# Patient Record
Sex: MALE | Race: WHITE | NOT HISPANIC OR LATINO | Employment: FULL TIME | URBAN - METROPOLITAN AREA
[De-identification: names, ages, dates, MRNs, and addresses within clinical notes are randomized per-mention and may not be internally consistent; named-entity substitution may affect disease eponyms.]

---

## 2018-09-06 ENCOUNTER — HOSPITAL ENCOUNTER (OUTPATIENT)
Dept: RADIOLOGY | Facility: MEDICAL CENTER | Age: 53
End: 2018-09-06

## 2018-09-06 ENCOUNTER — APPOINTMENT (OUTPATIENT)
Dept: RADIOLOGY | Facility: MEDICAL CENTER | Age: 53
DRG: 023 | End: 2018-09-06
Attending: EMERGENCY MEDICINE
Payer: COMMERCIAL

## 2018-09-06 ENCOUNTER — HOSPITAL ENCOUNTER (INPATIENT)
Facility: MEDICAL CENTER | Age: 53
LOS: 8 days | DRG: 023 | End: 2018-09-14
Attending: EMERGENCY MEDICINE | Admitting: HOSPITALIST
Payer: COMMERCIAL

## 2018-09-06 ENCOUNTER — APPOINTMENT (OUTPATIENT)
Dept: RADIOLOGY | Facility: MEDICAL CENTER | Age: 53
DRG: 023 | End: 2018-09-06
Attending: RADIOLOGY
Payer: COMMERCIAL

## 2018-09-06 DIAGNOSIS — I63.9 CEREBROVASCULAR ACCIDENT (CVA), UNSPECIFIED MECHANISM (HCC): ICD-10-CM

## 2018-09-06 DIAGNOSIS — J96.00 ACUTE RESPIRATORY FAILURE, UNSPECIFIED WHETHER WITH HYPOXIA OR HYPERCAPNIA (HCC): ICD-10-CM

## 2018-09-06 PROBLEM — M79.89 SWELLING OF LOWER EXTREMITY: Status: ACTIVE | Noted: 2018-09-06

## 2018-09-06 PROBLEM — J96.01 ACUTE RESPIRATORY FAILURE WITH HYPOXIA (HCC): Status: ACTIVE | Noted: 2018-09-06

## 2018-09-06 PROBLEM — G93.6 CEREBRAL EDEMA (HCC): Status: ACTIVE | Noted: 2018-09-06

## 2018-09-06 LAB
ABO GROUP BLD: NORMAL
ACTION RANGE TRIGGERED IACRT: YES
ALBUMIN SERPL BCP-MCNC: 3.7 G/DL (ref 3.2–4.9)
ALBUMIN/GLOB SERPL: 1.5 G/DL
ALP SERPL-CCNC: 61 U/L (ref 30–99)
ALT SERPL-CCNC: 19 U/L (ref 2–50)
ANION GAP SERPL CALC-SCNC: 7 MMOL/L (ref 0–11.9)
APPEARANCE UR: CLEAR
APTT PPP: 27.6 SEC (ref 24.7–36)
AST SERPL-CCNC: 24 U/L (ref 12–45)
BACTERIA #/AREA URNS HPF: NEGATIVE /HPF
BASE EXCESS BLDA CALC-SCNC: -3 MMOL/L (ref -4–3)
BASOPHILS # BLD AUTO: 0.6 % (ref 0–1.8)
BASOPHILS # BLD: 0.06 K/UL (ref 0–0.12)
BILIRUB SERPL-MCNC: 0.5 MG/DL (ref 0.1–1.5)
BILIRUB UR QL STRIP.AUTO: NEGATIVE
BLD GP AB SCN SERPL QL: NORMAL
BODY TEMPERATURE: ABNORMAL DEGREES
BUN SERPL-MCNC: 19 MG/DL (ref 8–22)
CALCIUM SERPL-MCNC: 8.8 MG/DL (ref 8.5–10.5)
CHLORIDE SERPL-SCNC: 107 MMOL/L (ref 96–112)
CO2 BLDA-SCNC: 25 MMOL/L (ref 20–33)
CO2 SERPL-SCNC: 21 MMOL/L (ref 20–33)
COLOR UR: YELLOW
CREAT SERPL-MCNC: 0.84 MG/DL (ref 0.5–1.4)
EKG IMPRESSION: NORMAL
EOSINOPHIL # BLD AUTO: 0.24 K/UL (ref 0–0.51)
EOSINOPHIL NFR BLD: 2.5 % (ref 0–6.9)
EPI CELLS #/AREA URNS HPF: NEGATIVE /HPF
ERYTHROCYTE [DISTWIDTH] IN BLOOD BY AUTOMATED COUNT: 44.2 FL (ref 35.9–50)
GLOBULIN SER CALC-MCNC: 2.5 G/DL (ref 1.9–3.5)
GLUCOSE BLD-MCNC: 90 MG/DL (ref 65–99)
GLUCOSE SERPL-MCNC: 113 MG/DL (ref 65–99)
GLUCOSE UR STRIP.AUTO-MCNC: NEGATIVE MG/DL
HCO3 BLDA-SCNC: 23.7 MMOL/L (ref 17–25)
HCT VFR BLD AUTO: 44.4 % (ref 42–52)
HGB BLD-MCNC: 15.2 G/DL (ref 14–18)
HYALINE CASTS #/AREA URNS LPF: ABNORMAL /LPF
IMM GRANULOCYTES # BLD AUTO: 0.06 K/UL (ref 0–0.11)
IMM GRANULOCYTES NFR BLD AUTO: 0.6 % (ref 0–0.9)
INR PPP: 1.02 (ref 0.87–1.13)
INST. QUALIFIED PATIENT IIQPT: YES
KETONES UR STRIP.AUTO-MCNC: NEGATIVE MG/DL
LACTATE BLD-SCNC: 1.7 MMOL/L (ref 0.5–2)
LEUKOCYTE ESTERASE UR QL STRIP.AUTO: NEGATIVE
LYMPHOCYTES # BLD AUTO: 0.6 K/UL (ref 1–4.8)
LYMPHOCYTES NFR BLD: 6.2 % (ref 22–41)
MCH RBC QN AUTO: 29.9 PG (ref 27–33)
MCHC RBC AUTO-ENTMCNC: 34.2 G/DL (ref 33.7–35.3)
MCV RBC AUTO: 87.4 FL (ref 81.4–97.8)
MICRO URNS: ABNORMAL
MONOCYTES # BLD AUTO: 0.69 K/UL (ref 0–0.85)
MONOCYTES NFR BLD AUTO: 7.1 % (ref 0–13.4)
NEUTROPHILS # BLD AUTO: 8.09 K/UL (ref 1.82–7.42)
NEUTROPHILS NFR BLD: 83 % (ref 44–72)
NITRITE UR QL STRIP.AUTO: NEGATIVE
NRBC # BLD AUTO: 0 K/UL
NRBC BLD-RTO: 0 /100 WBC
O2/TOTAL GAS SETTING VFR VENT: 100 %
PCO2 BLDA: 48.2 MMHG (ref 26–37)
PCO2 TEMP ADJ BLDA: 45.6 MMHG (ref 26–37)
PH BLDA: 7.3 [PH] (ref 7.4–7.5)
PH TEMP ADJ BLDA: 7.32 [PH] (ref 7.4–7.5)
PH UR STRIP.AUTO: 6 [PH]
PLATELET # BLD AUTO: 287 K/UL (ref 164–446)
PMV BLD AUTO: 9.5 FL (ref 9–12.9)
PO2 BLDA: 217 MMHG (ref 64–87)
PO2 TEMP ADJ BLDA: 211 MMHG (ref 64–87)
POTASSIUM SERPL-SCNC: 4.5 MMOL/L (ref 3.6–5.5)
PROT SERPL-MCNC: 6.2 G/DL (ref 6–8.2)
PROT UR QL STRIP: NEGATIVE MG/DL
PROTHROMBIN TIME: 13.1 SEC (ref 12–14.6)
RBC # BLD AUTO: 5.08 M/UL (ref 4.7–6.1)
RBC # URNS HPF: ABNORMAL /HPF
RBC UR QL AUTO: ABNORMAL
RH BLD: NORMAL
SAO2 % BLDA: 100 % (ref 93–99)
SODIUM SERPL-SCNC: 135 MMOL/L (ref 135–145)
SP GR UR STRIP.AUTO: 1.01
SPECIMEN DRAWN FROM PATIENT: ABNORMAL
TRIGL SERPL-MCNC: 335 MG/DL (ref 0–149)
TROPONIN I SERPL-MCNC: 0.01 NG/ML (ref 0–0.04)
UROBILINOGEN UR STRIP.AUTO-MCNC: 0.2 MG/DL
WBC # BLD AUTO: 9.7 K/UL (ref 4.8–10.8)
WBC #/AREA URNS HPF: ABNORMAL /HPF

## 2018-09-06 PROCEDURE — 700102 HCHG RX REV CODE 250 W/ 637 OVERRIDE(OP): Performed by: INTERNAL MEDICINE

## 2018-09-06 PROCEDURE — 700111 HCHG RX REV CODE 636 W/ 250 OVERRIDE (IP): Performed by: HOSPITALIST

## 2018-09-06 PROCEDURE — 94760 N-INVAS EAR/PLS OXIMETRY 1: CPT

## 2018-09-06 PROCEDURE — 700111 HCHG RX REV CODE 636 W/ 250 OVERRIDE (IP)

## 2018-09-06 PROCEDURE — 83605 ASSAY OF LACTIC ACID: CPT

## 2018-09-06 PROCEDURE — 82962 GLUCOSE BLOOD TEST: CPT

## 2018-09-06 PROCEDURE — 84478 ASSAY OF TRIGLYCERIDES: CPT

## 2018-09-06 PROCEDURE — 99291 CRITICAL CARE FIRST HOUR: CPT

## 2018-09-06 PROCEDURE — 85730 THROMBOPLASTIN TIME PARTIAL: CPT

## 2018-09-06 PROCEDURE — 86850 RBC ANTIBODY SCREEN: CPT

## 2018-09-06 PROCEDURE — 700117 HCHG RX CONTRAST REV CODE 255: Performed by: EMERGENCY MEDICINE

## 2018-09-06 PROCEDURE — 85025 COMPLETE CBC W/AUTO DIFF WBC: CPT

## 2018-09-06 PROCEDURE — 86901 BLOOD TYPING SEROLOGIC RH(D): CPT

## 2018-09-06 PROCEDURE — 70450 CT HEAD/BRAIN W/O DYE: CPT

## 2018-09-06 PROCEDURE — 70498 CT ANGIOGRAPHY NECK: CPT

## 2018-09-06 PROCEDURE — B31R1ZZ FLUOROSCOPY OF INTRACRANIAL ARTERIES USING LOW OSMOLAR CONTRAST: ICD-10-PCS | Performed by: RADIOLOGY

## 2018-09-06 PROCEDURE — 84484 ASSAY OF TROPONIN QUANT: CPT

## 2018-09-06 PROCEDURE — 96366 THER/PROPH/DIAG IV INF ADDON: CPT

## 2018-09-06 PROCEDURE — 0042T CT-CEREBRAL PERFUSION ANALYSIS: CPT

## 2018-09-06 PROCEDURE — 03CG3ZZ EXTIRPATION OF MATTER FROM INTRACRANIAL ARTERY, PERCUTANEOUS APPROACH: ICD-10-PCS | Performed by: RADIOLOGY

## 2018-09-06 PROCEDURE — 93970 EXTREMITY STUDY: CPT

## 2018-09-06 PROCEDURE — 86900 BLOOD TYPING SEROLOGIC ABO: CPT

## 2018-09-06 PROCEDURE — 80053 COMPREHEN METABOLIC PANEL: CPT

## 2018-09-06 PROCEDURE — 70496 CT ANGIOGRAPHY HEAD: CPT

## 2018-09-06 PROCEDURE — 85610 PROTHROMBIN TIME: CPT

## 2018-09-06 PROCEDURE — 82803 BLOOD GASES ANY COMBINATION: CPT

## 2018-09-06 PROCEDURE — 700111 HCHG RX REV CODE 636 W/ 250 OVERRIDE (IP): Performed by: INTERNAL MEDICINE

## 2018-09-06 PROCEDURE — 71045 X-RAY EXAM CHEST 1 VIEW: CPT

## 2018-09-06 PROCEDURE — 700105 HCHG RX REV CODE 258: Performed by: EMERGENCY MEDICINE

## 2018-09-06 PROCEDURE — 5A1945Z RESPIRATORY VENTILATION, 24-96 CONSECUTIVE HOURS: ICD-10-PCS | Performed by: INTERNAL MEDICINE

## 2018-09-06 PROCEDURE — 770022 HCHG ROOM/CARE - ICU (200)

## 2018-09-06 PROCEDURE — C1757 CATH, THROMBECTOMY/EMBOLECT: HCPCS

## 2018-09-06 PROCEDURE — 94002 VENT MGMT INPAT INIT DAY: CPT

## 2018-09-06 PROCEDURE — 99223 1ST HOSP IP/OBS HIGH 75: CPT | Performed by: HOSPITALIST

## 2018-09-06 PROCEDURE — 36600 WITHDRAWAL OF ARTERIAL BLOOD: CPT

## 2018-09-06 PROCEDURE — 700111 HCHG RX REV CODE 636 W/ 250 OVERRIDE (IP): Performed by: EMERGENCY MEDICINE

## 2018-09-06 PROCEDURE — 99291 CRITICAL CARE FIRST HOUR: CPT | Performed by: INTERNAL MEDICINE

## 2018-09-06 PROCEDURE — 96365 THER/PROPH/DIAG IV INF INIT: CPT

## 2018-09-06 PROCEDURE — 93005 ELECTROCARDIOGRAM TRACING: CPT | Performed by: EMERGENCY MEDICINE

## 2018-09-06 PROCEDURE — 700117 HCHG RX CONTRAST REV CODE 255: Performed by: RADIOLOGY

## 2018-09-06 PROCEDURE — 700101 HCHG RX REV CODE 250: Performed by: INTERNAL MEDICINE

## 2018-09-06 PROCEDURE — 81001 URINALYSIS AUTO W/SCOPE: CPT

## 2018-09-06 PROCEDURE — 94640 AIRWAY INHALATION TREATMENT: CPT

## 2018-09-06 RX ORDER — BISACODYL 10 MG
10 SUPPOSITORY, RECTAL RECTAL
Status: DISCONTINUED | OUTPATIENT
Start: 2018-09-06 | End: 2018-09-14 | Stop reason: HOSPADM

## 2018-09-06 RX ORDER — PROMETHAZINE HYDROCHLORIDE 25 MG/1
12.5-25 TABLET ORAL EVERY 4 HOURS PRN
Status: DISCONTINUED | OUTPATIENT
Start: 2018-09-06 | End: 2018-09-14 | Stop reason: HOSPADM

## 2018-09-06 RX ORDER — AMOXICILLIN 250 MG
2 CAPSULE ORAL 2 TIMES DAILY
Status: DISCONTINUED | OUTPATIENT
Start: 2018-09-06 | End: 2018-09-06

## 2018-09-06 RX ORDER — BISACODYL 10 MG
10 SUPPOSITORY, RECTAL RECTAL
Status: DISCONTINUED | OUTPATIENT
Start: 2018-09-06 | End: 2018-09-06

## 2018-09-06 RX ORDER — ONDANSETRON 4 MG/1
4 TABLET, ORALLY DISINTEGRATING ORAL EVERY 4 HOURS PRN
Status: DISCONTINUED | OUTPATIENT
Start: 2018-09-06 | End: 2018-09-08

## 2018-09-06 RX ORDER — DEXTROSE MONOHYDRATE 25 G/50ML
25 INJECTION, SOLUTION INTRAVENOUS
Status: DISCONTINUED | OUTPATIENT
Start: 2018-09-06 | End: 2018-09-13

## 2018-09-06 RX ORDER — IPRATROPIUM BROMIDE AND ALBUTEROL SULFATE 2.5; .5 MG/3ML; MG/3ML
3 SOLUTION RESPIRATORY (INHALATION)
Status: DISCONTINUED | OUTPATIENT
Start: 2018-09-06 | End: 2018-09-08 | Stop reason: ALTCHOICE

## 2018-09-06 RX ORDER — LABETALOL HYDROCHLORIDE 5 MG/ML
10 INJECTION, SOLUTION INTRAVENOUS EVERY 4 HOURS PRN
Status: DISCONTINUED | OUTPATIENT
Start: 2018-09-06 | End: 2018-09-14 | Stop reason: HOSPADM

## 2018-09-06 RX ORDER — SODIUM CHLORIDE 9 MG/ML
500 INJECTION, SOLUTION INTRAVENOUS ONCE
Status: ACTIVE | OUTPATIENT
Start: 2018-09-06 | End: 2018-09-07

## 2018-09-06 RX ORDER — FAMOTIDINE 20 MG/1
20 TABLET, FILM COATED ORAL EVERY 12 HOURS
Status: DISCONTINUED | OUTPATIENT
Start: 2018-09-06 | End: 2018-09-09

## 2018-09-06 RX ORDER — SODIUM CHLORIDE 9 MG/ML
INJECTION, SOLUTION INTRAVENOUS CONTINUOUS
Status: DISCONTINUED | OUTPATIENT
Start: 2018-09-06 | End: 2018-09-07

## 2018-09-06 RX ORDER — HYDRALAZINE HYDROCHLORIDE 20 MG/ML
10 INJECTION INTRAMUSCULAR; INTRAVENOUS
Status: DISCONTINUED | OUTPATIENT
Start: 2018-09-06 | End: 2018-09-14 | Stop reason: HOSPADM

## 2018-09-06 RX ORDER — POLYETHYLENE GLYCOL 3350 17 G/17G
1 POWDER, FOR SOLUTION ORAL
Status: DISCONTINUED | OUTPATIENT
Start: 2018-09-06 | End: 2018-09-14 | Stop reason: HOSPADM

## 2018-09-06 RX ORDER — ACETAMINOPHEN 325 MG/1
650 TABLET ORAL EVERY 6 HOURS PRN
Status: DISCONTINUED | OUTPATIENT
Start: 2018-09-06 | End: 2018-09-14 | Stop reason: HOSPADM

## 2018-09-06 RX ORDER — AMOXICILLIN 250 MG
2 CAPSULE ORAL 2 TIMES DAILY
Status: DISCONTINUED | OUTPATIENT
Start: 2018-09-06 | End: 2018-09-14 | Stop reason: HOSPADM

## 2018-09-06 RX ORDER — IPRATROPIUM BROMIDE AND ALBUTEROL SULFATE 2.5; .5 MG/3ML; MG/3ML
3 SOLUTION RESPIRATORY (INHALATION)
Status: DISCONTINUED | OUTPATIENT
Start: 2018-09-06 | End: 2018-09-14 | Stop reason: HOSPADM

## 2018-09-06 RX ORDER — PROMETHAZINE HYDROCHLORIDE 25 MG/1
12.5-25 SUPPOSITORY RECTAL EVERY 4 HOURS PRN
Status: DISCONTINUED | OUTPATIENT
Start: 2018-09-06 | End: 2018-09-14 | Stop reason: HOSPADM

## 2018-09-06 RX ORDER — POLYETHYLENE GLYCOL 3350 17 G/17G
1 POWDER, FOR SOLUTION ORAL
Status: DISCONTINUED | OUTPATIENT
Start: 2018-09-06 | End: 2018-09-06

## 2018-09-06 RX ORDER — ATORVASTATIN CALCIUM 80 MG/1
80 TABLET, FILM COATED ORAL EVERY EVENING
Status: DISCONTINUED | OUTPATIENT
Start: 2018-09-06 | End: 2018-09-14 | Stop reason: HOSPADM

## 2018-09-06 RX ORDER — ONDANSETRON 2 MG/ML
4 INJECTION INTRAMUSCULAR; INTRAVENOUS EVERY 4 HOURS PRN
Status: DISCONTINUED | OUTPATIENT
Start: 2018-09-06 | End: 2018-09-14 | Stop reason: HOSPADM

## 2018-09-06 RX ADMIN — IOHEXOL 70 ML: 300 INJECTION, SOLUTION INTRAVENOUS at 13:42

## 2018-09-06 RX ADMIN — PROPOFOL 50 MCG/KG/MIN: 10 INJECTION, EMULSION INTRAVENOUS at 22:48

## 2018-09-06 RX ADMIN — PROPOFOL 80 MCG/KG/MIN: 10 INJECTION, EMULSION INTRAVENOUS at 13:28

## 2018-09-06 RX ADMIN — PROPOFOL 50 MCG/KG/MIN: 10 INJECTION, EMULSION INTRAVENOUS at 19:44

## 2018-09-06 RX ADMIN — PROPOFOL 75 MCG/KG/MIN: 10 INJECTION, EMULSION INTRAVENOUS at 12:30

## 2018-09-06 RX ADMIN — PROPOFOL 70 MCG/KG/MIN: 10 INJECTION, EMULSION INTRAVENOUS at 09:45

## 2018-09-06 RX ADMIN — SODIUM CHLORIDE: 9 INJECTION, SOLUTION INTRAVENOUS at 14:26

## 2018-09-06 RX ADMIN — IOHEXOL 100 ML: 350 INJECTION, SOLUTION INTRAVENOUS at 11:09

## 2018-09-06 RX ADMIN — IOHEXOL 50 ML: 350 INJECTION, SOLUTION INTRAVENOUS at 11:30

## 2018-09-06 RX ADMIN — IPRATROPIUM BROMIDE AND ALBUTEROL SULFATE 3 ML: .5; 3 SOLUTION RESPIRATORY (INHALATION) at 14:21

## 2018-09-06 RX ADMIN — IPRATROPIUM BROMIDE AND ALBUTEROL SULFATE 3 ML: .5; 3 SOLUTION RESPIRATORY (INHALATION) at 19:19

## 2018-09-06 RX ADMIN — IPRATROPIUM BROMIDE AND ALBUTEROL SULFATE 3 ML: .5; 3 SOLUTION RESPIRATORY (INHALATION) at 22:02

## 2018-09-06 RX ADMIN — SODIUM CHLORIDE: 9 INJECTION, SOLUTION INTRAVENOUS at 19:48

## 2018-09-06 RX ADMIN — FAMOTIDINE 20 MG: 10 INJECTION, SOLUTION INTRAVENOUS at 17:49

## 2018-09-06 RX ADMIN — PROPOFOL 50 MCG/KG/MIN: 10 INJECTION, EMULSION INTRAVENOUS at 15:30

## 2018-09-06 RX ADMIN — PROPOFOL 70 MCG/KG/MIN: 10 INJECTION, EMULSION INTRAVENOUS at 11:15

## 2018-09-06 ASSESSMENT — PAIN SCALES - GENERAL: PAINLEVEL_OUTOF10: 0

## 2018-09-06 NOTE — ED TRIAGE NOTES
Chief Complaint   Patient presents with   • Possible Stroke     Patient transferred from [place], last seen normal at 2200 yesterday. Family heard a thud on floor at 0400 today and found patient on the ground with left facial droop and left sided weakness. Patient was intubated in [place]. ERP and RT at bedside upon patient arrival. Labs drawn and sent. Propofol drip infusing.

## 2018-09-06 NOTE — OR SURGEON
Immediate Post- Operative Note        PostOp Diagnosis: RT M-1 OCCLUSION      Procedure(s): MECHANICAL THROMBECTOMY      Estimated Blood Loss: Less than 5 ml        Complications: None            9/6/2018     1:50 PM     Jose Francisco Mcnamara

## 2018-09-06 NOTE — H&P
Hospital Medicine History & Physical Note    Date of Service  9/6/2018    Primary Care Physician  No primary care provider on file.    Consultants  Critical care  Neurology    Code Status  Full code    Chief Complaint  Loss of consciousness    History of Presenting Illness  53 y.o. male who presented 9/6/2018 with loss of consciousness.    Mr Klein does not regularly seek medical care, he does not have any known medical issues with the exception of back pain and intermittent lower extremity edema.  Family history is significant for blood clots.  He is intubated and sedated, he is unresponsive and unable to provide any history of present illness.  HPI comes from the patient's son Ignacio who is at the bedside.  The patient, his brother, and his son were in a camping trip near [place], [place]. The patient was last seen normal at 2200 last night.  This morning at 3:45 in the morning, his son heard him fall from the other room.  When they got into the room they found that the patient was lying on the floor.  His left side was flaccid, he was moving his right side.  The patient was alert with his eyes open but was completely unable to speak.  Patient was incontinent.     Patient was taken to the emergency room in [facility] where he was evaluated.  ER chart reviewed by myself, per the chart he was opening his eyes to voice and withdrawing the left side.  He required intubation and was transferred to [facility] for stroke care.    Review of Systems  Review of Systems   Unable to perform ROS: Intubated       Past Medical History  Back pain  Intermittent lower extremity edema    Surgical History  None known    Family History  Son believes there is a family history of DVT    Social History  Patient smokes at least a pack of cigarettes per day  Drinks alcohol rarely  Works construction    Allergies  No Known Allergies    Medications  None       Physical Exam  Blood Pressure: 122/71   Temperature:  36.4 °C (97.5 °F)   Pulse: 89   Respiration: (!) 25   Pulse Oximetry: 100 %     Physical Exam   Constitutional: He is oriented to person, place, and time. He appears well-developed and well-nourished. No distress.   HENT:   Head: Normocephalic and atraumatic.   Eyes: Conjunctivae are normal.   Pupils are pinpoint, appear disconjugate   Cardiovascular: Normal rate, regular rhythm and intact distal pulses.    No murmur heard.  2+ Radial Pulses  Brisk Capillary Refill   Pulmonary/Chest:   Equal chest rise and fall  Mechanical breath sounds bilaterally  No overt wheezing   Abdominal: Soft. Bowel sounds are normal. He exhibits no distension. There is no tenderness. There is no rebound.   Musculoskeletal: Normal range of motion. He exhibits edema.   Neurological: He is alert and oriented to person, place, and time. No cranial nerve deficit.   Intubated and sedated  Patient is spontaneously moving his right leg with semi-purposeful movements, he withdraws right side to painful stimuli, he does not respond to verbal command   Skin: Skin is warm and dry. No rash noted. He is not diaphoretic. No erythema.   Skin is warm and well perfused   Psychiatric: He has a normal mood and affect.       Laboratory:  Recent Labs      09/06/18   0935   WBC  9.7   RBC  5.08   HEMOGLOBIN  15.2   HEMATOCRIT  44.4   MCV  87.4   MCH  29.9   MCHC  34.2   RDW  44.2   PLATELETCT  287   MPV  9.5     Recent Labs      09/06/18   0935   SODIUM  135   POTASSIUM  4.5   CHLORIDE  107   CO2  21   GLUCOSE  113*   BUN  19   CREATININE  0.84   CALCIUM  8.8     Recent Labs      09/06/18   0935   ALTSGPT  19   ASTSGOT  24   ALKPHOSPHAT  61   TBILIRUBIN  0.5   GLUCOSE  113*     Recent Labs      09/06/18   0935   APTT  27.6   INR  1.02         Recent Labs      09/06/18   0935   TRIGLYCERIDE  335*     Lab Results   Component Value Date    TROPONINI 0.01 09/06/2018       Urinalysis:    Recent Labs      09/06/18   1025   SPECGRAVITY  1.014   GLUCOSEUR  Negative    KETONES  Negative   NITRITE  Negative   LEUKESTERAS  Negative   WBCURINE  0-2*   RBCURINE  *   BACTERIA  Negative   EPITHELCELL  Negative        Imaging:  CT-CTA HEAD WITH & W/O-POST PROCESS   Final Result      Findings consistent with thrombus within the distal right M1 segment with attenuation of flow within the right sylvian artery branches.   Remainder of the circulation appears patent.   Right cerebral edema. No acute hemorrhage.      CT-CTA NECK WITH & W/O-POST PROCESSING   Final Result      CT angiogram of the neck within normal limits.      CT-HEAD W/O   Final Result      Suspect right cerebral edema with mild right-sided mass effect and 3 mm right to left midline shift.   No acute intracranial hemorrhage.   No hydrocephalus.      CT-CEREBRAL PERFUSION ANALYSIS   Final Result      1.  CT perfusion examination over the limited section of brain reveals 11 mL of brain parenchyma has less than 30% of cerebral blood flow (CBF).      2.  Please note that the cerebral perfusion was performed on the limited brain tissue around the basal ganglia region. Infarct/ischemia outside the CT perfusion sections can be missed in this study.      DX-CHEST-PORTABLE (1 VIEW)   Final Result      Endotracheal tube terminates above the lefty. Enteric tube projects over the stomach.   Mild lung base atelectasis.      OUTSIDE IMAGES-CT HEAD   Final Result      OUTSIDE IMAGES-DX CHEST   Final Result      MR-BRAIN-W/O    (Results Pending)   LE VENOUS DUPLEX    (Results Pending)         Assessment/Plan:  I anticipate this patient will require at least two midnights for appropriate medical management, necessitating inpatient admission.    * Stroke (HCC)- (present on admission)   Assessment & Plan    Patient appears to have a large right-sided stroke  Complicated by cerebral edema  Interventional radiology has been consulted, stat MRI of the brain is pending, patient may benefit from a mechanical thrombotic  Stroke order set  initiated, admitted to the ICU        Cerebral edema (HCC)- (present on admission)   Assessment & Plan    Patient likely has extensive cytotoxic edema related to the stroke  This is causing a small amount of right to left shift  Await further recommendations from neurology        Acute respiratory failure with hypoxia (HCC)- (present on admission)   Assessment & Plan    Patient intubated for decreased level of consciousness  Spoke with Dr. Sharma of critical care who is kindly agreed to consult          Swelling of lower extremity- (present on admission)   Assessment & Plan    Recent travel, family history of blood clots, check lower extremity Dopplers            VTE prophylaxis: lovenox

## 2018-09-06 NOTE — PROGRESS NOTES
Son, Ignacio, and Brother, Venkat at bedside. Family updated on plan of care and all questions answered.

## 2018-09-06 NOTE — PROGRESS NOTES
IR Procedure RN's Note:    Consent obtained from DPSTEWART son Ignacio Klein; consent signed by MD; emergent case deemed by IR MD; H&P pending from ERP    Cerebral angiogram with possible  done by Dr. Mcnamara; RIGHT femoral artery access site; pt assessed upon arrival, pt A/Ox0, pt aware of the procedure, pt baseline vented and propofol gtt; Penumbra system used as thrombectomy intervention performed; pt appears comfortable throughout the procedure with no signs of distress or discomfort; closure device - TERUMO AngioSeal STS Plus REF#[id number] LOT#[id number] site CDI, soft with no signs of hematoma or bleeding, gauze and tegaderm for dressing; telephone report given to Junie; pt is very sedated, kept vented and on propofol gtt; pt transported to [facility].

## 2018-09-06 NOTE — ED PROVIDER NOTES
ED Provider Note    CHIEF COMPLAINT  No chief complaint on file.      HPI  Filiberto Klein is a 53 y.o. male who presents as a transfer from [place] for evaluation of a stroke.  He was last seen normal at 10 PM last night.  This morning the family heard a thump and found the patient on the floor.  Medics were called.  He was noted to have a left facial droop and left-sided weakness and was taken to the hospital.  Per the flight crew he was never verbal.  He required intubation.  CT scan shows no evidence of extra-axial blood.  He is received no thrombolytics.  He has no known past medical problems.  His most recent blood sugar was 100 and his EKG shows a sinus rhythm.  No history is obtained from the patient.    REVIEW OF SYSTEMS  See HPI for further details. All other systems negative.    PAST MEDICAL HISTORY  No past medical history on file.    FAMILY HISTORY  No family history on file.    SOCIAL HISTORY  Social History     Social History   • Marital status: N/A     Spouse name: N/A   • Number of children: N/A   • Years of education: N/A     Social History Main Topics   • Smoking status: Not on file   • Smokeless tobacco: Not on file   • Alcohol use Not on file   • Drug use: Unknown   • Sexual activity: Not on file     Other Topics Concern   • Not on file     Social History Narrative   • No narrative on file       SURGICAL HISTORY  No past surgical history on file.    CURRENT MEDICATIONS  Home Medications    **Home medications have not yet been reviewed for this encounter**         ALLERGIES  Allergies not on file    PHYSICAL EXAM  VITAL SIGNS: Reviewed  Constitutional: Well developed, Well nourished, on a ventilator.  HENT: Normocephalic, Atraumatic.  Eyes: Pupils are pinpoint bilaterally.  Cardiovascular: Normal heart rate, Normal rhythm, No murmurs, No rubs, No gallops.   Thorax & Lungs: Clear to auscultation without wheezes, rales, or rhonchi.    Abdomen: Soft and nondistended.   Skin: Warm, Dry.  Neurologic:  Alert & oriented x 3, Normal motor function, Normal sensory function, No focal deficits noted.   Psychiatric: Affect normal, Judgment normal, Mood normal.     EKG  EKG Interpretation    Interpreted by emergency department physician    Rhythm: normal sinus   Rate: normal  Axis: left  Ectopy: none  Conduction: Left anterior fascicular block  ST Segments: no acute change  T Waves: no acute change  Q Waves: none    Clinical Impression: no acute changes        RADIOLOGY/PROCEDURES  CT-CTA HEAD WITH & W/O-POST PROCESS   Final Result      Findings consistent with thrombus within the distal right M1 segment with attenuation of flow within the right sylvian artery branches.   Remainder of the circulation appears patent.   Right cerebral edema. No acute hemorrhage.      CT-CTA NECK WITH & W/O-POST PROCESSING   Final Result      CT angiogram of the neck within normal limits.      CT-HEAD W/O   Final Result      Suspect right cerebral edema with mild right-sided mass effect and 3 mm right to left midline shift.   No acute intracranial hemorrhage.   No hydrocephalus.      CT-CEREBRAL PERFUSION ANALYSIS   Final Result      1.  CT perfusion examination over the limited section of brain reveals 11 mL of brain parenchyma has less than 30% of cerebral blood flow (CBF).      2.  Please note that the cerebral perfusion was performed on the limited brain tissue around the basal ganglia region. Infarct/ischemia outside the CT perfusion sections can be missed in this study.      DX-CHEST-PORTABLE (1 VIEW)   Final Result      Endotracheal tube terminates above the lefty. Enteric tube projects over the stomach.   Mild lung base atelectasis.      OUTSIDE IMAGES-CT HEAD   Final Result      OUTSIDE IMAGES-DX CHEST   Final Result      LE VENOUS DUPLEX    (Results Pending)   ECHOCARDIOGRAM COMP W/O CONT    (Results Pending)   IR-THROMBECTOMY ARTERY SECONDARY    (Results Pending)         COURSE & MEDICAL DECISION MAKING  Pertinent Labs & Imaging  studies reviewed. (See chart for details)  This is a 53-year-old here as a transfer from [place] for evaluation of stroke.  The patient was intubated prior to transfer for airway control.  He is on a propofol drip upon arrival making neurologic exam exceedingly limited.  His EKG shows a sinus rhythm.  His last blood sugar was 100.  Laboratories include a troponin I which is negative.  Urine is positive primarily for blood but no evidence of infection.  Chemistries are normal.  INR is normal.  CBC is normal.  The patient did not receive thrombolytics in [place] because he was outside of the treatment window.  On arrival here I ordered CTAs of the head and neck as well as perfusion studies which have been obtained.  I have had multiple discussions with the radiologist to include Dr. Mcnamara of interventional radiology.  He does appear to have a thrombus in his distal M1 segment.  He appears Ardee have some cerebral edema and Dr. Mcnamara is not sure there would be any benefit from trying to remove the thrombus therefore he has recommended a diffusion-weighted MR to see if this in fact is a completed stroke at this point.  The patient's son has come in and I discussed the situation with him and I have explained to him that it is father's in critical condition and there is a very good chance that he is going to have a poor outcome.  I discussed the case with Dr. Escalante of the hospitalist service and he will be the primary admitting physician.  I have also discussed case with Dr. Monique of neurology who will consult on the case.  He is requested a fluid bolus and maintenance fluid which I have ordered.    FINAL IMPRESSION  1.  Acute ischemic stroke  2.  Acute respiratory failure  3.  Patient required 35 minutes of critical care time         Electronically signed by: Nolan Mims, 9/6/2018 9:42 AM

## 2018-09-06 NOTE — CONSULTS
DATE OF SERVICE:  09/06/2018    PULMONARY CRITICAL CARE CONSULTATION    REQUESTING PHYSICIAN:  Silvino Rodriguez MD    REASON FOR REQUEST:  Ventilator management.    HISTORY OF PRESENT ILLNESS:  All information taken from chart review as well   as sign out and son at bedside as patient currently on full mechanical   ventilatory support.  He is a 53-year-old gentleman that does not seek regular   medical attention with uncertain past medical history that was reportedly in   his normal state of health last seen at 2200 last night.  Early this morning   in and around 4:00 a.m., he patient's family heard a thud.  Upon evaluating,   he was lying on the floor with arms stretched out, not really moving or   talking.  EMS was called.  The patient was taken to [place] where he was   identified of having likely CVA, was intubated for airway protection and   transferred to [facility] for further evaluation.  In discussing with son, patient   has had longstanding intermittent bilateral lower extremity edema times   years, had recently had a back injury approximately 1 week ago, was seen in   the ER and outside facility and has been using a cane to ambulate.  Otherwise,   no known medical history, but again he does not see a primary provider or   seek medical attention times years.  He smokes approximately 1 pack per day   and has done so for greater than 30 years.  Drinks alcohol once or twice per   week.  No further history at the present time currently available.    ALLERGIES:  No known drug allergies.    OUTPATIENT MEDICATIONS:  None reported.    FAMILY HISTORY:  Unable to obtain.    PAST MEDICAL HISTORY:  Reported none.    SOCIAL HISTORY:  Longstanding tobacco use, occasional alcohol.    REVIEW OF SYSTEMS:  Unable to obtain given clinical state.    PHYSICAL EXAMINATION:  VITAL SIGNS:  Temperature 96.1, pulse rate 92, blood pressure 132/86, satting   100% on 50% FiO2.  GENERAL:  Well nourished, appears stated age, on full  mechanical ventilatory   support.  HEENT:  Normocephalic, atraumatic, anicteric.  Pupils approximately 2 mm   bilaterally.  CARDIOVASCULAR:  Rate within normal limits.  Intact pulses.  RESPIRATORY:  Diminished, otherwise no wheezes, rales, or rhonchi.  ABDOMEN:  Soft and nondistended.  Positive bowel sounds.  EXTREMITIES:  Bilateral 1+ pitting edema.  NEUROLOGIC:  Unable to assess given clinical state, does appear to be   spontaneously moving right side greater than left and left lower extremity   greater than left upper.  PSYCHIATRIC:  Unable to assess given clinical state.    RESULTS:  CBC unremarkable, chem panel unremarkable.  Troponin 0.01.    Triglycerides 335.  INR 1.02, PTT of 27.6.  Urinalysis with large occult   blood,  rbc's, otherwise negative.  ABG with pH of 7.29, pCO2 of 48, pO2   of 217, 100% saturation on 100% FIO2.    IMAGIN.  CT head per report indicates a suspicious right cerebral edema with mild   right-sided mass effect and 3 mm right to left midline shift.  2.  CTA of the head with contrast showing findings consistent with thrombus   within the distal right M1 segments with attenuation of flow within the right   sylvian artery branches.  Remainder of circulation appears patent.  Right   cerebral edema.  No acute hemorrhage.  3.  CT neck with contrast showed normal and within normal limits.    ASSESSMENT:  1.  Acute right-sided cerebrovascular accident.  2.  Acute hypoxic respiratory failure secondary to above.  3.  Chronic tobacco use.  4.  Does not seek regular medical attention.    Plan:  Again, this is a 53-year-old gentleman with above-mentioned history as   well as presentation, currently on full mechanical ventilatory support.  At   this time, we will maintain titrate him ventilator based on ABG and pulmonary   mechanics.  Currently on propofol for sedation.  Will be maintained on   respiratory therapy and oxygen therapy protocols, was scheduled on p.r.nJenn Kahn.  Given  his underlying history of tobacco use would be certainly at   risk for COPD, but clinically not in exacerbation at this time.  In respect to   his CVA, patient has had a CTA showing a distal right M1 segment thrombus, is   pending MRI for further delineation of timing to event and being   simultaneously evaluated by neuro IR for potential thrombectomy pending MRI   results.  Meanwhile, we will continue supportive therapy as well as   post-stroke protocols and secondary workup.    Prognosis is extremely guarded.    Total critical care time not including billable procedures 35 minutes.       ____________________________________     MD THIAGO Segovia / BARBARA    DD:  09/06/2018 12:34:25  DT:  09/06/2018 13:03:32    D#:  [id number]  Job#:  [id number]

## 2018-09-06 NOTE — PROGRESS NOTES
Pt arrived to [facility] 137 from IR. Bedside report received. Neuro assessment complete.     Per IR Nurse, thrombectomy end time was 1341.

## 2018-09-06 NOTE — ASSESSMENT & PLAN NOTE
Right sided MCA  Loop recorder placed  Status post right M1 thrombectomy  Continue aspirin and statin  No afib captured, however embolic etiology highly suspected, has PFO, cardiology has recommended full anticoagulation to be started 2 weeks after acute event ( this would be 9/20/18 will need a repeat CT of head prior to initiation)  Acute rehab pending insurance auth

## 2018-09-06 NOTE — RESPIRATORY CARE
Adult Ventilation Update         Events/Summary/Plan: Pt intubated PTA. Placed on vent. ABG obtained. FIO2 titrated to 40%. Awaiting PMA orders.

## 2018-09-06 NOTE — ED NOTES
The Medication Reconciliation process has been PARTIALLY completed by interviewing the patient's son who reports that he may have been taking a pain med or muscle relaxer for his lower back.  Pt is from . [place], unknown pharmacy.    Allergies have been reviewed and updated  Antibiotic use in 30 days - none known    Home Pharmacy:  [email or web address]

## 2018-09-07 ENCOUNTER — APPOINTMENT (OUTPATIENT)
Dept: RADIOLOGY | Facility: MEDICAL CENTER | Age: 53
DRG: 023 | End: 2018-09-07
Attending: INTERNAL MEDICINE
Payer: COMMERCIAL

## 2018-09-07 LAB
ABO GROUP BLD: NORMAL
ACTION RANGE TRIGGERED IACRT: NO
ANION GAP SERPL CALC-SCNC: 6 MMOL/L (ref 0–11.9)
BASE EXCESS BLDA CALC-SCNC: -3 MMOL/L (ref -4–3)
BASOPHILS # BLD AUTO: 0.5 % (ref 0–1.8)
BASOPHILS # BLD: 0.05 K/UL (ref 0–0.12)
BODY TEMPERATURE: ABNORMAL DEGREES
BUN SERPL-MCNC: 12 MG/DL (ref 8–22)
CALCIUM SERPL-MCNC: 8.5 MG/DL (ref 8.5–10.5)
CHLORIDE SERPL-SCNC: 112 MMOL/L (ref 96–112)
CHOLEST SERPL-MCNC: 132 MG/DL (ref 100–199)
CO2 BLDA-SCNC: 23 MMOL/L (ref 20–33)
CO2 SERPL-SCNC: 20 MMOL/L (ref 20–33)
CREAT SERPL-MCNC: 0.75 MG/DL (ref 0.5–1.4)
EOSINOPHIL # BLD AUTO: 0.14 K/UL (ref 0–0.51)
EOSINOPHIL NFR BLD: 1.4 % (ref 0–6.9)
ERYTHROCYTE [DISTWIDTH] IN BLOOD BY AUTOMATED COUNT: 44.6 FL (ref 35.9–50)
GLUCOSE BLD-MCNC: 94 MG/DL (ref 65–99)
GLUCOSE BLD-MCNC: 97 MG/DL (ref 65–99)
GLUCOSE BLD-MCNC: 97 MG/DL (ref 65–99)
GLUCOSE BLD-MCNC: 99 MG/DL (ref 65–99)
GLUCOSE SERPL-MCNC: 105 MG/DL (ref 65–99)
HCO3 BLDA-SCNC: 21.7 MMOL/L (ref 17–25)
HCT VFR BLD AUTO: 39.6 % (ref 42–52)
HDLC SERPL-MCNC: 27 MG/DL
HGB BLD-MCNC: 13.1 G/DL (ref 14–18)
IMM GRANULOCYTES # BLD AUTO: 0.05 K/UL (ref 0–0.11)
IMM GRANULOCYTES NFR BLD AUTO: 0.5 % (ref 0–0.9)
INST. QUALIFIED PATIENT IIQPT: YES
LDLC SERPL CALC-MCNC: 62 MG/DL
LV EJECT FRACT  99904: 45
LV EJECT FRACT MOD 4C 99902: 43.1
LYMPHOCYTES # BLD AUTO: 0.43 K/UL (ref 1–4.8)
LYMPHOCYTES NFR BLD: 4.3 % (ref 22–41)
MAGNESIUM SERPL-MCNC: 1.9 MG/DL (ref 1.5–2.5)
MCH RBC QN AUTO: 28.7 PG (ref 27–33)
MCHC RBC AUTO-ENTMCNC: 33.1 G/DL (ref 33.7–35.3)
MCV RBC AUTO: 86.8 FL (ref 81.4–97.8)
MONOCYTES # BLD AUTO: 0.72 K/UL (ref 0–0.85)
MONOCYTES NFR BLD AUTO: 7.3 % (ref 0–13.4)
NEUTROPHILS # BLD AUTO: 8.51 K/UL (ref 1.82–7.42)
NEUTROPHILS NFR BLD: 86 % (ref 44–72)
NRBC # BLD AUTO: 0 K/UL
NRBC BLD-RTO: 0 /100 WBC
O2/TOTAL GAS SETTING VFR VENT: 40 %
PCO2 BLDA: 35.3 MMHG (ref 26–37)
PH BLDA: 7.4 [PH] (ref 7.4–7.5)
PHOSPHATE SERPL-MCNC: 3 MG/DL (ref 2.5–4.5)
PLATELET # BLD AUTO: 249 K/UL (ref 164–446)
PMV BLD AUTO: 9.8 FL (ref 9–12.9)
PO2 BLDA: 106 MMHG (ref 64–87)
POTASSIUM SERPL-SCNC: 3.8 MMOL/L (ref 3.6–5.5)
RBC # BLD AUTO: 4.56 M/UL (ref 4.7–6.1)
RH BLD: NORMAL
SAO2 % BLDA: 98 % (ref 93–99)
SODIUM SERPL-SCNC: 138 MMOL/L (ref 135–145)
SPECIMEN DRAWN FROM PATIENT: ABNORMAL
TRIGL SERPL-MCNC: 213 MG/DL (ref 0–149)
WBC # BLD AUTO: 9.9 K/UL (ref 4.8–10.8)

## 2018-09-07 PROCEDURE — 80048 BASIC METABOLIC PNL TOTAL CA: CPT

## 2018-09-07 PROCEDURE — 700111 HCHG RX REV CODE 636 W/ 250 OVERRIDE (IP): Performed by: INTERNAL MEDICINE

## 2018-09-07 PROCEDURE — 83735 ASSAY OF MAGNESIUM: CPT

## 2018-09-07 PROCEDURE — 71045 X-RAY EXAM CHEST 1 VIEW: CPT

## 2018-09-07 PROCEDURE — 93306 TTE W/DOPPLER COMPLETE: CPT

## 2018-09-07 PROCEDURE — A9270 NON-COVERED ITEM OR SERVICE: HCPCS | Performed by: HOSPITALIST

## 2018-09-07 PROCEDURE — 99233 SBSQ HOSP IP/OBS HIGH 50: CPT | Performed by: HOSPITALIST

## 2018-09-07 PROCEDURE — 700105 HCHG RX REV CODE 258: Performed by: EMERGENCY MEDICINE

## 2018-09-07 PROCEDURE — A9270 NON-COVERED ITEM OR SERVICE: HCPCS | Performed by: INTERNAL MEDICINE

## 2018-09-07 PROCEDURE — 700101 HCHG RX REV CODE 250: Performed by: INTERNAL MEDICINE

## 2018-09-07 PROCEDURE — 700102 HCHG RX REV CODE 250 W/ 637 OVERRIDE(OP): Performed by: INTERNAL MEDICINE

## 2018-09-07 PROCEDURE — 700102 HCHG RX REV CODE 250 W/ 637 OVERRIDE(OP): Performed by: HOSPITALIST

## 2018-09-07 PROCEDURE — 82962 GLUCOSE BLOOD TEST: CPT

## 2018-09-07 PROCEDURE — 700111 HCHG RX REV CODE 636 W/ 250 OVERRIDE (IP): Performed by: HOSPITALIST

## 2018-09-07 PROCEDURE — 85025 COMPLETE CBC W/AUTO DIFF WBC: CPT

## 2018-09-07 PROCEDURE — 94640 AIRWAY INHALATION TREATMENT: CPT

## 2018-09-07 PROCEDURE — 80061 LIPID PANEL: CPT

## 2018-09-07 PROCEDURE — 302136 NUTRITION PUMP: Performed by: INTERNAL MEDICINE

## 2018-09-07 PROCEDURE — 36600 WITHDRAWAL OF ARTERIAL BLOOD: CPT

## 2018-09-07 PROCEDURE — 84100 ASSAY OF PHOSPHORUS: CPT

## 2018-09-07 PROCEDURE — 93306 TTE W/DOPPLER COMPLETE: CPT | Mod: 26 | Performed by: INTERNAL MEDICINE

## 2018-09-07 PROCEDURE — 99291 CRITICAL CARE FIRST HOUR: CPT | Performed by: INTERNAL MEDICINE

## 2018-09-07 PROCEDURE — 770022 HCHG ROOM/CARE - ICU (200)

## 2018-09-07 PROCEDURE — 82803 BLOOD GASES ANY COMBINATION: CPT

## 2018-09-07 PROCEDURE — 94003 VENT MGMT INPAT SUBQ DAY: CPT

## 2018-09-07 PROCEDURE — 700101 HCHG RX REV CODE 250: Performed by: SURGERY

## 2018-09-07 RX ORDER — OXYCODONE HYDROCHLORIDE 10 MG/1
10 TABLET ORAL EVERY 4 HOURS PRN
Status: DISCONTINUED | OUTPATIENT
Start: 2018-09-07 | End: 2018-09-08

## 2018-09-07 RX ORDER — LIDOCAINE HYDROCHLORIDE 20 MG/ML
2 INJECTION, SOLUTION INFILTRATION; PERINEURAL
Status: DISCONTINUED | OUTPATIENT
Start: 2018-09-07 | End: 2018-09-07

## 2018-09-07 RX ORDER — ASPIRIN 325 MG
325 TABLET ORAL DAILY
Status: DISCONTINUED | OUTPATIENT
Start: 2018-09-07 | End: 2018-09-08

## 2018-09-07 RX ORDER — LIDOCAINE HYDROCHLORIDE 20 MG/ML
2 INJECTION, SOLUTION INFILTRATION; PERINEURAL
Status: DISCONTINUED | OUTPATIENT
Start: 2018-09-07 | End: 2018-09-14 | Stop reason: HOSPADM

## 2018-09-07 RX ORDER — MAGNESIUM SULFATE HEPTAHYDRATE 40 MG/ML
2 INJECTION, SOLUTION INTRAVENOUS ONCE
Status: COMPLETED | OUTPATIENT
Start: 2018-09-07 | End: 2018-09-07

## 2018-09-07 RX ORDER — DEXMEDETOMIDINE HYDROCHLORIDE 4 UG/ML
.1-1.5 INJECTION, SOLUTION INTRAVENOUS CONTINUOUS
Status: DISCONTINUED | OUTPATIENT
Start: 2018-09-07 | End: 2018-09-09

## 2018-09-07 RX ORDER — OXYCODONE HYDROCHLORIDE 5 MG/1
5 TABLET ORAL EVERY 4 HOURS PRN
Status: DISCONTINUED | OUTPATIENT
Start: 2018-09-07 | End: 2018-09-08

## 2018-09-07 RX ORDER — SODIUM CHLORIDE 9 MG/ML
INJECTION, SOLUTION INTRAVENOUS CONTINUOUS
Status: DISCONTINUED | OUTPATIENT
Start: 2018-09-07 | End: 2018-09-08

## 2018-09-07 RX ADMIN — PROPOFOL 50 MCG/KG/MIN: 10 INJECTION, EMULSION INTRAVENOUS at 04:18

## 2018-09-07 RX ADMIN — DEXMEDETOMIDINE HYDROCHLORIDE 0.2 MCG/KG/HR: 100 INJECTION, SOLUTION INTRAVENOUS at 12:23

## 2018-09-07 RX ADMIN — DEXMEDETOMIDINE HYDROCHLORIDE 0.5 MCG/KG/HR: 100 INJECTION, SOLUTION INTRAVENOUS at 18:02

## 2018-09-07 RX ADMIN — DEXMEDETOMIDINE HYDROCHLORIDE 0.5 MCG/KG/HR: 100 INJECTION, SOLUTION INTRAVENOUS at 22:06

## 2018-09-07 RX ADMIN — PROPOFOL 50 MCG/KG/MIN: 10 INJECTION, EMULSION INTRAVENOUS at 06:50

## 2018-09-07 RX ADMIN — IPRATROPIUM BROMIDE AND ALBUTEROL SULFATE 3 ML: .5; 3 SOLUTION RESPIRATORY (INHALATION) at 06:18

## 2018-09-07 RX ADMIN — PROPOFOL 60 MCG/KG/MIN: 10 INJECTION, EMULSION INTRAVENOUS at 01:56

## 2018-09-07 RX ADMIN — LIDOCAINE HYDROCHLORIDE 2 ML: 20 INJECTION, SOLUTION INFILTRATION; PERINEURAL at 20:29

## 2018-09-07 RX ADMIN — SENNOSIDES AND DOCUSATE SODIUM 2 TABLET: 8.6; 5 TABLET ORAL at 05:44

## 2018-09-07 RX ADMIN — ACETAMINOPHEN 650 MG: 325 TABLET, FILM COATED ORAL at 18:50

## 2018-09-07 RX ADMIN — ENOXAPARIN SODIUM 40 MG: 100 INJECTION SUBCUTANEOUS at 05:43

## 2018-09-07 RX ADMIN — IPRATROPIUM BROMIDE AND ALBUTEROL SULFATE 3 ML: .5; 3 SOLUTION RESPIRATORY (INHALATION) at 15:07

## 2018-09-07 RX ADMIN — IPRATROPIUM BROMIDE AND ALBUTEROL SULFATE 3 ML: .5; 3 SOLUTION RESPIRATORY (INHALATION) at 02:37

## 2018-09-07 RX ADMIN — SODIUM CHLORIDE: 9 INJECTION, SOLUTION INTRAVENOUS at 01:57

## 2018-09-07 RX ADMIN — IPRATROPIUM BROMIDE AND ALBUTEROL SULFATE 3 ML: .5; 3 SOLUTION RESPIRATORY (INHALATION) at 18:54

## 2018-09-07 RX ADMIN — FAMOTIDINE 20 MG: 20 TABLET ORAL at 17:16

## 2018-09-07 RX ADMIN — IPRATROPIUM BROMIDE AND ALBUTEROL SULFATE 3 ML: .5; 3 SOLUTION RESPIRATORY (INHALATION) at 10:20

## 2018-09-07 RX ADMIN — FAMOTIDINE 20 MG: 10 INJECTION, SOLUTION INTRAVENOUS at 05:43

## 2018-09-07 RX ADMIN — PROPOFOL 50 MCG/KG/MIN: 10 INJECTION, EMULSION INTRAVENOUS at 09:35

## 2018-09-07 RX ADMIN — POTASSIUM BICARBONATE 25 MEQ: 25 TABLET, EFFERVESCENT ORAL at 12:18

## 2018-09-07 RX ADMIN — SENNOSIDES AND DOCUSATE SODIUM 2 TABLET: 8.6; 5 TABLET ORAL at 17:16

## 2018-09-07 RX ADMIN — MAGNESIUM SULFATE IN WATER 2 G: 40 INJECTION, SOLUTION INTRAVENOUS at 09:24

## 2018-09-07 RX ADMIN — OXYCODONE HYDROCHLORIDE 5 MG: 5 TABLET ORAL at 18:52

## 2018-09-07 RX ADMIN — IPRATROPIUM BROMIDE AND ALBUTEROL SULFATE 3 ML: .5; 3 SOLUTION RESPIRATORY (INHALATION) at 22:02

## 2018-09-07 RX ADMIN — FENTANYL CITRATE 50 MCG: 50 INJECTION INTRAMUSCULAR; INTRAVENOUS at 10:16

## 2018-09-07 RX ADMIN — ASPIRIN 325 MG: 325 TABLET, COATED ORAL at 15:05

## 2018-09-07 RX ADMIN — ATORVASTATIN CALCIUM 80 MG: 80 TABLET, FILM COATED ORAL at 17:16

## 2018-09-07 NOTE — DIETARY
"Nutrition Support Assessment     Nutrition services:   Day 1 of admit.  Filiberto Klein is a 53 y.o. male with admitting DX of Loss of Consciousness     Current problem list:  1. Stroke  2. Cerebral Edema  3. ARF with Hypoxia     Assessment:  Estimated Nutritional Needs based on:   Height: 180.3 cm (5' 11\"), Weight: 121.3 kg (267 lb)  Weight to Use in Calculations: 121.3 kg (267 lb)  Ideal Body Weight: 78.2 kg (%IBW = 155%)  Body mass index is 37.31 kg/m². (Obese Class II)    Calculation/Equation:    -REE per MSJ = 2090   -RMR per [id number] = 2340 (Ve = 10.8, Tmax = 37.2)   -65-70% RMR = 1520 - 1640 (70% REE = 1460)  Total Calories / day: 1330 - 1700  (Calories / k - 14)  Total Grams Protein / day: 117 - 156  (Grams Protein / kg IBW: 1.5 - 2.0) (0.8 gm/kg of actual body weight = 97 gm)     Evaluation:   1. Pt intubated, on the vent, and in need of nutrition support  2. Pt to have a cortrak placed for Enteral access  3. Glucose: 105. T  4. Pt with OG to LCS, to be removed, per MD in rounds  5. Pertinent Meds: Mag Sulfate, Klyte. Fluids; NS @ 150 mL/hr  6. Propofol @ 34.2 mL/hr, providing 902 kcals from lipids per day- Will adjust TF accordingly  7. Will hypocalorically feed pt per SCCM guidelines for pt's with BMI >30 on the vent to prevent overfeeding.   8. While pt is on propofol, will feed closer to MSJ in order to meet protein needs     Recommendations/Plan:  1. Once Cortrak has been placed and placement verified, initiate nutrition support.  2. On Propofol: Start Peptamen Intense @ 25 mL/hr and advance, per protocol, to goal rate of 45 mL/hr. Goal rate to provide 1080 kcals (plus kcals from propofol), 99 gm protein, and 907 mL free water per day  3. Off Propofol: Provide Peptamen Intense VHP @ final goal rate of 60 mL/hr, to provide 1440 kcals, 132 gm protein, and 1210 mL free water per day  4. Fluids per MD    RD following               "

## 2018-09-07 NOTE — CARE PLAN
Problem: Acute Care of the Stroke Patient  Goal: Optimal Outcome for the Stroke patient  Outcome: PROGRESSING AS EXPECTED  Vital signs and neuro checks completed per protocol. NIHSS completed and documented Qshift for 72 hours. R fem groin site assessed with vital signs and neuro checks.     Problem: Mechanical Ventilation  Goal: Safe management of artificial airway and ventilation  Outcome: PROGRESSING AS EXPECTED  q2 oral care provided, SCD's in place for DVT prophylaxis, peptic ulcer prophylaxis in place, HOB elevated to 30*, suctioning PRN completed, pt and family educated on need for intubation, all questions answered.

## 2018-09-07 NOTE — PROGRESS NOTES
On 40Pulmonary Critical Care Progress Note      DOA: 9/6/2018    Chief Complaint: cva     History of Present Illness: 53-year-old gentleman that does not seek regular   medical attention with uncertain past medical history that was reportedly in   his normal state of health last seen at 2200 last night.  Early this morning   in and around 4:00 a.m., he patient's family heard a thud.  Upon evaluating,   he was lying on the floor with arms stretched out, not really moving or   talking.  EMS was called.  The patient was taken to [place] where he was   identified of having likely CVA, was intubated for airway protection and   transferred to [facility] for further evaluation.  In discussing with son, patient   has had longstanding intermittent bilateral lower extremity edema times   years, had recently had a back injury approximately 1 week ago, was seen in   the ER and outside facility and has been using a cane to ambulate.  Otherwise,   no known medical history, but again he does not see a primary provider or   seek medical attention times years.  He smokes approximately 1 pack per day   and has done so for greater than 30 years.  Drinks alcohol once or twice per   week.  No further history at the present time currently available.      Interval Events:  24 hour interval history reviewed   Tm 99.3  -2.1L over last 24hr  S/p thrombectomy 9/6  Wbc 10  K 3.8  Mg 1.9  Abg 7.39/35/106/98 on 40%    Propofol 30    Review of Systems   Unable to perform ROS: Acuity of condition     Physical Exam   Constitutional: He appears well-developed and well-nourished.   HENT:   Head: Normocephalic and atraumatic.   Eyes: Pupils are equal, round, and reactive to light. Conjunctivae are normal.   Neck: No tracheal deviation present.   Cardiovascular: Normal rate and intact distal pulses.    Pulmonary/Chest: He has no wheezes. He has no rales.   Abdominal: Soft. He exhibits no distension. There is no tenderness.   Musculoskeletal: He exhibits  edema.   Neurological:   Unable to assess given sedation   Skin: Skin is warm and dry.   Psychiatric:   sedate   Nursing note and vitals reviewed.      PFSH:  No change.    Respiratory:  Martinez Vent Mode: APVCMV, Rate (breaths/min): 24, Vt Target (mL): 450, PEEP/CPAP: 8, FiO2: 30, Static Compliance (ml / cm H2O): 75.4, Control VTE (exp VT): 450  Pulse Oximetry: 98 %  Chest Tube Drains:            Recent Labs      09/06/18   1013  09/07/18   0435   ISTATAPH  7.299*  7.398*   ISTATAPCO2  48.2*  35.3   ISTATAPO2  217*  106*   ISTATATCO2  25  23   GBYPODI9PPL  100*  98   ISTATARTHCO3  23.7  21.7   ISTATARTBE  -3  -3   ISTATTEMP  35.7 C  99.0 F   ISTATFIO2  100  40   ISTATSPEC  Arterial  Arterial   ISTATAPHTC  7.318*   --    [id number]  211*   --        HemoDynamics:  Pulse: 83, Heart Rate (Monitored): 83  Blood Pressure: 105/78, NIBP: 106/69         Recent Labs      09/06/18   0935   TROPONINI  0.01       Neuro:  GCS Total [place] Coma Score: 8         Fluids:  Intake/Output       09/05/18 0700 - 09/06/18 0659 (Not Admitted) 09/06/18 0700 - 09/07/18 0659 09/07/18 0700 - 09/08/18 0659      7847-0902 4389-1073 Total 7226-6346 6431-4150 Total 7754-3084 9133-1864 Total       Intake    I.V.  --  -- --  341.7  431.7 773.4  258.4  -- 258.4    Magnesium Sulfate Volume -- -- -- -- -- -- 65 -- 65    Propofol Volume -- -- -- 341.7 431.7 773.4 193.4 -- 193.4    Total Intake -- -- -- 341.7 431.7 773.4 258.4 -- 258.4       Output    Urine  --  -- --  1450  1455 2905  600  -- 600    Output (mL) (Urinary Catheter Indwelling Catheter 16 fr) -- -- -- -- -- -- 250 -- 250    Output (mL) ([REMOVED] Urinary Catheter Indwelling Catheter) -- -- -- 1450 1455 2905 350 -- 350    Stool  --  -- --  --  -- --  --  -- --    Number of Times Stooled -- -- -- 0 x 0 x 0 x 0 x -- 0 x    Emesis/NG output  --  -- --  0  -- 0  200  -- 200    Output (mL) ([REMOVED] Enteral Tube Oral Oral Gastric) -- -- -- 0 -- 0 200 -- 200    Total Output -- -- -- 1450  1455 2905 800 -- 800       Net I/O     -- -- -- -1108.3 -1023.3 -2131.6 -541.6 -- -541.6        Weight: 121.3 kg (267 lb 6.7 oz)  Recent Labs      1835  18   0534   SODIUM  135  138   POTASSIUM  4.5  3.8   CHLORIDE  107  112   CO2  21  20   BUN  19  12   CREATININE  0.84  0.75   MAGNESIUM   --   1.9   PHOSPHORUS   --   3.0   CALCIUM  8.8  8.5       GI/Nutrition:    Liver Function  Recent Labs      1835  18   0534   ALTSGPT  19   --    ASTSGOT  24   --    ALKPHOSPHAT  61   --    TBILIRUBIN  0.5   --    GLUCOSE  113*  105*       Heme:  Recent Labs      1835  18   0534   RBC  5.08  4.56*   HEMOGLOBIN  15.2  13.1*   HEMATOCRIT  44.4  39.6*   PLATELETCT  287  249   PROTHROMBTM  13.1   --    APTT  27.6   --    INR  1.02   --        Infectious Disease:  Monitored Temp 2  Av.6 °C (97.9 °F)  Min: 35.7 °C (96.3 °F)  Max: 37.4 °C (99.3 °F)  Temp  Av.8 °C (96.4 °F)  Min: 35.8 °C (96.4 °F)  Max: 35.8 °C (96.4 °F)  Micro: cultures reviewed  Recent Labs      18   0534   WBC  9.7  9.9   NEUTSPOLYS  83.00*  86.00*   LYMPHOCYTES  6.20*  4.30*   MONOCYTES  7.10  7.30   EOSINOPHILS  2.50  1.40   BASOPHILS  0.60  0.50   ASTSGOT  24   --    ALTSGPT  19   --    ALKPHOSPHAT  61   --    TBILIRUBIN  0.5   --      Current Facility-Administered Medications   Medication Dose Frequency Provider Last Rate Last Dose   • aspirin (ASA) tablet 325 mg  325 mg DAILY Brayden Cramer M.D.       • dexmedetomidine (PRECEDEX) 400 mcg/100mL NS premix infusion  0.1-1.5 mcg/kg/hr Continuous Chemo Sharma M.D. 12.1 mL/hr at 09/07/18 1234 0.4 mcg/kg/hr at 18 1234   • oxyCODONE immediate-release (ROXICODONE) tablet 5 mg  5 mg Q4HRS PRDEBORAH Sharma M.D.        Or   • oxyCODONE immediate release (ROXICODONE) tablet 10 mg  10 mg Q4HRS PRDEBORAH Sharma M.D.       • labetalol (NORMODYNE,TRANDATE) injection 10 mg  10 mg Q4HRS PRDEBORAH Rodriguez M.D.        Or   •  hydrALAZINE (APRESOLINE) injection 10 mg  10 mg Q2HRS PRN Silvino Rodriguez M.D.       • atorvastatin (LIPITOR) tablet 80 mg  80 mg Q EVENING Silvino Rodriguez M.D.   Stopped at 09/06/18 1800   • senna-docusate (PERICOLACE or SENOKOT S) 8.6-50 MG per tablet 2 Tab  2 Tab BID Silvino Rodriguez M.D.   2 Tab at 09/07/18 0544    And   • polyethylene glycol/lytes (MIRALAX) PACKET 1 Packet  1 Packet QDAY PRN Silvino Rodriguez M.D.        And   • magnesium hydroxide (MILK OF MAGNESIA) suspension 30 mL  30 mL QDAY PRN Silvino Rodriguez M.D.        And   • bisacodyl (DULCOLAX) suppository 10 mg  10 mg QDAY PRN Silvino Rodriguez M.D.       • ondansetron (ZOFRAN) syringe/vial injection 4 mg  4 mg Q4HRS PRN Silvino Rodriguez M.D.       • ondansetron (ZOFRAN ODT) dispertab 4 mg  4 mg Q4HRS PRN Silvino Rodriguez M.D.       • promethazine (PHENERGAN) tablet 12.5-25 mg  12.5-25 mg Q4HRS PRN Silvino Rodriguez M.D.       • promethazine (PHENERGAN) suppository 12.5-25 mg  12.5-25 mg Q4HRS PRN Silvino Rodriguez M.D.       • prochlorperazine (COMPAZINE) injection 5-10 mg  5-10 mg Q4HRS PRN Silvino Rodriguez M.D.       • acetaminophen (TYLENOL) tablet 650 mg  650 mg Q6HRS PRN Silvino Rodriguez M.D.       • enoxaparin (LOVENOX) inj 40 mg  40 mg DAILY Silvino Rodriguez M.D.   40 mg at 09/07/18 0543   • NS infusion   Continuous Nolan Mims M.D. 150 mL/hr at 09/07/18 0157     • Respiratory Care per Protocol   Continuous RT Chemo Sharma M.D.       • MD Alert...ICU Electrolyte Replacement per Pharmacy   pharmacy to dose Chemo Sharma M.D.       • fentaNYL (SUBLIMAZE) injection 25 mcg  25 mcg Q HOUR PRN Chemo Sharma M.D.        Or   • fentaNYL (SUBLIMAZE) injection 50 mcg  50 mcg Q HOUR PRN Chemo Sharma M.D.   50 mcg at 09/07/18 1016    Or   • fentaNYL (SUBLIMAZE) injection 100 mcg  100 mcg Q HOUR PRN Chemo Sharma M.D.       • ipratropium-albuterol (DUONEB) nebulizer solution  3 mL Q2HRS PRN (RT) Chemo Sharma M.D.       • ipratropium-albuterol (DUONEB) nebulizer  solution  3 mL Q4HRS (RT) Chemo Sharma M.D.   3 mL at 09/07/18 1020   • famotidine (PEPCID) tablet 20 mg  20 mg Q12HRS Chemo Sharma M.D.        Or   • famotidine (PEPCID) injection 20 mg  20 mg Q12HRS Chemo Sharma M.D.   20 mg at 09/07/18 0543   • insulin regular (HUMULIN R) injection 2-9 Units  2-9 Units Q6HRS Chemo Sharma M.D.   Stopped at 09/06/18 1800    And   • glucose 4 g chewable tablet 16 g  16 g Q15 MIN PRN Chemo Sharma M.D.        And   • dextrose 50% (D50W) injection 25 mL  25 mL Q15 MIN PRN Chemo Sharma M.D.         Last reviewed on 9/6/2018 11:47 AM by Santana Fajardo    Quality  Measures:  Labs reviewed, Medications reviewed and Radiology images reviewed                      Assessment/Plan:  Acute right-sided cerebrovascular accident.   - not tpa candidate due to unknown timing   - s/p [facility] thrombectomy 9/6   - serial neuro exams   - transition to precedex from prop   - will need pt/ot/speech +/- rehab once stable   - asa + high intensity statin   - echo pending read    Acute hypoxic respiratory failure secondary to above.   - intubated 9/6   - continue full vent support   - I am adjusting vent based on abg/pulm mechanics   - rt/02 protocols   - transition to precedex form propofol    Chronic tobacco use.   - will  on cessation when stable    Does not seek regular medical attention.         Discussed patient condition and risk of morbidity and/or mortality with Family, RN, RT, Therapies, Pharmacy and hospitalist.    The patient remains critically ill.  Critical care time = 34 minutes in directly providing and coordinating critical care and extensive data review.  No time overlap and excludes procedures.

## 2018-09-07 NOTE — PROGRESS NOTES
[facility] Hospitalist Progress Note    Date of Service: 2018    Chief Complaint  53 y.o. male admitted 2018 with acute stroke underwent right M1 thrombectomy    Interval Problem Update    Intubated, son at bedside  SR 80-90  SBP stable  Copious oral secretion  NG 200ml overnight  Prop 50   VD#2        Consultants/Specialty  Neurology  Critical care    Disposition  ICU        Review of Systems   Unable to perform ROS: Intubated      Physical Exam  Laboratory/Imaging   Hemodynamics  Temp (24hrs), Av °C (96.8 °F), Min:35.8 °C (96.4 °F), Max:36.4 °C (97.5 °F)   Temperature: (!) 35.8 °C (96.4 °F), Monitored Temp: 37 °C (98.6 °F)  Pulse  Av.8  Min: 61  Max: 98 Heart Rate (Monitored): 90  Blood Pressure: 105/78, NIBP: 114/66      Respiratory  Martinez Vent Mode: APVCMV, Rate (breaths/min): 24, PEEP/CPAP: 8, PEEP/CPAP: 8, FiO2: 30, P Peak (PIP): 22, P MEAN: 12   Respiration: (!) 24, Pulse Oximetry: 96 %     Work Of Breathing / Effort: Vented  RUL Breath Sounds: Clear, RML Breath Sounds: Clear;Diminished, RLL Breath Sounds: Diminished, THAIS Breath Sounds: Clear, LLL Breath Sounds: Diminished    Fluids    Intake/Output Summary (Last 24 hours) at 18 0752  Last data filed at 18 0600   Gross per 24 hour   Intake           773.37 ml   Output             2905 ml   Net         -2131.63 ml       Nutrition  Orders Placed This Encounter   Procedures   • Diet NPO     Standing Status:   Standing     Number of Occurrences:   1     Order Specific Question:   Type:     Answer:   Now [1]     Order Specific Question:   Restrict to:     Answer:   Strict [1]     Physical Exam   Constitutional: He appears well-developed and well-nourished.   HENT:   Head: Normocephalic and atraumatic.   Right Ear: External ear normal.   Left Ear: External ear normal.   Mouth/Throat: No oropharyngeal exudate.   Eyes: Conjunctivae are normal. Right eye exhibits no discharge. Left eye exhibits no discharge. No scleral icterus.    Neck: Neck supple. No JVD present. No tracheal deviation present.   Cardiovascular: Normal rate and regular rhythm.  Exam reveals no gallop and no friction rub.    No murmur heard.  Pulmonary/Chest: Effort normal and breath sounds normal. No stridor. No respiratory distress. He has no wheezes. He has no rales. He exhibits no tenderness.   Intubated   Abdominal: Soft. Bowel sounds are normal. He exhibits no distension. There is no tenderness. There is no rebound.   Musculoskeletal: He exhibits edema. He exhibits no tenderness.   Neurological:   Follows command in all extremities right greater than left   Skin: Skin is warm and dry. He is not diaphoretic. No cyanosis. Nails show no clubbing.   Psychiatric:   Sedated   Nursing note and vitals reviewed.      Recent Labs      09/06/18   0935  09/07/18   0534   WBC  9.7  9.9   RBC  5.08  4.56*   HEMOGLOBIN  15.2  13.1*   HEMATOCRIT  44.4  39.6*   MCV  87.4  86.8   MCH  29.9  28.7   MCHC  34.2  33.1*   RDW  44.2  44.6   PLATELETCT  287  249   MPV  9.5  9.8     Recent Labs      09/06/18   0935  09/07/18   0534   SODIUM  135  138   POTASSIUM  4.5  3.8   CHLORIDE  107  112   CO2  21  20   GLUCOSE  113*  105*   BUN  19  12   CREATININE  0.84  0.75   CALCIUM  8.8  8.5     Recent Labs      09/06/18   0935   APTT  27.6   INR  1.02         Recent Labs      09/06/18   0935  09/07/18   0534   TRIGLYCERIDE  335*  213*   HDL   --   27*   LDL   --   62          Assessment/Plan     * Stroke (HCC)- (present on admission)   Assessment & Plan    Patient appears to have a large right-sided stroke    Status post right M1 thrombectomy  Aspirin statin  PT/OT/SLP when able to participate  Follow-up on echocardiogram  Telemetry monitoring        Cerebral edema (HCC)- (present on admission)   Assessment & Plan      Close clinical monitoring with serial neurologic exams  Follow-up on MRI results  Neurology following        Acute respiratory failure with hypoxia (HCC)- (present on admission)    Assessment & Plan    Intubated on 9/6/2018  Vent management per critical care discussed with Dr. Sharma          Swelling of lower extremity- (present on admission)   Assessment & Plan    Lower extremity duplex negative for DVT          Quality-Core Measures   Reviewed items::  Labs reviewed, Medications reviewed and Radiology images reviewed  Wells catheter::  Unconscious / Sedated Patient on a Ventilator  DVT prophylaxis pharmacological::  Enoxaparin (Lovenox)  Ulcer Prophylaxis::  Yes      Plan of care discussed with nursing staff pharmacist and Dr. Sharma.  Discussed with son at bedside patient's condition and test results and plan of care

## 2018-09-07 NOTE — PROGRESS NOTES
Saturated spot noted on pts gown in groin area. Dunn assessed and blood noted around tip of penis. Deflated dunn balloon and only received 8 mls out. Reinflated with a full 10 mls and significant blood return. Dr. Sharma notified.

## 2018-09-07 NOTE — CARE PLAN
Problem: Ventilation Defect:  Goal: Ability to achieve and maintain unassisted ventilation or tolerate decreased levels of ventilator support  Outcome: PROGRESSING AS EXPECTED    Intervention: Support and monitor invasive and noninvasive mechanical ventilation  Pt on full, monitored support  Intervention: Monitor ventilator weaning response  No SBT's yet  Intervention: Perform ventilator associated pneumonia prevention interventions  HOB at or above 30 degrees.  ETT cuff between 20-30 cwp  Intervention: Manage ventilation therapy by monitoring diagnostic test results  Discussed during morning rounds

## 2018-09-07 NOTE — CONSULTS
"Admit Date: 9/6/2018    Resident(s): Julia Rae      HPI:  52 y/o M who doesn't seek medical care regularly and unknown pmh except for obesity and tobacco use (~1 PPD) flown in after he was found down by family. Per son, they are from [facility] and are on a camping trip in [place], [place]. He was last seen well at 22:00 9/5/18, at 3:45 9/6/18 they heard a thump in the trailer and found the pt down on the ground with a L facial droop and was only able to move his R side. He was awake and alert but couldn't speak with them. He was taken to [facility] where he was intubated and transferred to [facility]. CTA head showed R distal M1 thrombus. S/p thrombectomy by IR on 9/6/18 with patent M1, M2, M3 but persistent occlusion of distal small posterior division of M2.    Interval Events:  Currently on spontaneous breathing, weaning off sedation. Able to follow commands.     Review of Systems   Unable to perform ROS: Intubated         PHYSICAL EXAM  Vitals:    09/07/18 1016 09/07/18 1040 09/07/18 1140 09/07/18 1200   BP:       Pulse: 85 84 81 83   Resp: (!) 24 (!) 24 (!) 24 20   Temp:       SpO2: 96% 97% 97% 98%   Weight:       Height:         Body mass index is 37.31 kg/m².  /78   Pulse 83   Temp (!) 35.8 °C (96.4 °F)   Resp 20   Ht 1.803 m (5' 10.98\")   Wt 121.3 kg (267 lb 6.7 oz)   SpO2 98%   BMI 37.31 kg/m²   O2 therapy: Pulse Oximetry: 98 %, O2 Delivery: Ventilator    Physical Exam   Constitutional:   Intubated and sedated   HENT:   Head: Normocephalic and atraumatic.   Eyes: Pupils are equal, round, and reactive to light. Conjunctivae are normal.   Neck: Neck supple.   Cardiovascular: Normal rate, regular rhythm and normal heart sounds.    Musculoskeletal: He exhibits edema.   In the lower extremities bilaterally   Neurological: He is alert.   Intubated and sedated, unable to assess. Able to follow some commands, appears to be moving extremities R > L         Meds:  • aspirin  325 mg   "   • dexmedetomidine  0.1-1.5 mcg/kg/hr 1 mcg/kg/hr (09/07/18 9562)   • oxyCODONE immediate-release  5 mg      Or   • oxyCODONE immediate-release  10 mg     • NS       • labetalol  10 mg      Or   • hydrALAZINE  10 mg     • atorvastatin  80 mg     • senna-docusate  2 Tab      And   • polyethylene glycol/lytes  1 Packet      And   • magnesium hydroxide  30 mL      And   • bisacodyl  10 mg     • ondansetron  4 mg     • ondansetron  4 mg     • promethazine  12.5-25 mg     • promethazine  12.5-25 mg     • prochlorperazine  5-10 mg     • acetaminophen  650 mg     • enoxaparin (LOVENOX) injection  40 mg     • Respiratory Care per Protocol       • MD Alert...Adult ICU Electrolyte Replacement per Pharmacy       • fentaNYL  25 mcg      Or   • fentaNYL  50 mcg      Or   • fentaNYL  100 mcg     • ipratropium-albuterol  3 mL     • ipratropium-albuterol  3 mL     • famotidine  20 mg      Or   • famotidine  20 mg     • insulin regular  2-9 Units      And   • glucose 4 g  16 g      And   • dextrose 50%  25 mL          Imaging:  ECHOCARDIOGRAM-COMP W/ CONT         DX-ABDOMEN FOR TUBE PLACEMENT   Final Result      Enteric tube projects over the distal stomach/proximal duodenum.         DX-CHEST-PORTABLE (1 VIEW)   Final Result         1.  Mild pulmonary edema and/or infiltrates.      LE VENOUS DUPLEX   Final Result      IR-THROMBECTOMY ARTERY SECONDARY   Final Result      1. Subtotal occlusion of the right M1 segment.      2.  Successful cerebral thrombectomy performed with post intervention   angiogram demonstrating  patent patent right M1 segment and M2 and M3 branches, with a persistent occlusion of a distal small posterior division M2 branch            CT-CTA HEAD WITH & W/O-POST PROCESS   Final Result      Findings consistent with thrombus within the distal right M1 segment with attenuation of flow within the right sylvian artery branches.   Remainder of the circulation appears patent.   Right cerebral edema. No acute  hemorrhage.      CT-CTA NECK WITH & W/O-POST PROCESSING   Final Result      CT angiogram of the neck within normal limits.      CT-HEAD W/O   Final Result      Suspect right cerebral edema with mild right-sided mass effect and 3 mm right to left midline shift.   No acute intracranial hemorrhage.   No hydrocephalus.      CT-CEREBRAL PERFUSION ANALYSIS   Final Result      1.  CT perfusion examination over the limited section of brain reveals 11 mL of brain parenchyma has less than 30% of cerebral blood flow (CBF).      2.  Please note that the cerebral perfusion was performed on the limited brain tissue around the basal ganglia region. Infarct/ischemia outside the CT perfusion sections can be missed in this study.      DX-CHEST-PORTABLE (1 VIEW)   Final Result      Endotracheal tube terminates above the lefty. Enteric tube projects over the stomach.   Mild lung base atelectasis.      OUTSIDE IMAGES-CT HEAD   Final Result      OUTSIDE IMAGES-DX CHEST   Final Result      MR-BRAIN-W/O    (Results Pending)       Problem and Plan:    Acute R sided CVA  R M1 distal thromboses, s/p thrombectomy 9/6/18  Cerebral edema, per CT scan causing 3mm R to L shift  Tobacco use  Acute hypoxic respiratory failure requiring intubation    Intubated, will need to assess neurologically once extubated  ASA, statin  MRI pending  Echo pending  PT/OT, speech   Smoking cessation counseling

## 2018-09-07 NOTE — CARE PLAN
Problem: Ventilation Defect:  Goal: Ability to achieve and maintain unassisted ventilation or tolerate decreased levels of ventilator support    Intervention: Support and monitor invasive and noninvasive mechanical ventilation  Adult Ventilation Update    Total Vent Days: 2  Vent: APVCMV 24/450/+8/30%  ETT: 7.5@25    Patient Lines/Drains/Airways Status    Active Airway     Name: Placement date: Placement time: Site: Days:    Airway Group ET Tube Oral 7.5 09/06/18      Oral   2                 Plateau Pressure (Q Shift): 12 (09/06/18 1912)  Static Compliance (ml / cm H2O): 65.7 (09/07/18 0238)    Events/Summary/Plan: ABG Pa02 107 so 02 titrated down to 30% (09/07/18 0438)    No other changes made overnight.

## 2018-09-07 NOTE — RESPIRATORY CARE
Ventilator Weaning Update    Patient is on vent day 2.  SBT was tolerated for a minimum of 3 hours on settings of 5/8/30%.    Wean parameters for this SBT were:  #FVC / Vital Capacity (liters) :  (Not following commands) (09/07/18 1425)  NIF (cm H2O) :  (Not following commands) (09/07/18 1425)  Rapid Shallow Breathing Index (RR/VT): 64 (09/07/18 1425)  RR (bpm): 23 (09/07/18 1425)  Spontaneous VE: 9.3 (09/07/18 1425)  Spontaneous VT: 426 (09/07/18 1425)    Recommendation: wean as tolerated.          Events/Summary/Plan: END SBT, 3 hours of SBT (09/07/18 6027)

## 2018-09-07 NOTE — PROGRESS NOTES
Cortrak Placement    Tube Team verified patient name and medical record number prior to tube placement.  Cortrak tube (55 inches, 10 Pakistani) placed at 80 cm in left nare.  Per Cortrak picture, tube appears to be in the small bowel.  Nursing Instructions: Awaiting KUB to confirm placement before use for medications or feeding. Once placement confirmed, flush tube with 30 ml of water, and then remove and save stylet, in patient medication drawer.

## 2018-09-07 NOTE — RESPIRATORY CARE
Adult Ventilation Update    Total Vent Days: 2    Patient Lines/Drains/Airways Status    Active Airway     Name: Placement date: Placement time: Site: Days:    Airway Group ET Tube Oral 7.5 09/06/18      Oral   1              In the last 24 hours, the patient tolerated SBT for three hours on settings of 5/8/30%    #FVC / Vital Capacity (liters) :  (Not following commands) (09/07/18 1425)  NIF (cm H2O) :  (Not following commands) (09/07/18 1425)  Rapid Shallow Breathing Index (RR/VT): 64 (09/07/18 1425)  Plateau Pressure (Q Shift): 12 (09/06/18 1912)  Static Compliance (ml / cm H2O): 82.4 (09/07/18 1508)    Patient failed trials because of    Barriers to SBT Weaning Trial Stopped due to:: Pt weaned for 1 hour and returned to rest settings per protocol (09/07/18 1508)  Length of Weaning Trial Length of Weaning Trial (Hours): 3 (09/07/18 1508)      Sputum/Suction   Cough: Non Productive (09/07/18 1508)  Sputum Amount: Moderate (09/07/18 0400)  Sputum Color: Clear (09/07/18 0400)  Sputum Consistency: Thin;Thick (09/07/18 0400)    Mobility  Level of Mobility: Level II (09/07/18 0800)  Activity Performed: Unable to mobilize (09/07/18 0800)  Pt Calls for Assistance: No (09/07/18 1200)  Reason Not Mobilized: Bed rest (09/07/18 0800)  Mobilization Comments:  (Per MD orders) (09/07/18 0800)    Events/Summary/Plan: END SBT, 3 hours of SBT (09/07/18 1507)

## 2018-09-07 NOTE — CARE PLAN
Problem: Skin Integrity  Goal: Risk for impaired skin integrity will decrease    Intervention: Assess risk factors for impaired skin integrity and/or pressure ulcers  Assess for skin breakdown      Problem: Acute Care of the Stroke Patient  Goal: Optimal Outcome for the Stroke patient    Intervention: Vital Signs and Neuro Checks per order  Ensure patient has timely neuro and vital sign checks throughout night.

## 2018-09-08 ENCOUNTER — APPOINTMENT (OUTPATIENT)
Dept: RADIOLOGY | Facility: MEDICAL CENTER | Age: 53
DRG: 023 | End: 2018-09-08
Attending: INTERNAL MEDICINE
Payer: COMMERCIAL

## 2018-09-08 ENCOUNTER — APPOINTMENT (OUTPATIENT)
Dept: RADIOLOGY | Facility: MEDICAL CENTER | Age: 53
DRG: 023 | End: 2018-09-08
Attending: PSYCHIATRY & NEUROLOGY
Payer: COMMERCIAL

## 2018-09-08 PROBLEM — I42.9 CARDIOMYOPATHY (HCC): Status: ACTIVE | Noted: 2018-09-08

## 2018-09-08 LAB
ACTION RANGE TRIGGERED IACRT: NO
ANION GAP SERPL CALC-SCNC: 6 MMOL/L (ref 0–11.9)
BASE EXCESS BLDA CALC-SCNC: -3 MMOL/L (ref -4–3)
BASOPHILS # BLD AUTO: 0.6 % (ref 0–1.8)
BASOPHILS # BLD: 0.06 K/UL (ref 0–0.12)
BODY TEMPERATURE: NORMAL DEGREES
BUN SERPL-MCNC: 18 MG/DL (ref 8–22)
CALCIUM SERPL-MCNC: 8.6 MG/DL (ref 8.5–10.5)
CHLORIDE SERPL-SCNC: 111 MMOL/L (ref 96–112)
CO2 BLDA-SCNC: 20 MMOL/L (ref 20–33)
CO2 SERPL-SCNC: 20 MMOL/L (ref 20–33)
CREAT SERPL-MCNC: 0.72 MG/DL (ref 0.5–1.4)
EOSINOPHIL # BLD AUTO: 0.09 K/UL (ref 0–0.51)
EOSINOPHIL NFR BLD: 1 % (ref 0–6.9)
ERYTHROCYTE [DISTWIDTH] IN BLOOD BY AUTOMATED COUNT: 51.8 FL (ref 35.9–50)
GLUCOSE BLD-MCNC: 100 MG/DL (ref 65–99)
GLUCOSE BLD-MCNC: 102 MG/DL (ref 65–99)
GLUCOSE BLD-MCNC: 106 MG/DL (ref 65–99)
GLUCOSE BLD-MCNC: 85 MG/DL (ref 65–99)
GLUCOSE SERPL-MCNC: 113 MG/DL (ref 65–99)
HCO3 BLDA-SCNC: 19.2 MMOL/L (ref 17–25)
HCT VFR BLD AUTO: 43 % (ref 42–52)
HGB BLD-MCNC: 13 G/DL (ref 14–18)
IMM GRANULOCYTES # BLD AUTO: 0.04 K/UL (ref 0–0.11)
IMM GRANULOCYTES NFR BLD AUTO: 0.4 % (ref 0–0.9)
INST. QUALIFIED PATIENT IIQPT: YES
LYMPHOCYTES # BLD AUTO: 0.55 K/UL (ref 1–4.8)
LYMPHOCYTES NFR BLD: 5.9 % (ref 22–41)
MAGNESIUM SERPL-MCNC: 2.1 MG/DL (ref 1.5–2.5)
MCH RBC QN AUTO: 28.8 PG (ref 27–33)
MCHC RBC AUTO-ENTMCNC: 30.2 G/DL (ref 33.7–35.3)
MCV RBC AUTO: 95.3 FL (ref 81.4–97.8)
MONOCYTES # BLD AUTO: 0.78 K/UL (ref 0–0.85)
MONOCYTES NFR BLD AUTO: 8.4 % (ref 0–13.4)
NEUTROPHILS # BLD AUTO: 7.8 K/UL (ref 1.82–7.42)
NEUTROPHILS NFR BLD: 83.7 % (ref 44–72)
NRBC # BLD AUTO: 0 K/UL
NRBC BLD-RTO: 0 /100 WBC
O2/TOTAL GAS SETTING VFR VENT: 30 %
PCO2 BLDA: 26.6 MMHG (ref 26–37)
PCO2 TEMP ADJ BLDA: 27.8 MMHG (ref 26–37)
PH BLDA: 7.47 [PH] (ref 7.4–7.5)
PH TEMP ADJ BLDA: 7.45 [PH] (ref 7.4–7.5)
PHOSPHATE SERPL-MCNC: 2.4 MG/DL (ref 2.5–4.5)
PLATELET # BLD AUTO: 242 K/UL (ref 164–446)
PMV BLD AUTO: 9.6 FL (ref 9–12.9)
PO2 BLDA: 75 MMHG (ref 64–87)
PO2 TEMP ADJ BLDA: 81 MMHG (ref 64–87)
POTASSIUM SERPL-SCNC: 3.8 MMOL/L (ref 3.6–5.5)
RBC # BLD AUTO: 4.51 M/UL (ref 4.7–6.1)
SAO2 % BLDA: 96 % (ref 93–99)
SODIUM SERPL-SCNC: 137 MMOL/L (ref 135–145)
SPECIMEN DRAWN FROM PATIENT: NORMAL
WBC # BLD AUTO: 9.3 K/UL (ref 4.8–10.8)

## 2018-09-08 PROCEDURE — A9270 NON-COVERED ITEM OR SERVICE: HCPCS | Performed by: INTERNAL MEDICINE

## 2018-09-08 PROCEDURE — 83735 ASSAY OF MAGNESIUM: CPT

## 2018-09-08 PROCEDURE — A9270 NON-COVERED ITEM OR SERVICE: HCPCS | Performed by: HOSPITALIST

## 2018-09-08 PROCEDURE — 82962 GLUCOSE BLOOD TEST: CPT | Mod: 91

## 2018-09-08 PROCEDURE — 99291 CRITICAL CARE FIRST HOUR: CPT | Performed by: INTERNAL MEDICINE

## 2018-09-08 PROCEDURE — 700111 HCHG RX REV CODE 636 W/ 250 OVERRIDE (IP): Performed by: INTERNAL MEDICINE

## 2018-09-08 PROCEDURE — 700102 HCHG RX REV CODE 250 W/ 637 OVERRIDE(OP): Performed by: HOSPITALIST

## 2018-09-08 PROCEDURE — 71045 X-RAY EXAM CHEST 1 VIEW: CPT

## 2018-09-08 PROCEDURE — 80048 BASIC METABOLIC PNL TOTAL CA: CPT

## 2018-09-08 PROCEDURE — 700102 HCHG RX REV CODE 250 W/ 637 OVERRIDE(OP): Performed by: INTERNAL MEDICINE

## 2018-09-08 PROCEDURE — 700111 HCHG RX REV CODE 636 W/ 250 OVERRIDE (IP): Performed by: HOSPITALIST

## 2018-09-08 PROCEDURE — 36600 WITHDRAWAL OF ARTERIAL BLOOD: CPT

## 2018-09-08 PROCEDURE — 94150 VITAL CAPACITY TEST: CPT

## 2018-09-08 PROCEDURE — 70450 CT HEAD/BRAIN W/O DYE: CPT

## 2018-09-08 PROCEDURE — 84100 ASSAY OF PHOSPHORUS: CPT

## 2018-09-08 PROCEDURE — 700105 HCHG RX REV CODE 258: Performed by: HOSPITALIST

## 2018-09-08 PROCEDURE — 85025 COMPLETE CBC W/AUTO DIFF WBC: CPT

## 2018-09-08 PROCEDURE — 770022 HCHG ROOM/CARE - ICU (200)

## 2018-09-08 PROCEDURE — 94640 AIRWAY INHALATION TREATMENT: CPT

## 2018-09-08 PROCEDURE — 99233 SBSQ HOSP IP/OBS HIGH 50: CPT | Performed by: HOSPITALIST

## 2018-09-08 PROCEDURE — 700101 HCHG RX REV CODE 250: Performed by: HOSPITALIST

## 2018-09-08 PROCEDURE — 700101 HCHG RX REV CODE 250: Performed by: INTERNAL MEDICINE

## 2018-09-08 PROCEDURE — 94003 VENT MGMT INPAT SUBQ DAY: CPT

## 2018-09-08 PROCEDURE — 82803 BLOOD GASES ANY COMBINATION: CPT

## 2018-09-08 PROCEDURE — 700105 HCHG RX REV CODE 258: Performed by: INTERNAL MEDICINE

## 2018-09-08 PROCEDURE — 70551 MRI BRAIN STEM W/O DYE: CPT

## 2018-09-08 RX ORDER — OXYCODONE HYDROCHLORIDE 5 MG/1
5 TABLET ORAL EVERY 4 HOURS PRN
Status: DISCONTINUED | OUTPATIENT
Start: 2018-09-08 | End: 2018-09-14 | Stop reason: HOSPADM

## 2018-09-08 RX ORDER — ASPIRIN 81 MG/1
81 TABLET, CHEWABLE ORAL DAILY
Status: DISCONTINUED | OUTPATIENT
Start: 2018-09-09 | End: 2018-09-14 | Stop reason: HOSPADM

## 2018-09-08 RX ORDER — OXYCODONE HYDROCHLORIDE 10 MG/1
10 TABLET ORAL EVERY 4 HOURS PRN
Status: DISCONTINUED | OUTPATIENT
Start: 2018-09-08 | End: 2018-09-14 | Stop reason: HOSPADM

## 2018-09-08 RX ADMIN — SENNOSIDES AND DOCUSATE SODIUM 2 TABLET: 8.6; 5 TABLET ORAL at 17:55

## 2018-09-08 RX ADMIN — FENTANYL CITRATE 50 MCG: 50 INJECTION INTRAMUSCULAR; INTRAVENOUS at 01:11

## 2018-09-08 RX ADMIN — SENNOSIDES AND DOCUSATE SODIUM 2 TABLET: 8.6; 5 TABLET ORAL at 05:52

## 2018-09-08 RX ADMIN — ACETAMINOPHEN 650 MG: 325 TABLET, FILM COATED ORAL at 12:03

## 2018-09-08 RX ADMIN — OXYCODONE HYDROCHLORIDE 10 MG: 10 TABLET ORAL at 12:04

## 2018-09-08 RX ADMIN — DEXMEDETOMIDINE HYDROCHLORIDE 0.5 MCG/KG/HR: 100 INJECTION, SOLUTION INTRAVENOUS at 08:48

## 2018-09-08 RX ADMIN — SODIUM CHLORIDE: 9 INJECTION, SOLUTION INTRAVENOUS at 00:00

## 2018-09-08 RX ADMIN — FENTANYL CITRATE 100 MCG: 50 INJECTION INTRAMUSCULAR; INTRAVENOUS at 10:30

## 2018-09-08 RX ADMIN — ENOXAPARIN SODIUM 40 MG: 100 INJECTION SUBCUTANEOUS at 05:52

## 2018-09-08 RX ADMIN — IPRATROPIUM BROMIDE AND ALBUTEROL SULFATE 3 ML: .5; 3 SOLUTION RESPIRATORY (INHALATION) at 02:18

## 2018-09-08 RX ADMIN — FENTANYL CITRATE 50 MCG: 50 INJECTION INTRAMUSCULAR; INTRAVENOUS at 14:53

## 2018-09-08 RX ADMIN — IPRATROPIUM BROMIDE AND ALBUTEROL SULFATE 3 ML: .5; 3 SOLUTION RESPIRATORY (INHALATION) at 06:40

## 2018-09-08 RX ADMIN — OXYCODONE HYDROCHLORIDE 10 MG: 10 TABLET ORAL at 18:10

## 2018-09-08 RX ADMIN — DEXMEDETOMIDINE HYDROCHLORIDE 0.5 MCG/KG/HR: 100 INJECTION, SOLUTION INTRAVENOUS at 01:51

## 2018-09-08 RX ADMIN — FAMOTIDINE 20 MG: 20 TABLET ORAL at 17:56

## 2018-09-08 RX ADMIN — OXYCODONE HYDROCHLORIDE 10 MG: 10 TABLET ORAL at 22:44

## 2018-09-08 RX ADMIN — FENTANYL CITRATE 100 MCG: 50 INJECTION INTRAMUSCULAR; INTRAVENOUS at 19:40

## 2018-09-08 RX ADMIN — ATORVASTATIN CALCIUM 80 MG: 80 TABLET, FILM COATED ORAL at 17:56

## 2018-09-08 RX ADMIN — ASPIRIN 325 MG: 325 TABLET, COATED ORAL at 05:52

## 2018-09-08 RX ADMIN — OXYCODONE HYDROCHLORIDE 5 MG: 5 TABLET ORAL at 01:10

## 2018-09-08 RX ADMIN — FAMOTIDINE 20 MG: 20 TABLET ORAL at 05:52

## 2018-09-08 RX ADMIN — POTASSIUM PHOSPHATE, MONOBASIC AND POTASSIUM PHOSPHATE, DIBASIC 15 MMOL: 224; 236 INJECTION, SOLUTION INTRAVENOUS at 08:49

## 2018-09-08 ASSESSMENT — PAIN SCALES - GENERAL
PAINLEVEL_OUTOF10: 6
PAINLEVEL_OUTOF10: 8
PAINLEVEL_OUTOF10: 8
PAINLEVEL_OUTOF10: 6
PAINLEVEL_OUTOF10: 8
PAINLEVEL_OUTOF10: 6
PAINLEVEL_OUTOF10: 4
PAINLEVEL_OUTOF10: 8

## 2018-09-08 ASSESSMENT — PULMONARY FUNCTION TESTS: FVC: 1.1

## 2018-09-08 NOTE — ASSESSMENT & PLAN NOTE
Appreciate cardiology assistance  On low-dose aspirin  Continue statin  Continue carvedilol  Plan is for anticoagulation 2 weeks post stroke if remains stable

## 2018-09-08 NOTE — PROGRESS NOTES
[facility] Hospitalist Progress Note    Date of Service: 2018    Chief Complaint  53 y.o. male admitted 2018 with acute stroke underwent right M1 thrombectomy    Interval Problem Update      Follows Rt> lt  SR  SBP   precedex 1.5  TF at 50 goal 60  NS 75  VD3 tolerated  SBT 2hours          Consultants/Specialty  Neurology  Critical care    Disposition  ICU        Review of Systems   Unable to perform ROS: Intubated      Physical Exam  Laboratory/Imaging   Hemodynamics  No data recorded.   Monitored Temp: 38.4 °C (101.1 °F)  Pulse  Av.4  Min: 61  Max: 98 Heart Rate (Monitored): 74  NIBP: 111/64      Respiratory  Martinez Vent Mode: APVCMV, Rate (breaths/min): 24, PEEP/CPAP: 8, PEEP/CPAP: 8, FiO2: 30, P Peak (PIP): 20, P MEAN: 11   Respiration: (!) 21, Pulse Oximetry: 96 %     Work Of Breathing / Effort: Vented  RUL Breath Sounds: Clear, RML Breath Sounds: Clear, RLL Breath Sounds: Diminished, THAIS Breath Sounds: Clear, LLL Breath Sounds: Diminished    Fluids    Intake/Output Summary (Last 24 hours) at 18 0811  Last data filed at 18 0600   Gross per 24 hour   Intake          1007.09 ml   Output             1595 ml   Net          -587.91 ml       Nutrition  Orders Placed This Encounter   Procedures   • Diet NPO     Standing Status:   Standing     Number of Occurrences:   1     Order Specific Question:   Type:     Answer:   Now [1]     Order Specific Question:   Restrict to:     Answer:   Strict [1]     Physical Exam   Constitutional: He appears well-developed and well-nourished.   HENT:   Head: Normocephalic and atraumatic.   Mouth/Throat: Oropharynx is clear and moist. No oropharyngeal exudate.   Eyes: Pupils are equal, round, and reactive to light. Right eye exhibits no discharge. Left eye exhibits no discharge. No scleral icterus.   Neck: Neck supple. No JVD present. No tracheal deviation present.   Cardiovascular: Normal rate and regular rhythm.  Exam reveals no gallop and no friction rub.     No murmur heard.  Pulmonary/Chest: Effort normal and breath sounds normal. No respiratory distress. He exhibits no tenderness.   Intubated   Abdominal: Soft. Bowel sounds are normal. He exhibits no distension. There is no tenderness. There is no rebound and no guarding.   Musculoskeletal: He exhibits edema. He exhibits no tenderness.   Neurological: No cranial nerve deficit. He exhibits abnormal muscle tone (Left hemiparesis).   Skin: Skin is warm and dry. He is not diaphoretic. No cyanosis or erythema. Nails show no clubbing.   Psychiatric: He is slowed.   Nursing note and vitals reviewed.      Recent Labs      09/06/18   0935  09/07/18   0534  09/08/18   0505   WBC  9.7  9.9  9.3   RBC  5.08  4.56*  4.51*   HEMOGLOBIN  15.2  13.1*  13.0*   HEMATOCRIT  44.4  39.6*  43.0   MCV  87.4  86.8  95.3   MCH  29.9  28.7  28.8   MCHC  34.2  33.1*  30.2*   RDW  44.2  44.6  51.8*   PLATELETCT  287  249  242   MPV  9.5  9.8  9.6     Recent Labs      09/06/18   0935  09/07/18   0534  09/08/18   0505   SODIUM  135  138  137   POTASSIUM  4.5  3.8  3.8   CHLORIDE  107  112  111   CO2  21  20  20   GLUCOSE  113*  105*  113*   BUN  19  12  18   CREATININE  0.84  0.75  0.72   CALCIUM  8.8  8.5  8.6     Recent Labs      09/06/18   0935   APTT  27.6   INR  1.02         Recent Labs      09/06/18   0935  09/07/18   0534   TRIGLYCERIDE  335*  213*   HDL   --   27*   LDL   --   62          Assessment/Plan     * Stroke (HCC)- (present on admission)   Assessment & Plan    Patient appears to have a large right-sided stroke    Status post right M1 thrombectomy  Continue aspirin and statin  PT/OT/SLP post extubation    echo with positive bubble study and EF 45%    Telemetry monitoring        Cerebral edema (HCC)- (present on admission)   Assessment & Plan      Close clinical monitoring with serial neurologic exams  Follow-up on MRI results  Neurology following        Acute respiratory failure with hypoxia (HCC)- (present on admission)    Assessment & Plan    Intubated on 9/6/2018  Vent management per critical care discussed with Dr. Sharma patient tolerated SBT with good parameters plan for extubation after MRI          Cardiomyopathy (HCC)   Assessment & Plan    Continue aspirin statin  Will need cardiac workup when more clinically stable post extubation        Swelling of lower extremity- (present on admission)   Assessment & Plan    Lower extremity duplex negative for DVT          Quality-Core Measures   Reviewed items::  Labs reviewed, Medications reviewed and Radiology images reviewed  Wells catheter::  Unconscious / Sedated Patient on a Ventilator  DVT prophylaxis pharmacological::  Enoxaparin (Lovenox)  Ulcer Prophylaxis::  Yes      Plan of care discussed with patient's son at the bedside and his questions answered

## 2018-09-08 NOTE — CARE PLAN
Problem: Venous Thromboembolism (VTW)/Deep Vein Thrombosis (DVT) Prevention:  Goal: Patient will participate in Venous Thrombosis (VTE)/Deep Vein Thrombosis (DVT)Prevention Measures    Intervention: Ensure patient wears graduated elastic stockings (JOAN hose) and/or SCDs, if ordered, when in bed or chair (Remove at least once per shift for skin check)  Pt wearing SCD's turned on, pillows used to float heels, heel boot protector being rotated left and right feet q 2 hours.

## 2018-09-08 NOTE — PROGRESS NOTES
S- 8/10 headache throbbing pressure.  No other complaints.     O-   Allergies: No Known Allergies      Current Facility-Administered Medications:   •  potassium phosphates 15 mmol in  mL ivpb, 15 mmol, Intravenous, Once, Brayden Cramer M.D., Last Rate: 62.5 mL/hr at 09/08/18 0849, 15 mmol at 09/08/18 0849  •  aspirin (ASA) tablet 325 mg, 325 mg, Feeding Tube, DAILY, Brayden Cramer M.D., 325 mg at 09/08/18 0552  •  dexmedetomidine (PRECEDEX) 400 mcg/100mL NS premix infusion, 0.1-1.5 mcg/kg/hr, Intravenous, Continuous, Chemo Sharma M.D., Last Rate: 15.2 mL/hr at 09/08/18 0848, 0.5 mcg/kg/hr at 09/08/18 0848  •  oxyCODONE immediate-release (ROXICODONE) tablet 5 mg, 5 mg, Oral, Q4HRS PRN, 5 mg at 09/08/18 0110 **OR** oxyCODONE immediate release (ROXICODONE) tablet 10 mg, 10 mg, Oral, Q4HRS PRN, Chemo Sharma M.D.  •  NS infusion, , Intravenous, Continuous, Chemo Sharma M.D., Last Rate: 75 mL/hr at 09/08/18 0750  •  lidocaine (XYLOCAINE) 2 % injection 2 mL, 2 mL, Tracheal Tube, Q HOUR PRN, Leonardo Johnson M.D., 2 mL at 09/07/18 2029  •  labetalol (NORMODYNE,TRANDATE) injection 10 mg, 10 mg, Intravenous, Q4HRS PRN **OR** hydrALAZINE (APRESOLINE) injection 10 mg, 10 mg, Intravenous, Q2HRS PRN, Silvino Rodriguez M.D.  •  atorvastatin (LIPITOR) tablet 80 mg, 80 mg, Per NG Tube, Q EVENING, Silvino Rodriguez M.D., 80 mg at 09/07/18 1716  •  senna-docusate (PERICOLACE or SENOKOT S) 8.6-50 MG per tablet 2 Tab, 2 Tab, Per NG Tube, BID, 2 Tab at 09/08/18 0552 **AND** polyethylene glycol/lytes (MIRALAX) PACKET 1 Packet, 1 Packet, Per NG Tube, QDAY PRN **AND** magnesium hydroxide (MILK OF MAGNESIA) suspension 30 mL, 30 mL, Per NG Tube, QDAY PRN **AND** bisacodyl (DULCOLAX) suppository 10 mg, 10 mg, Rectal, QDAY PRN, Silvino Rodriguez M.D.  •  ondansetron (ZOFRAN) syringe/vial injection 4 mg, 4 mg, Intravenous, Q4HRS PRN, Silvino Rodriguez M.D.  •  ondansetron (ZOFRAN ODT) dispertab 4 mg, 4 mg, Oral, Q4HRS PRN, Silvino NICHOLS  KENDAL Rodriguez  •  promethazine (PHENERGAN) tablet 12.5-25 mg, 12.5-25 mg, Oral, Q4HRS PRN, Silvino Rodriguez M.D.  •  promethazine (PHENERGAN) suppository 12.5-25 mg, 12.5-25 mg, Rectal, Q4HRS PRN, Silvino Rodriguez M.D.  •  prochlorperazine (COMPAZINE) injection 5-10 mg, 5-10 mg, Intravenous, Q4HRS PRN, Silvino Rodriguez M.D.  •  acetaminophen (TYLENOL) tablet 650 mg, 650 mg, Per NG Tube, Q6HRS PRN, Silvino Rodriguez M.D., 650 mg at 09/07/18 1850  •  enoxaparin (LOVENOX) inj 40 mg, 40 mg, Subcutaneous, DAILY, Silvino Rodriguez M.D., 40 mg at 09/08/18 0552  •  Respiratory Care per Protocol, , Nebulization, Continuous RT, Chemo Sharma M.D.  •  MD Alert...ICU Electrolyte Replacement per Pharmacy, , Other, pharmacy to dose, Chemo Sharma M.D.  •  fentaNYL (SUBLIMAZE) injection 25 mcg, 25 mcg, Intravenous, Q HOUR PRN **OR** fentaNYL (SUBLIMAZE) injection 50 mcg, 50 mcg, Intravenous, Q HOUR PRN, 50 mcg at 09/08/18 0111 **OR** fentaNYL (SUBLIMAZE) injection 100 mcg, 100 mcg, Intravenous, Q HOUR PRN, Chemo Sharma M.D.  •  ipratropium-albuterol (DUONEB) nebulizer solution, 3 mL, Nebulization, Q2HRS PRN (RT), Chemo Sharma M.D.  •  ipratropium-albuterol (DUONEB) nebulizer solution, 3 mL, Nebulization, Q4HRS (RT), Chemo Sharma M.D., Stopped at 09/08/18 1100  •  famotidine (PEPCID) tablet 20 mg, 20 mg, Per NG Tube, Q12HRS, 20 mg at 09/08/18 0552 **OR** famotidine (PEPCID) injection 20 mg, 20 mg, Intravenous, Q12HRS, Chemo Sharma M.D., 20 mg at 09/07/18 0543  •  insulin regular (HUMULIN R) injection 2-9 Units, 2-9 Units, Subcutaneous, Q6HRS, Stopped at 09/06/18 1800 **AND** Accu-Chek Q6 if NPO, , , Q6H **AND** NOTIFY MD and PharmD, , , Once **AND** glucose 4 g chewable tablet 16 g, 16 g, Oral, Q15 MIN PRN **AND** dextrose 50% (D50W) injection 25 mL, 25 mL, Intravenous, Q15 MIN PRN, Chemo Sharma M.D.      PHYSICAL EXAM    Vitals:    09/08/18 0634 09/08/18 1020 09/08/18 1025 09/08/18 1030   BP:       Pulse:  67 69 67   Resp:   (!) 24 (!) 24 (!) 24   Temp:       SpO2: 96% 92% 93% 92%   Weight:       Height:           Head/Neck: NCAT. no meningismus neg kernig neg brudzinski. No obvious mass or heard bruit. No tender arteries or lost pulses. No rash of head or neck.    Skin: Warm, dry, intact. No rashes observed head/neck or body    Eyes/Funduscopic: unable    Mental Status: Awake, alert, oriented x 3. Name/repeat/fluent/command follow intact. No neglect/extinction. Attention and concentration, recent & remote memory, Fund of Knowledge wnl.     Cranial Nerves: left low face weak, sens loss mild, dysarthria CN II-XII intact. PERRL 3mm.   No afferent pupillary defect. EOM full. VF loss left. No nystagmus.       Motor:  bulk & tone wnl. Left side weakness severe other intact, no abn mvmts.       Sensory: loss left     Coordination: dysmetria absent    DTR's: intact/sym. no clonus. Toes mute.    Gait/Station: n/a fall concern      Labs:  Recent Labs      09/06/18   0935  09/07/18   0534  09/08/18   0505   WBC  9.7  9.9  9.3   RBC  5.08  4.56*  4.51*   HEMOGLOBIN  15.2  13.1*  13.0*   HEMATOCRIT  44.4  39.6*  43.0   MCV  87.4  86.8  95.3   MCH  29.9  28.7  28.8   MCHC  34.2  33.1*  30.2*   RDW  44.2  44.6  51.8*   PLATELETCT  287  249  242   MPV  9.5  9.8  9.6     Recent Labs      09/06/18   0935  09/07/18   0534  09/08/18   0505   SODIUM  135  138  137   POTASSIUM  4.5  3.8  3.8   CHLORIDE  107  112  111   CO2  21  20  20   GLUCOSE  113*  105*  113*   BUN  19  12  18   CREATININE  0.84  0.75  0.72   CALCIUM  8.8  8.5  8.6     Recent Labs      09/06/18   0935   APTT  27.6   INR  1.02         Recent Labs      09/06/18   0935   TROPONINI  0.01     Recent Labs      09/06/18   0935  09/07/18   0534   TRIGLYCERIDE  335*  213*   HDL   --   27*   LDL   --   62     Recent Labs      09/06/18   0935  09/07/18   0534  09/08/18   0505   SODIUM  135  138  137   POTASSIUM  4.5  3.8  3.8   CHLORIDE  107  112  111   CO2  21  20  20   GLUCOSE  113*  105*  113*    BUN  19  12  18     Recent Labs      09/06/18   0935  09/07/18   0534  09/08/18   0505   SODIUM  135  138  137   POTASSIUM  4.5  3.8  3.8   CHLORIDE  107  112  111   CO2  21  20  20   BUN  19  12  18   CREATININE  0.84  0.75  0.72   MAGNESIUM   --   1.9  2.1   PHOSPHORUS   --   3.0  2.4*   CALCIUM  8.8  8.5  8.6     Recent Labs      09/06/18   0935   APTT  27.6   INR  1.02     No results found for this or any previous visit.           Imaging: neuroimaging reviewed and directly visualized by me  DX-CHEST-PORTABLE (1 VIEW)   Final Result         1.  Pulmonary edema and/or infiltrates are identified, which are stable since the prior exam.      ECHOCARDIOGRAM-COMP W/ CONT   Final Result      DX-ABDOMEN FOR TUBE PLACEMENT   Final Result      Enteric tube projects over the distal stomach/proximal duodenum.         DX-CHEST-PORTABLE (1 VIEW)   Final Result         1.  Mild pulmonary edema and/or infiltrates.      LE VENOUS DUPLEX   Final Result      IR-THROMBECTOMY ARTERY SECONDARY   Final Result      1. Subtotal occlusion of the right M1 segment.      2.  Successful cerebral thrombectomy performed with post intervention   angiogram demonstrating  patent patent right M1 segment and M2 and M3 branches, with a persistent occlusion of a distal small posterior division M2 branch            CT-CTA HEAD WITH & W/O-POST PROCESS   Final Result      Findings consistent with thrombus within the distal right M1 segment with attenuation of flow within the right sylvian artery branches.   Remainder of the circulation appears patent.   Right cerebral edema. No acute hemorrhage.      CT-CTA NECK WITH & W/O-POST PROCESSING   Final Result      CT angiogram of the neck within normal limits.      CT-HEAD W/O   Final Result      Suspect right cerebral edema with mild right-sided mass effect and 3 mm right to left midline shift.   No acute intracranial hemorrhage.   No hydrocephalus.      CT-CEREBRAL PERFUSION ANALYSIS   Final Result       1.  CT perfusion examination over the limited section of brain reveals 11 mL of brain parenchyma has less than 30% of cerebral blood flow (CBF).      2.  Please note that the cerebral perfusion was performed on the limited brain tissue around the basal ganglia region. Infarct/ischemia outside the CT perfusion sections can be missed in this study.      DX-CHEST-PORTABLE (1 VIEW)   Final Result      Endotracheal tube terminates above the lefty. Enteric tube projects over the stomach.   Mild lung base atelectasis.      OUTSIDE IMAGES-CT HEAD   Final Result      OUTSIDE IMAGES-DX CHEST   Final Result      MR-BRAIN-W/O    (Results Pending)     Transthoracic  Echo Report      Echocardiography Laboratory    CONCLUSIONS  No prior study is available for comparison.   Left ventricular ejection fraction is visually estimated to be 45%.  Hypokinetic distal septum.  No nural thrombus seen.  Mildly reduced left ventricular systolic function.  Normal left ventricular wall thickness.  Normal left atrial size.  Bubble study technically difficult, but evidence of  R to L interatrial   shunting.  Structurally normal aortic valve without significant stenosis or   regurgitation.  Structurally normal mitral valve without significant stenosis or   regurgitation.  Normal pericardium without effusion.  No tricuspid regurgitation.    MONSERRATLIVIVAN  Exam Date:         09/07/2018        NIHSS score:  1a. LOC:   1b. LOC Questions:   1c. LOC Commands:   2. Best Gaze:   3. Visual Fields: 2  4. Facial Paresis: 1  5a. Motor arm left: 2  5b. Motor arm right:   6a. Motor leg left: 3  6b. Motor leg right: 2 pain limits this not weakness, low back pain with mvmt of this leg  7. Limb Ataxia:   8. Sensory: 1  9. Best Language:    10. Dysarthria: 1  11. Extinction/Inattention: 2     Total NIHSS: 14     DSA 9/6/18 -   Impression:       1. Subtotal occlusion of the right M1 segment.    2.  Successful cerebral thrombectomy performed with post intervention    angiogram demonstrating  patent patent right M1 segment and M2 and M3 branches, with a persistent occlusion of a distal small posterior division M2 branch        Assessment/Plan:     R DISTAL M1 OCCLUSION, LARGE RIGHT MCA TERRITORY ACUTE ISCHEMIC STROKE WITH HEMORRHAGIC TRANSFORMATION ON MRB/SWI, S/P THROMBECTOMY 9/6     Decision NOT to give alteplase: IN ED   Contraindication for IVtPA: PER ED MD NOTE      Presumed mechanism by TOAST:  __Large Artery Atherosclerosis  __Small Vessel (Lacunar)  _x_Cardioembolic  __Other (Sickle Cell, Vasculitis, Hypercoagulable)  __Unknown     Stroke Risk factors: smoking, obesity  Antithrombotic use prior to stroke: no     Post thrombectomy protocol     CTB to assure no ICH with SWI changes on MRB    NSG evaluation for cytotoxic edema; avoid high sodium high volume, low sodium diet when safe, HOB up     Card MD consult for need for long term ICM or MARIA M; TTE ABN AS ABOVE, PFO & HYPOKIN VENT    R/O DVT     Stroke labs include lipids/TFT/a1c/trop/UA     Reduce to Aspirin 81MG PO q daily(hem trans on MRB).  If need full anticoagulation, then should wait 14 days from initial stroke symptom onset, and on approximately 9/19 if remains neuro stable could consider starting full anticoagulation at that time.  Discontinue antiplatelet when anticoagulation therapeutic.  If need full anticoagulation, CT head prior to discharge to assure no ICH prior to start of anticoagulation is advisable.     STATIN FULL DOSE CRESTOR OR LIPITOR     ACEI/ARB, HCTZ, AND/OR CCB FOR JNC GOALS SLOWLY     BG MONITOR/CNTRL, DIET & LIFESTYLE/AVOID SMOKING DISCUSSED. Discussed stroke risks & mechanism of stroke injury, all questions answered.     No further neuro workup as inpatient if aforementioned studies WNL & maintains neuro baseline.  Neuro sign off, reconsult PRN.  F/u otpt Neuro MD in coming months.        CRITICAL CARE  Upon my evaluation, this patient had a high probability of imminent or life-threatening  deterioration due to cerebrovascular accident which required my direct attention, intervention, and personal management.      I personally provided 35 minutes of total critical care time outside of time spent on separately billable/documented procedures. Time includes: review of laboratory data, review of radiology studies, discussion with consultants, discussion with family/patient, monitoring for potential decompensation.  Interventions were performed as documented above.           Reg Monique M.D.  , Neurohospitalist & Stroke  Clinical Professor, [employer]  Diplomate, Neurology & Neuroimaging

## 2018-09-08 NOTE — RESPIRATORY CARE
Extubation    Cuff leak noted yes  Stridor present no       Patient toleration yes  RCP Complete? yes  Events/Summary/Plan: ok to extubate after MRI per dr ortiz (09/08/18 1737)

## 2018-09-08 NOTE — CARE PLAN
Problem: Mobility  Goal: Risk for activity intolerance will decrease    Intervention: Encourage patient to increase activity level in collaboration with Interdisciplinary Team  Pt mobilized to edge of bed for 5 minutes

## 2018-09-08 NOTE — CARE PLAN
Problem: Communication  Goal: The ability to communicate needs accurately and effectively will improve    Intervention: Educate patient and significant other/support system about the plan of care, procedures, treatments, medications and allow for questions  Family updated on POC.  All questions answered.       Problem: Acute Care of the Stroke Patient  Goal: Optimal Outcome for the Stroke patient  Stroke book given to Ignacio albert.  Book explained in detail.  All questions answered.

## 2018-09-08 NOTE — PROGRESS NOTES
0950  Pt transported to MRI on transport monitor, transport vent with RT, transport tech, and ICU RN X2.     1000 Pt in MRI machine. Pt tolerating procedure well. VSS.    1041 Pt returned to S 137. Attached to ICU monitor. VSS.

## 2018-09-08 NOTE — PROGRESS NOTES
Dr. Monique and Dr. Sharma at bedside. Pt assessed. Updated given. New orders received and implemented.

## 2018-09-08 NOTE — PROGRESS NOTES
On 40Pulmonary Critical Care Progress Note      DOA: 9/6/2018    Chief Complaint: cva     History of Present Illness: 53-year-old gentleman that does not seek regular   medical attention with uncertain past medical history that was reportedly in   his normal state of health last seen at 2200 last night.  Early this morning   in and around 4:00 a.m., he patient's family heard a thud.  Upon evaluating,   he was lying on the floor with arms stretched out, not really moving or   talking.  EMS was called.  The patient was taken to [place] where he was   identified of having likely CVA, was intubated for airway protection and   transferred to [facility] for further evaluation.  In discussing with son, patient   has had longstanding intermittent bilateral lower extremity edema times   years, had recently had a back injury approximately 1 week ago, was seen in   the ER and outside facility and has been using a cane to ambulate.  Otherwise,   no known medical history, but again he does not see a primary provider or   seek medical attention times years.  He smokes approximately 1 pack per day   and has done so for greater than 30 years.  Drinks alcohol once or twice per   week.  No further history at the present time currently available.      Interval Events:  24 hour interval history reviewed   Tm 101.1  -814cc over last 24hr, -2.9L since admit  S/p thrombectomy 9/6  Echo 9/7 w EF 45% and hypokinetic distal septum  K 3.8  Mg 2.1  Abg 7.46/26/75/96 on 30%    Precedex @ 0.5  NS @ 75    Addendum:  Mri showing cytogenic edema and some hemorrhagic conversion. NS consulted for further evaluation.     Review of Systems   Unable to perform ROS: Acuity of condition     Physical Exam   Constitutional: He appears well-developed and well-nourished.   HENT:   Head: Normocephalic and atraumatic.   Eyes: Pupils are equal, round, and reactive to light. Conjunctivae are normal.   Neck: No tracheal deviation present.   Cardiovascular: Normal  rate and intact distal pulses.    Pulmonary/Chest: He has no wheezes. He has no rales.   Abdominal: Soft. He exhibits no distension. There is no tenderness.   Musculoskeletal: He exhibits edema.   Neurological:   Unable to assess given sedation   Skin: Skin is warm and dry.   Psychiatric:   sedate   Nursing note and vitals reviewed.  unchanged    PFSH:  No change.    Respiratory:  Martinez Vent Mode: APVCMV, Rate (breaths/min): 24, PEEP/CPAP: 8, FiO2: 30, P Support: 5, Static Compliance (ml / cm H2O): 60, Control VTE (exp VT): 457  Pulse Oximetry: 96 %  Chest Tube Drains:            Recent Labs      09/06/18   1013  09/07/18   0435  09/08/18   0449   ISTATAPH  7.299*  7.398*  7.467   ISTATAPCO2  48.2*  35.3  26.6   ISTATAPO2  217*  106*  75   ISTATATCO2  25  23  20   [id number]  100*  98  96   ISTATARTHCO3  23.7  21.7  19.2   ISTATARTBE  -3  -3  -3   ISTATTEMP  35.7 C  99.0 F  100.4 F   ISTATFIO2  100  40  30   ISTATSPEC  Arterial  Arterial  Arterial   ISTATAPHTC  7.318*   --   7.452   [id number]  211*   --   81       HemoDynamics:  Pulse: 77, Heart Rate (Monitored): 74  NIBP: 111/64         Recent Labs      09/06/18   0935   TROPONINI  0.01       Neuro:  GCS Total Daylin Coma Score: 11         Fluids:  Intake/Output       09/06/18 0700 - 09/07/18 0659 09/07/18 0700 - 09/08/18 0659 09/08/18 0700 - 09/09/18 0659      9987-3735 0844-2113 Total 3787-8838 8896-7321 Total 6944-6218 2313-5958 Total       Intake    I.V.  341.7  431.7 773.4  362.7  192.8 555.5  --  -- --    Magnesium Sulfate Volume -- -- -- 65 -- 65 -- -- --    Precedex Volume -- -- -- 96.8 192.8 289.6 -- -- --    Propofol Volume 341.7 431.7 773.4 200.9 -- 200.9 -- -- --    Other  --  -- --  30  -- 30  --  -- --    Medications (P.O./ Enteral Liquids) -- -- -- 30 -- 30 -- -- --    Enteral  --  -- --  60  430 490  --  -- --    Free Water / Tube Flush -- -- -- 60 30 90 -- -- --    Intake (mL) (Enteral Tube 10 fr Left Nare Cortrak Gastric Feeding Tube) -- --  -- -- 400 400 -- -- --    Total Intake 341.7 431.7 773.4 452.7 622.8 1075.5 -- -- --       Output    Urine  1450  1455 2905  1190  580 1770  --  -- --    Output (mL) (Urinary Catheter Indwelling Catheter 16 fr) -- -- --  -- -- --    Output (mL) ([REMOVED] Urinary Catheter Indwelling Catheter) 1450 1455 2905 350 -- 350 -- -- --    Stool  --  -- --  --  -- --  --  -- --    Number of Times Stooled 0 x 0 x 0 x 0 x 0 x 0 x -- -- --    Emesis/NG output  0  -- 0  120  -- 120  --  -- --    Output (mL) ([REMOVED] Enteral Tube Oral Oral Gastric) 0 -- 0 120 -- 120 -- -- --    Total Output 1450 1455 2905 3518 346 4343 -- -- --       Net I/O     -1108.3 -1023.3 -2131.6 -857.3 42.8 -814.5 -- -- --        Weight: 120 kg (264 lb 8.8 oz)  Recent Labs      18   0534  18   0505   SODIUM  135  138  137   POTASSIUM  4.5  3.8  3.8   CHLORIDE  107  112  111   CO2  21  20  20   BUN  19  12  18   CREATININE  0.84  0.75  0.72   MAGNESIUM   --   1.9  2.1   PHOSPHORUS   --   3.0  2.4*   CALCIUM  8.8  8.5  8.6       GI/Nutrition:    Liver Function  Recent Labs      18   0534  18   0505   ALTSGPT  19   --    --    ASTSGOT  24   --    --    ALKPHOSPHAT  61   --    --    TBILIRUBIN  0.5   --    --    GLUCOSE  113*  105*  113*       Heme:  Recent Labs      18   0534  18   0505   RBC  5.08  4.56*  4.51*   HEMOGLOBIN  15.2  13.1*  13.0*   HEMATOCRIT  44.4  39.6*  43.0   PLATELETCT  287  249  242   PROTHROMBTM  13.1   --    --    APTT  27.6   --    --    INR  1.02   --    --        Infectious Disease:  Monitored Temp 2  Av.6 °C (99.6 °F)  Min: 37 °C (98.6 °F)  Max: 38.4 °C (101.1 °F)  Micro: cultures reviewed  Recent Labs      18   0935  18   0534  18   0505   WBC  9.7  9.9  9.3   NEUTSPOLYS  83.00*  86.00*  83.70*   LYMPHOCYTES  6.20*  4.30*  5.90*   MONOCYTES  7.10  7.30  8.40   EOSINOPHILS  2.50  1.40  1.00   BASOPHILS  0.60   0.50  0.60   ASTSGOT  24   --    --    ALTSGPT  19   --    --    ALKPHOSPHAT  61   --    --    TBILIRUBIN  0.5   --    --      Current Facility-Administered Medications   Medication Dose Frequency Provider Last Rate Last Dose   • potassium phosphates 15 mmol in  mL ivpb  15 mmol Once Brayden Cramer M.D.       • aspirin (ASA) tablet 325 mg  325 mg DAILY Brayden Cramer M.D.   325 mg at 09/08/18 0552   • dexmedetomidine (PRECEDEX) 400 mcg/100mL NS premix infusion  0.1-1.5 mcg/kg/hr Continuous Chemo Sharma M.D. 15.2 mL/hr at 09/08/18 0749 0.5 mcg/kg/hr at 09/08/18 0749   • oxyCODONE immediate-release (ROXICODONE) tablet 5 mg  5 mg Q4HRS PRN Chemo Sharma M.D.   5 mg at 09/08/18 0110    Or   • oxyCODONE immediate release (ROXICODONE) tablet 10 mg  10 mg Q4HRS PRN Chemo Sharma M.D.       • NS infusion   Continuous Chemo Sharma M.D. 75 mL/hr at 09/08/18 0750     • lidocaine (XYLOCAINE) 2 % injection 2 mL  2 mL Q HOUR PRN Leonardo Johnson M.D.   2 mL at 09/07/18 2029   • labetalol (NORMODYNE,TRANDATE) injection 10 mg  10 mg Q4HRS PRN Silvino Rodriguez M.D.        Or   • hydrALAZINE (APRESOLINE) injection 10 mg  10 mg Q2HRS PRN Silvino Rodriguez M.D.       • atorvastatin (LIPITOR) tablet 80 mg  80 mg Q EVENING Silvino Rodriguez M.D.   80 mg at 09/07/18 1716   • senna-docusate (PERICOLACE or SENOKOT S) 8.6-50 MG per tablet 2 Tab  2 Tab BID Silvino Rodriguez M.D.   2 Tab at 09/08/18 0552    And   • polyethylene glycol/lytes (MIRALAX) PACKET 1 Packet  1 Packet QDAY PRN Silvino Rodriguez M.D.        And   • magnesium hydroxide (MILK OF MAGNESIA) suspension 30 mL  30 mL QDAY PRN Silvino Rodriguez M.D.        And   • bisacodyl (DULCOLAX) suppository 10 mg  10 mg QDAY PRN Silvino Rodriguez M.D.       • ondansetron (ZOFRAN) syringe/vial injection 4 mg  4 mg Q4HRS PRN Silvino Rodriguez M.D.       • ondansetron (ZOFRAN ODT) dispertab 4 mg  4 mg Q4HRS PRN Silvino Rodriguez M.D.       • promethazine (PHENERGAN) tablet 12.5-25 mg  12.5-25 mg  Q4HRS PRN Silvino Rodriguez M.D.       • promethazine (PHENERGAN) suppository 12.5-25 mg  12.5-25 mg Q4HRS PRN Silvino Rodriguez M.D.       • prochlorperazine (COMPAZINE) injection 5-10 mg  5-10 mg Q4HRS PRN Silvino Rodriguez M.D.       • acetaminophen (TYLENOL) tablet 650 mg  650 mg Q6HRS PRN Silvino Rodriguez M.D.   650 mg at 09/07/18 1850   • enoxaparin (LOVENOX) inj 40 mg  40 mg DAILY Silvino Rodriguez M.D.   40 mg at 09/08/18 0552   • Respiratory Care per Protocol   Continuous RT Chemo Sharma M.D.       • MD Alert...ICU Electrolyte Replacement per Pharmacy   pharmacy to dose Chemo Sharma M.D.       • fentaNYL (SUBLIMAZE) injection 25 mcg  25 mcg Q HOUR PRN Chemo Sharma M.D.        Or   • fentaNYL (SUBLIMAZE) injection 50 mcg  50 mcg Q HOUR PRN Chemo Sharma M.D.   50 mcg at 09/08/18 0111    Or   • fentaNYL (SUBLIMAZE) injection 100 mcg  100 mcg Q HOUR PRN Chemo Sharma M.D.       • ipratropium-albuterol (DUONEB) nebulizer solution  3 mL Q2HRS PRN (RT) Chemo Sharma M.D.       • ipratropium-albuterol (DUONEB) nebulizer solution  3 mL Q4HRS (RT) Chemo Sharma M.D.   3 mL at 09/08/18 0640   • famotidine (PEPCID) tablet 20 mg  20 mg Q12HRS Chemo Sharma M.D.   20 mg at 09/08/18 0552    Or   • famotidine (PEPCID) injection 20 mg  20 mg Q12HRS Chemo Sharma M.D.   20 mg at 09/07/18 0543   • insulin regular (HUMULIN R) injection 2-9 Units  2-9 Units Q6HRS Chemo Sharma M.D.   Stopped at 09/06/18 1800    And   • glucose 4 g chewable tablet 16 g  16 g Q15 MIN PRN Chemo Sharma M.D.        And   • dextrose 50% (D50W) injection 25 mL  25 mL Q15 MIN PRN Chemo Sharma M.D.         Last reviewed on 9/6/2018 11:47 AM by Santana Fajardo    Quality  Measures:   Labs reviewed, Medications reviewed and Radiology images reviewed                      Assessment/Plan:  Acute right-sided cerebrovascular accident.   - not tpa candidate due to unknown timing   - s/p [facility] thrombectomy 9/6   - serial neuro  exams   - will need pt/ot/speech +/- rehab once stable   - asa + high intensity statin   - echo reviewed   - pending mri brain    Acute hypoxic respiratory failure secondary to above.   - intubated 9/6   - continue full vent support   - I am adjusting vent based on abg/pulm mechanics   - rt/02 protocols   - on precedex   - dc ivf     Chronic tobacco use.   - will  on cessation when stable    Does not seek regular medical attention.         Discussed patient condition and risk of morbidity and/or mortality with Family, RN, RT, Therapies, Pharmacy and hospitalist.    The patient remains critically ill.  Critical care time = 45 minutes in directly providing and coordinating critical care and extensive data review.  No time overlap and excludes procedures.

## 2018-09-08 NOTE — CONSULTS
Consults       Admission Date / Service Date: 09/08/18    Patient's PCP: No primary care provider on file.    Consult requested by: Dr. Reg Monique    CC: Large CVA, possible PFO       HPI:   Mr. Klein is a 52 yo who presented with large CVA, needed to be intubated. Found to have large MCA territory infarct. He underwent cerebral thrombectomy, still had distal occlusion of small poesterior branch. Echo with mildly reduced LV function, probably small PFO. CT of neck was normal. We are asked to see re: significance of PFO.    Pt now extubated. Does not regularly see a doctor. Low grade fever.  His son is at the bedside and not to found him.  He is having PVCs and some PACs on telemetry.  No atrial fibrillation or flutter.  He is having left-sided weakness, left upper extremity more than lower extremity.  Some left visual neglect as well.  He is comprehending and has intact speech.  No chest pain.  No dizziness or lightheadedness.  No significant shortness of breath.  He has an NG tube.     The nurse tells me that his most recent imaging shows some mild hemorrhagic conversion of the stroke.    LDL 62, TG up, HGB A1C pending.  BP has been ok. No FH Afib.    Data reviewed by me personally:  Current medications  Tele- sinus, PVCs and PACs  ECG 9-6-18 sinus, 93, possible old inferior MI  Echo  9-8-18 EF 45%, possible R->L shunt  MRI brain 9-8-18 moderate size acute right MCA infarct  Cxray 9-8-18 pulm infiltrates  Dopplers 9-6-18 No obvious DVT  CTA neck, normal    No FH of CVA, Afib      Medications / Drug list prior to admission:  No current facility-administered medications on file prior to encounter.      No current outpatient prescriptions on file prior to encounter.       Current list of administered Medications:    Current Facility-Administered Medications:   •  [START ON 9/9/2018] aspirin EC (ECOTRIN) tablet 81 mg, 81 mg, Oral, DAILY, Reg Monique M.D.  •  dexmedetomidine (PRECEDEX) 400 mcg/100mL NS premix  infusion, 0.1-1.5 mcg/kg/hr, Intravenous, Continuous, Chemo Sharma M.D., Last Rate: 15.2 mL/hr at 09/08/18 0848, 0.5 mcg/kg/hr at 09/08/18 0848  •  oxyCODONE immediate-release (ROXICODONE) tablet 5 mg, 5 mg, Oral, Q4HRS PRN, 5 mg at 09/08/18 0110 **OR** oxyCODONE immediate release (ROXICODONE) tablet 10 mg, 10 mg, Oral, Q4HRS PRN, Chemo Sharma M.D., 10 mg at 09/08/18 1204  •  lidocaine (XYLOCAINE) 2 % injection 2 mL, 2 mL, Tracheal Tube, Q HOUR PRN, Leonardo Johnson M.D., 2 mL at 09/07/18 2029  •  labetalol (NORMODYNE,TRANDATE) injection 10 mg, 10 mg, Intravenous, Q4HRS PRN **OR** hydrALAZINE (APRESOLINE) injection 10 mg, 10 mg, Intravenous, Q2HRS PRN, Silvino Rodriguez M.D.  •  atorvastatin (LIPITOR) tablet 80 mg, 80 mg, Per NG Tube, Q EVENING, Silvino Rodriguez M.D., 80 mg at 09/07/18 1716  •  senna-docusate (PERICOLACE or SENOKOT S) 8.6-50 MG per tablet 2 Tab, 2 Tab, Per NG Tube, BID, 2 Tab at 09/08/18 0552 **AND** polyethylene glycol/lytes (MIRALAX) PACKET 1 Packet, 1 Packet, Per NG Tube, QDAY PRN **AND** magnesium hydroxide (MILK OF MAGNESIA) suspension 30 mL, 30 mL, Per NG Tube, QDAY PRN **AND** bisacodyl (DULCOLAX) suppository 10 mg, 10 mg, Rectal, QDAY PRN, Silvino Rodriguez M.D.  •  ondansetron (ZOFRAN) syringe/vial injection 4 mg, 4 mg, Intravenous, Q4HRS PRN, Silvino Rodriguez M.D.  •  ondansetron (ZOFRAN ODT) dispertab 4 mg, 4 mg, Oral, Q4HRS PRN, Silvino Rodriguez M.D.  •  promethazine (PHENERGAN) tablet 12.5-25 mg, 12.5-25 mg, Oral, Q4HRS PRN, Silvino Rodriguez M.D.  •  promethazine (PHENERGAN) suppository 12.5-25 mg, 12.5-25 mg, Rectal, Q4HRS PRN, Silvino Rodriguez M.D.  •  prochlorperazine (COMPAZINE) injection 5-10 mg, 5-10 mg, Intravenous, Q4HRS PRN, Silvino Rodriguez M.D.  •  acetaminophen (TYLENOL) tablet 650 mg, 650 mg, Per NG Tube, Q6HRS PRN, Silvino Rodriguez M.D., 650 mg at 09/08/18 1203  •  enoxaparin (LOVENOX) inj 40 mg, 40 mg, Subcutaneous, DAILY, Silvino Rodriguez M.D., 40 mg at 09/08/18 4745  •  Respiratory Care per  Protocol, , Nebulization, Continuous RT, Chemo Sharma M.D.  •  MD Alert...ICU Electrolyte Replacement per Pharmacy, , Other, pharmacy to dose, Chemo Sharma M.D.  •  fentaNYL (SUBLIMAZE) injection 25 mcg, 25 mcg, Intravenous, Q HOUR PRN **OR** fentaNYL (SUBLIMAZE) injection 50 mcg, 50 mcg, Intravenous, Q HOUR PRN, 50 mcg at 09/08/18 1453 **OR** fentaNYL (SUBLIMAZE) injection 100 mcg, 100 mcg, Intravenous, Q HOUR PRN, Chemo Sharma M.D., 100 mcg at 09/08/18 1030  •  ipratropium-albuterol (DUONEB) nebulizer solution, 3 mL, Nebulization, Q2HRS PRN (RT), Chemo Sharma M.D.  •  famotidine (PEPCID) tablet 20 mg, 20 mg, Per NG Tube, Q12HRS, 20 mg at 09/08/18 0552 **OR** famotidine (PEPCID) injection 20 mg, 20 mg, Intravenous, Q12HRS, Chemo Sharma M.D., 20 mg at 09/07/18 0543  •  insulin regular (HUMULIN R) injection 2-9 Units, 2-9 Units, Subcutaneous, Q6HRS, Stopped at 09/06/18 1800 **AND** Accu-Chek Q6 if NPO, , , Q6H **AND** NOTIFY MD and PharmD, , , Once **AND** glucose 4 g chewable tablet 16 g, 16 g, Oral, Q15 MIN PRN **AND** dextrose 50% (D50W) injection 25 mL, 25 mL, Intravenous, Q15 MIN PRN, Chemo Sharma M.D.    No past medical history on file.    No past surgical history on file.    No family history on file.    Social History     Social History   • Marital status:      Spouse name: N/A   • Number of children: N/A   • Years of education: N/A     Occupational History   • Not on file.     Social History Main Topics   • Smoking status: Not on file   • Smokeless tobacco: Not on file   • Alcohol use Not on file   • Drug use: Unknown   • Sexual activity: Not on file     Other Topics Concern   • Not on file     Social History Narrative   • No narrative on file       No Known Allergies    Review of systems:  All others systems reviewed and negative.    Physical exam:  Patient Vitals for the past 24 hrs:   Pulse Resp SpO2 Weight   09/08/18 1131 - - 94 % -   09/08/18 1030 67 (!) 24 92 % -    09/08/18 1025 69 (!) 24 93 % -   09/08/18 1020 67 (!) 24 92 % -   09/08/18 0634 - - 96 % -   09/08/18 0600 77 (!) 21 97 % 120 kg (264 lb 8.8 oz)   09/08/18 0500 75 (!) 24 99 % -   09/08/18 0400 72 20 96 % -   09/08/18 0300 72 (!) 24 96 % -   09/08/18 0219 - - 93 % -   09/08/18 0200 73 - 93 % -   09/08/18 0000 73 (!) 23 96 % -   09/07/18 2300 72 (!) 24 97 % -   09/07/18 2206 - - 100 % -   09/07/18 2200 69 (!) 24 97 % -   09/07/18 2100 72 (!) 24 98 % -   09/07/18 2031 - - 98 % -   09/07/18 2000 71 (!) 24 91 % -   09/07/18 1901 - - 98 % -   09/07/18 1900 89 18 98 % -   09/07/18 1800 76 (!) 23 97 % -   09/07/18 1700 77 (!) 24 97 % -       General: No acute distress. Well nourished.  HEENT: EOM grossly intact, no scleral icterus, no pharyngeal erythema.  NG tube in place  Neck:  No JVD, no bruits, trachea midline  CVS: RRR some ectopy. Normal S1, S2. No M/R/G. No LE edema.  2+ radial pulses, 2+ PT pulses  Resp: Coarse breath sounds but no wheezing or rales normal respiratory effort.  Abdomen: Soft, NT, no jodi hepatomegaly, obese.  MSK/Ext: No clubbing or cyanosis.  Skin: Warm and dry, no rashes.  Neurological: CN III-XII grossly intact with the exception of dysarthria.  Gross left upper extremity and lower extremity weakness  Psych: A&O x 3, appropriate affect, appears to have good judgement    Data:  Laboratory studies:  Lab Results   Component Value Date/Time    WBC 9.3 09/08/2018 05:05 AM    RBC 4.51 (L) 09/08/2018 05:05 AM    HEMOGLOBIN 13.0 (L) 09/08/2018 05:05 AM    HEMATOCRIT 43.0 09/08/2018 05:05 AM    MCV 95.3 09/08/2018 05:05 AM    MCH 28.8 09/08/2018 05:05 AM    MCHC 30.2 (L) 09/08/2018 05:05 AM    MPV 9.6 09/08/2018 05:05 AM    NEUTSPOLYS 83.70 (H) 09/08/2018 05:05 AM    LYMPHOCYTES 5.90 (L) 09/08/2018 05:05 AM    MONOCYTES 8.40 09/08/2018 05:05 AM    EOSINOPHILS 1.00 09/08/2018 05:05 AM    BASOPHILS 0.60 09/08/2018 05:05 AM        Lab Results   Component Value Date/Time    SODIUM 137 09/08/2018 05:05 AM     POTASSIUM 3.8 09/08/2018 05:05 AM    CHLORIDE 111 09/08/2018 05:05 AM    CO2 20 09/08/2018 05:05 AM    GLUCOSE 113 (H) 09/08/2018 05:05 AM    BUN 18 09/08/2018 05:05 AM    CREATININE 0.72 09/08/2018 05:05 AM        Recent Labs      09/06/18   0935   ASTSGOT  24   ALTSGPT  19   TBILIRUBIN  0.5   ALKPHOSPHAT  61   GLOBULIN  2.5   INR  1.02       Lab Results   Component Value Date/Time    PROTHROMBTM 13.1 09/06/2018 09:35 AM    INR 1.02 09/06/2018 09:35 AM        Imaging:  CT-HEAD W/O   Final Result         1. Evolving right MCA territory infarct with slight interval increase in local mass effect from local edema. Effacement of the right frontal horn. No hydrocephalus.   2. Stable hemorrhagic products within the infarct. No progressive parenchymal hemorrhage.      MR-BRAIN-W/O   Final Result      1.  Moderate size acute right MCA territory infarct with evidence of hemorrhagic conversion.   2.  6 mm of right-to-left midline shift with effacement of the right lateral and third ventricles. No evidence of hydrocephalus.   3.  Findings of sinusitis as described above.   4.  Trace bilateral mastoid effusions. Findings could be consistent with mastoiditis in the appropriate clinical setting.      Attempts to convey these findings to Dr. PASTOR CLAYTON were initiated on 9/8/2018 2:03 PM. These findings were discussed with PASTOR CLAYTON on 9/8/2018 2:21 PM.      DX-CHEST-PORTABLE (1 VIEW)   Final Result         1.  Pulmonary edema and/or infiltrates are identified, which are stable since the prior exam.      ECHOCARDIOGRAM-COMP W/ CONT   Final Result      DX-ABDOMEN FOR TUBE PLACEMENT   Final Result      Enteric tube projects over the distal stomach/proximal duodenum.         DX-CHEST-PORTABLE (1 VIEW)   Final Result         1.  Mild pulmonary edema and/or infiltrates.      LE VENOUS DUPLEX   Final Result      IR-THROMBECTOMY ARTERY SECONDARY   Final Result      1. Subtotal occlusion of the right M1 segment.      2.   Successful cerebral thrombectomy performed with post intervention   angiogram demonstrating  patent patent right M1 segment and M2 and M3 branches, with a persistent occlusion of a distal small posterior division M2 branch            CT-CTA HEAD WITH & W/O-POST PROCESS   Final Result      Findings consistent with thrombus within the distal right M1 segment with attenuation of flow within the right sylvian artery branches.   Remainder of the circulation appears patent.   Right cerebral edema. No acute hemorrhage.      CT-CTA NECK WITH & W/O-POST PROCESSING   Final Result      CT angiogram of the neck within normal limits.      CT-HEAD W/O   Final Result      Suspect right cerebral edema with mild right-sided mass effect and 3 mm right to left midline shift.   No acute intracranial hemorrhage.   No hydrocephalus.      CT-CEREBRAL PERFUSION ANALYSIS   Final Result      1.  CT perfusion examination over the limited section of brain reveals 11 mL of brain parenchyma has less than 30% of cerebral blood flow (CBF).      2.  Please note that the cerebral perfusion was performed on the limited brain tissue around the basal ganglia region. Infarct/ischemia outside the CT perfusion sections can be missed in this study.      DX-CHEST-PORTABLE (1 VIEW)   Final Result      Endotracheal tube terminates above the lefty. Enteric tube projects over the stomach.   Mild lung base atelectasis.      OUTSIDE IMAGES-CT HEAD   Final Result      OUTSIDE IMAGES-DX CHEST   Final Result          Principal Problem:    Stroke (HCC) POA: Yes  Active Problems:    Cerebral edema (HCC) POA: Yes    Acute respiratory failure with hypoxia (HCC) POA: Yes    Swelling of lower extremity POA: Yes    Cardiomyopathy (HCC) POA: Unknown  Resolved Problems:    * No resolved hospital problems. *      Assessment / Plan:  1. Right MCA large territory CVA  2. Probable PFO  3.  PACs, can be a precursor for atrial fibrillation.    -Case discussed with Dr. Finney  Kiner  -ASA, statin for now.  -30 monitor for afib or loop recorder  -Consider MARIA M to assess ASD or PFO.  High risk features would be a PFO greater than 2 mm or on aneurysmal intra-atrial septum.  -I would have a low threshold to initiate full anticoagulation after 10-14 days to cover him for atrial fibrillation given this type of stroke.  He did have hemorrhagic conversion so I defer the final decision to his neurologist.      It is my pleasure to participate in the care of Mr. Klein.  Please do not hesitate to contact me with questions or concerns.    Coco Bateman MD, [facility]  Cardiologist [facility]    Please note that this dictation was created using voice recognition software. I have made every reasonable attempt to correct obvious errors, but it is possible there are errors of grammar and possibly content that I did not discover before finalizing the note.

## 2018-09-09 ENCOUNTER — APPOINTMENT (OUTPATIENT)
Dept: RADIOLOGY | Facility: MEDICAL CENTER | Age: 53
DRG: 023 | End: 2018-09-09
Attending: HOSPITALIST
Payer: COMMERCIAL

## 2018-09-09 ENCOUNTER — APPOINTMENT (OUTPATIENT)
Dept: RADIOLOGY | Facility: MEDICAL CENTER | Age: 53
DRG: 023 | End: 2018-09-09
Attending: INTERNAL MEDICINE
Payer: COMMERCIAL

## 2018-09-09 PROBLEM — E87.6 HYPOKALEMIA: Status: ACTIVE | Noted: 2018-09-09

## 2018-09-09 LAB
ANION GAP SERPL CALC-SCNC: 8 MMOL/L (ref 0–11.9)
BASOPHILS # BLD AUTO: 0.7 % (ref 0–1.8)
BASOPHILS # BLD: 0.06 K/UL (ref 0–0.12)
BUN SERPL-MCNC: 19 MG/DL (ref 8–22)
CALCIUM SERPL-MCNC: 8.8 MG/DL (ref 8.5–10.5)
CHLORIDE SERPL-SCNC: 107 MMOL/L (ref 96–112)
CO2 SERPL-SCNC: 22 MMOL/L (ref 20–33)
CREAT SERPL-MCNC: 0.67 MG/DL (ref 0.5–1.4)
EOSINOPHIL # BLD AUTO: 0.32 K/UL (ref 0–0.51)
EOSINOPHIL NFR BLD: 3.5 % (ref 0–6.9)
ERYTHROCYTE [DISTWIDTH] IN BLOOD BY AUTOMATED COUNT: 44.7 FL (ref 35.9–50)
GLUCOSE BLD-MCNC: 80 MG/DL (ref 65–99)
GLUCOSE BLD-MCNC: 84 MG/DL (ref 65–99)
GLUCOSE BLD-MCNC: 88 MG/DL (ref 65–99)
GLUCOSE BLD-MCNC: 92 MG/DL (ref 65–99)
GLUCOSE BLD-MCNC: 94 MG/DL (ref 65–99)
GLUCOSE BLD-MCNC: 98 MG/DL (ref 65–99)
GLUCOSE SERPL-MCNC: 92 MG/DL (ref 65–99)
HCT VFR BLD AUTO: 43.2 % (ref 42–52)
HGB BLD-MCNC: 14.4 G/DL (ref 14–18)
IMM GRANULOCYTES # BLD AUTO: 0.06 K/UL (ref 0–0.11)
IMM GRANULOCYTES NFR BLD AUTO: 0.7 % (ref 0–0.9)
LYMPHOCYTES # BLD AUTO: 0.63 K/UL (ref 1–4.8)
LYMPHOCYTES NFR BLD: 6.8 % (ref 22–41)
MAGNESIUM SERPL-MCNC: 1.9 MG/DL (ref 1.5–2.5)
MCH RBC QN AUTO: 29.3 PG (ref 27–33)
MCHC RBC AUTO-ENTMCNC: 33.3 G/DL (ref 33.7–35.3)
MCV RBC AUTO: 88 FL (ref 81.4–97.8)
MONOCYTES # BLD AUTO: 0.73 K/UL (ref 0–0.85)
MONOCYTES NFR BLD AUTO: 7.9 % (ref 0–13.4)
NEUTROPHILS # BLD AUTO: 7.4 K/UL (ref 1.82–7.42)
NEUTROPHILS NFR BLD: 80.4 % (ref 44–72)
NRBC # BLD AUTO: 0 K/UL
NRBC BLD-RTO: 0 /100 WBC
PHOSPHATE SERPL-MCNC: 1.7 MG/DL (ref 2.5–4.5)
PLATELET # BLD AUTO: 280 K/UL (ref 164–446)
PMV BLD AUTO: 9.9 FL (ref 9–12.9)
POTASSIUM SERPL-SCNC: 3.4 MMOL/L (ref 3.6–5.5)
RBC # BLD AUTO: 4.91 M/UL (ref 4.7–6.1)
SODIUM SERPL-SCNC: 137 MMOL/L (ref 135–145)
WBC # BLD AUTO: 9.2 K/UL (ref 4.8–10.8)

## 2018-09-09 PROCEDURE — 770020 HCHG ROOM/CARE - TELE (206)

## 2018-09-09 PROCEDURE — 71045 X-RAY EXAM CHEST 1 VIEW: CPT

## 2018-09-09 PROCEDURE — 700101 HCHG RX REV CODE 250: Performed by: HOSPITALIST

## 2018-09-09 PROCEDURE — 85025 COMPLETE CBC W/AUTO DIFF WBC: CPT

## 2018-09-09 PROCEDURE — 302136 NUTRITION PUMP: Performed by: HOSPITALIST

## 2018-09-09 PROCEDURE — 99233 SBSQ HOSP IP/OBS HIGH 50: CPT | Performed by: INTERNAL MEDICINE

## 2018-09-09 PROCEDURE — 700102 HCHG RX REV CODE 250 W/ 637 OVERRIDE(OP): Performed by: HOSPITALIST

## 2018-09-09 PROCEDURE — 99232 SBSQ HOSP IP/OBS MODERATE 35: CPT | Performed by: HOSPITALIST

## 2018-09-09 PROCEDURE — 700105 HCHG RX REV CODE 258: Performed by: HOSPITALIST

## 2018-09-09 PROCEDURE — 700102 HCHG RX REV CODE 250 W/ 637 OVERRIDE(OP): Performed by: INTERNAL MEDICINE

## 2018-09-09 PROCEDURE — 700102 HCHG RX REV CODE 250 W/ 637 OVERRIDE(OP): Performed by: PSYCHIATRY & NEUROLOGY

## 2018-09-09 PROCEDURE — A9270 NON-COVERED ITEM OR SERVICE: HCPCS | Performed by: PSYCHIATRY & NEUROLOGY

## 2018-09-09 PROCEDURE — 700111 HCHG RX REV CODE 636 W/ 250 OVERRIDE (IP): Performed by: HOSPITALIST

## 2018-09-09 PROCEDURE — 80048 BASIC METABOLIC PNL TOTAL CA: CPT

## 2018-09-09 PROCEDURE — 700111 HCHG RX REV CODE 636 W/ 250 OVERRIDE (IP): Performed by: INTERNAL MEDICINE

## 2018-09-09 PROCEDURE — A9270 NON-COVERED ITEM OR SERVICE: HCPCS | Performed by: HOSPITALIST

## 2018-09-09 PROCEDURE — 51798 US URINE CAPACITY MEASURE: CPT

## 2018-09-09 PROCEDURE — 83735 ASSAY OF MAGNESIUM: CPT

## 2018-09-09 PROCEDURE — 82962 GLUCOSE BLOOD TEST: CPT

## 2018-09-09 PROCEDURE — 84100 ASSAY OF PHOSPHORUS: CPT

## 2018-09-09 PROCEDURE — A9270 NON-COVERED ITEM OR SERVICE: HCPCS | Performed by: INTERNAL MEDICINE

## 2018-09-09 RX ORDER — CARVEDILOL 6.25 MG/1
3.12 TABLET ORAL 2 TIMES DAILY WITH MEALS
Status: DISCONTINUED | OUTPATIENT
Start: 2018-09-09 | End: 2018-09-14 | Stop reason: HOSPADM

## 2018-09-09 RX ORDER — MAGNESIUM SULFATE HEPTAHYDRATE 40 MG/ML
2 INJECTION, SOLUTION INTRAVENOUS ONCE
Status: COMPLETED | OUTPATIENT
Start: 2018-09-09 | End: 2018-09-09

## 2018-09-09 RX ADMIN — CARVEDILOL 3.12 MG: 6.25 TABLET, FILM COATED ORAL at 08:38

## 2018-09-09 RX ADMIN — POTASSIUM PHOSPHATE, MONOBASIC AND POTASSIUM PHOSPHATE, DIBASIC 30 MMOL: 224; 236 INJECTION, SOLUTION INTRAVENOUS at 08:40

## 2018-09-09 RX ADMIN — FAMOTIDINE 20 MG: 20 TABLET ORAL at 05:02

## 2018-09-09 RX ADMIN — FENTANYL CITRATE 100 MCG: 50 INJECTION INTRAMUSCULAR; INTRAVENOUS at 01:27

## 2018-09-09 RX ADMIN — SENNOSIDES AND DOCUSATE SODIUM 2 TABLET: 8.6; 5 TABLET ORAL at 18:34

## 2018-09-09 RX ADMIN — OXYCODONE HYDROCHLORIDE 10 MG: 10 TABLET ORAL at 02:44

## 2018-09-09 RX ADMIN — ASPIRIN 81 MG: 81 TABLET, CHEWABLE ORAL at 05:02

## 2018-09-09 RX ADMIN — FENTANYL CITRATE 100 MCG: 50 INJECTION INTRAMUSCULAR; INTRAVENOUS at 05:02

## 2018-09-09 RX ADMIN — OXYCODONE HYDROCHLORIDE 10 MG: 10 TABLET ORAL at 16:29

## 2018-09-09 RX ADMIN — OXYCODONE HYDROCHLORIDE 10 MG: 10 TABLET ORAL at 08:38

## 2018-09-09 RX ADMIN — ATORVASTATIN CALCIUM 80 MG: 80 TABLET, FILM COATED ORAL at 18:34

## 2018-09-09 RX ADMIN — CARVEDILOL 3.12 MG: 6.25 TABLET, FILM COATED ORAL at 18:35

## 2018-09-09 RX ADMIN — ENOXAPARIN SODIUM 40 MG: 100 INJECTION SUBCUTANEOUS at 05:02

## 2018-09-09 RX ADMIN — ACETAMINOPHEN 650 MG: 325 TABLET, FILM COATED ORAL at 20:52

## 2018-09-09 RX ADMIN — OXYCODONE HYDROCHLORIDE 10 MG: 10 TABLET ORAL at 23:06

## 2018-09-09 RX ADMIN — MAGNESIUM SULFATE IN WATER 2 G: 40 INJECTION, SOLUTION INTRAVENOUS at 08:38

## 2018-09-09 RX ADMIN — SENNOSIDES AND DOCUSATE SODIUM 2 TABLET: 8.6; 5 TABLET ORAL at 05:02

## 2018-09-09 ASSESSMENT — ENCOUNTER SYMPTOMS
DOUBLE VISION: 0
SPUTUM PRODUCTION: 0
HEMOPTYSIS: 0
BLOOD IN STOOL: 0
NERVOUS/ANXIOUS: 0
EYE DISCHARGE: 0
SHORTNESS OF BREATH: 0
COUGH: 0
NECK PAIN: 0
FOCAL WEAKNESS: 1
ORTHOPNEA: 0
CHILLS: 0
PALPITATIONS: 0
ABDOMINAL PAIN: 0
DEPRESSION: 0
BLURRED VISION: 0
HALLUCINATIONS: 0
STRIDOR: 0
MEMORY LOSS: 0
SPEECH CHANGE: 0
BACK PAIN: 0
FLANK PAIN: 0
COUGH: 1
EYE PAIN: 0
EYE REDNESS: 0
VOMITING: 0
HEADACHES: 1
WEAKNESS: 1
LOSS OF CONSCIOUSNESS: 0
FALLS: 0
NAUSEA: 0
FEVER: 0
SEIZURES: 0
DIARRHEA: 0

## 2018-09-09 ASSESSMENT — COPD QUESTIONNAIRES
DURING THE PAST 4 WEEKS HOW MUCH DID YOU FEEL SHORT OF BREATH: SOME OF THE TIME
DO YOU EVER COUGH UP ANY MUCUS OR PHLEGM?: YES, EVERY DAY
COPD SCREENING SCORE: 6
HAVE YOU SMOKED AT LEAST 100 CIGARETTES IN YOUR ENTIRE LIFE: YES

## 2018-09-09 ASSESSMENT — PAIN SCALES - GENERAL
PAINLEVEL_OUTOF10: 8
PAINLEVEL_OUTOF10: 0
PAINLEVEL_OUTOF10: 8
PAINLEVEL_OUTOF10: 10
PAINLEVEL_OUTOF10: 8
PAINLEVEL_OUTOF10: 7
PAINLEVEL_OUTOF10: 3
PAINLEVEL_OUTOF10: 10
PAINLEVEL_OUTOF10: 9
PAINLEVEL_OUTOF10: 8
PAINLEVEL_OUTOF10: 9

## 2018-09-09 ASSESSMENT — LIFESTYLE VARIABLES
SUBSTANCE_ABUSE: 0
EVER_SMOKED: YES
PACK_YEARS: 20

## 2018-09-09 NOTE — PROGRESS NOTES
On 40Pulmonary Critical Care Progress Note      DOA: 9/6/2018    Chief Complaint: cva     History of Present Illness: 53-year-old gentleman that does not seek regular   medical attention with uncertain past medical history that was reportedly in   his normal state of health last seen at 2200 last night.  Early this morning   in and around 4:00 a.m., he patient's family heard a thud.  Upon evaluating,   he was lying on the floor with arms stretched out, not really moving or   talking.  EMS was called.  The patient was taken to [place] where he was   identified of having likely CVA, was intubated for airway protection and   transferred to [facility] for further evaluation.  In discussing with son, patient   has had longstanding intermittent bilateral lower extremity edema times   years, had recently had a back injury approximately 1 week ago, was seen in   the ER and outside facility and has been using a cane to ambulate.  Otherwise,   no known medical history, but again he does not see a primary provider or   seek medical attention times years.  He smokes approximately 1 pack per day   and has done so for greater than 30 years.  Drinks alcohol once or twice per   week.  No further history at the present time currently available.      Interval Events:  24 hour interval history reviewed   Tm 100.2  +324cc over last 24hr, -2.6L since admit  S/p thrombectomy 9/6  Echo 9/7 w EF 45% and hypokinetic distal septum  K 3.4  Mg 1.9    Precedex @ off      Review of Systems   Constitutional: Negative for chills and fever.   HENT: Negative for congestion.    Eyes: Negative for blurred vision and double vision.   Respiratory: Positive for cough. Negative for sputum production and shortness of breath.    Cardiovascular: Negative for chest pain and palpitations.   Gastrointestinal: Negative for abdominal pain, nausea and vomiting.   Neurological: Positive for focal weakness and weakness.   Psychiatric/Behavioral: Negative for  depression.   All other systems reviewed and are negative.    Physical Exam   Constitutional: He appears well-developed and well-nourished.   HENT:   Head: Normocephalic and atraumatic.   Eyes: Pupils are equal, round, and reactive to light. Conjunctivae are normal.   Neck: No tracheal deviation present.   Cardiovascular: Normal rate and intact distal pulses.    Pulmonary/Chest: He has no wheezes. He has no rales.   Abdominal: Soft. He exhibits no distension. There is no tenderness.   Musculoskeletal: He exhibits no edema.   Neurological:   Motor 5/5 on right, 4/5 on left    Skin: Skin is warm and dry.   Psychiatric: He has a normal mood and affect.   Nursing note and vitals reviewed.  unchanged    PFSH:  No change.    Respiratory:     Pulse Oximetry: 96 %  Chest Tube Drains:            Recent Labs      09/06/18   1013  09/07/18   0435  09/08/18   0449   ISTATAPH  7.299*  7.398*  7.467   ISTATAPCO2  48.2*  35.3  26.6   ISTATAPO2  217*  106*  75   ISTATATCO2  25  23  20   CYMHEJH6OSU  100*  98  96   ISTATARTHCO3  23.7  21.7  19.2   ISTATARTBE  -3  -3  -3   ISTATTEMP  35.7 C  99.0 F  100.4 F   ISTATFIO2  100  40  30   ISTATSPEC  Arterial  Arterial  Arterial   ISTATAPHTC  7.318*   --   7.452   ZIZIKAFK1UY  211*   --   81       HemoDynamics:  Pulse: 74, Heart Rate (Monitored): 73  NIBP: 151/88         Recent Labs      09/06/18   0935   TROPONINI  0.01       Neuro:  GCS Total Daylin Coma Score: 15         Fluids:  Intake/Output       09/07/18 0700 - 09/08/18 0659 09/08/18 0700 - 09/09/18 0659 09/09/18 0700 - 09/10/18 0659      7759-5888 2344-6832 Total 1121-7140 2611-5849 Total 7246-2906 9552-0699 Total       Intake    I.V.  362.7  192.8 555.5  614  20 634  --  -- --    Magnesium Sulfate Volume 65 -- 65 -- -- -- -- -- --    Precedex Volume 96.8 192.8 289.6 114 -- 114 -- -- --    Propofol Volume 200.9 -- 200.9 -- -- -- -- -- --    IV Piggyback Volume (IV Piggyback) -- -- -- 250 -- 250 -- -- --    IV Volume (TKO) -- -- --  250 20 270 -- -- --    Other  30  -- 30  60  60 120  --  -- --    Medications (P.O./ Enteral Liquids) 30 -- 30 60 60 120 -- -- --    Enteral  60  430 490  770  900 1670  --  -- --    Free Water / Tube Flush 60 30 90 90 180 270 -- -- --    Intake (mL) (Enteral Tube 10 fr Left Nare Cortrak Gastric Feeding Tube) -- 400 400  -- -- --    Total Intake 452.7 622.8 1075.5 2315 831 7593 -- -- --       Output    Urine  1190  580 1770  855  1245 2100  --  -- --    Output (mL) (Urinary Catheter Indwelling Catheter 16 fr)  855 1245 2100 -- -- --    Output (mL) ([REMOVED] Urinary Catheter Indwelling Catheter) 350 -- 350 -- -- -- -- -- --    Stool  --  -- --  --  -- --  --  -- --    Number of Times Stooled 0 x 0 x 0 x 0 x 1 x 1 x -- -- --    Emesis/NG output  120  -- 120  --  -- --  --  -- --    Output (mL) ([REMOVED] Enteral Tube Oral Oral Gastric) 120 -- 120 -- -- -- -- -- --    Total Output 9701 429 7099 855 1245 2100 -- -- --       Net I/O     -857.3 42.8 -814.5 589 -265 324 -- -- --        Weight: 119 kg (262 lb 5.6 oz)  Recent Labs      09/07/18   0534  09/08/18   0505 09/09/18 0512   SODIUM  138  137  137   POTASSIUM  3.8  3.8  3.4*   CHLORIDE  112  111  107   CO2  20  20  22   BUN  12  18  19   CREATININE  0.75  0.72  0.67   MAGNESIUM  1.9  2.1  1.9   PHOSPHORUS  3.0  2.4*  1.7*   CALCIUM  8.5  8.6  8.8       GI/Nutrition:    Liver Function  Recent Labs      09/06/18   0935  09/07/18   0534  09/08/18   0505 09/09/18   0512   ALTSGPT  19   --    --    --    ASTSGOT  24   --    --    --    ALKPHOSPHAT  61   --    --    --    TBILIRUBIN  0.5   --    --    --    GLUCOSE  113*  105*  113*  92       Heme:  Recent Labs      09/06/18   0935  09/07/18   0534  09/08/18   0505  09/09/18   0512   RBC  5.08  4.56*  4.51*  4.91   HEMOGLOBIN  15.2  13.1*  13.0*  14.4   HEMATOCRIT  44.4  39.6*  43.0  43.2   PLATELETCT  287  249  242  280   PROTHROMBTM  13.1   --    --    --    APTT  27.6   --    --    --    INR   1.02   --    --    --        Infectious Disease:  Monitored Temp 2  Av.3 °C (99.2 °F)  Min: 37 °C (98.6 °F)  Max: 37.9 °C (100.2 °F)  Micro: cultures reviewed  Recent Labs      1835  18   0534  18   0505  18   0512   WBC  9.7  9.9  9.3  9.2   NEUTSPOLYS  83.00*  86.00*  83.70*  80.40*   LYMPHOCYTES  6.20*  4.30*  5.90*  6.80*   MONOCYTES  7.10  7.30  8.40  7.90   EOSINOPHILS  2.50  1.40  1.00  3.50   BASOPHILS  0.60  0.50  0.60  0.70   ASTSGOT  24   --    --    --    ALTSGPT  19   --    --    --    ALKPHOSPHAT  61   --    --    --    TBILIRUBIN  0.5   --    --    --      Current Facility-Administered Medications   Medication Dose Frequency Provider Last Rate Last Dose   • potassium phosphates 30 mmol in D5W 500 mL ivpb  30 mmol Once Brayden Cramer M.D.       • magnesium sulfate IVPB premix 2 g  2 g Once Brayden Cramer M.D.       • oxyCODONE immediate release (ROXICODONE) tablet 10 mg  10 mg Q4HRS PRN Brayden Cramer M.D.   10 mg at 18 0244    Or   • oxyCODONE immediate-release (ROXICODONE) tablet 5 mg  5 mg Q4HRS PRN Brayden Cramer M.D.       • aspirin (ASA) chewable tab 81 mg  81 mg DAILY Reg Monique M.D.   81 mg at 18 0502   • dexmedetomidine (PRECEDEX) 400 mcg/100mL NS premix infusion  0.1-1.5 mcg/kg/hr Continuous Chemo Sharma M.D.   Stopped at 18 1130   • lidocaine (XYLOCAINE) 2 % injection 2 mL  2 mL Q HOUR PRN Leonardo Johnson M.D.   2 mL at 18   • labetalol (NORMODYNE,TRANDATE) injection 10 mg  10 mg Q4HRS PRN Silvino Rodriguez M.D.        Or   • hydrALAZINE (APRESOLINE) injection 10 mg  10 mg Q2HRS PRN Silvino Rodriguez M.D.       • atorvastatin (LIPITOR) tablet 80 mg  80 mg Q EVENING Silvino Rodriguez M.D.   80 mg at 18 1756   • senna-docusate (PERICOLACE or SENOKOT S) 8.6-50 MG per tablet 2 Tab  2 Tab BID Silvino Rodriguez M.D.   2 Tab at 18 0502    And   • polyethylene glycol/lytes (MIRALAX) PACKET 1 Packet  1 Packet  QDAY PRN Silvino Rodriguez M.D.        And   • magnesium hydroxide (MILK OF MAGNESIA) suspension 30 mL  30 mL QDAY PRN Silvino Rodriguez M.D.        And   • bisacodyl (DULCOLAX) suppository 10 mg  10 mg QDAY PRN Silvino Rodriguez M.D.       • ondansetron (ZOFRAN) syringe/vial injection 4 mg  4 mg Q4HRS PRN Silvino Rodriguez M.D.       • promethazine (PHENERGAN) tablet 12.5-25 mg  12.5-25 mg Q4HRS PRN Silvino Rodriguez M.D.       • promethazine (PHENERGAN) suppository 12.5-25 mg  12.5-25 mg Q4HRS PRN Silvino Rodriguez M.D.       • prochlorperazine (COMPAZINE) injection 5-10 mg  5-10 mg Q4HRS PRN Silvino Rodriguez M.D.       • acetaminophen (TYLENOL) tablet 650 mg  650 mg Q6HRS PRN Silvino Rodriguez M.D.   650 mg at 09/08/18 1203   • Respiratory Care per Protocol   Continuous RT Chemo Sharma M.D.       • MD Alert...ICU Electrolyte Replacement per Pharmacy   pharmacy to dose Chemo Sharma M.D.       • fentaNYL (SUBLIMAZE) injection 25 mcg  25 mcg Q HOUR PRN Chemo Sharma M.D.        Or   • fentaNYL (SUBLIMAZE) injection 50 mcg  50 mcg Q HOUR PRN Chmeo Sharma M.D.   50 mcg at 09/08/18 1453    Or   • fentaNYL (SUBLIMAZE) injection 100 mcg  100 mcg Q HOUR PRN Chemo Sharma M.D.   100 mcg at 09/09/18 0502   • ipratropium-albuterol (DUONEB) nebulizer solution  3 mL Q2HRS PRN (RT) Chemo Sharma M.D.       • famotidine (PEPCID) tablet 20 mg  20 mg Q12HRS Chemo Sharma M.D.   20 mg at 09/09/18 0502   • insulin regular (HUMULIN R) injection 2-9 Units  2-9 Units Q6HRS Chemo Sharma M.D.   Stopped at 09/06/18 1800    And   • dextrose 50% (D50W) injection 25 mL  25 mL Q15 MIN PRN Chemo Sharma M.D.         Last reviewed on 9/6/2018 11:47 AM by Santana Fajardo    Quality  Measures:  Labs reviewed, Medications reviewed and Radiology images reviewed                      Assessment/Plan:  Acute right-sided cerebrovascular accident. - clinically suggestive of cardio embolic origin    - not tpa candidate due to unknown timing   -  s/p [facility] thrombectomy 9/6   - serial neuro exams -> q4 hr   - will need pt/ot/speech +/- rehab once stable   - asa + high intensity statin   - echo w probable pfo/mri brain reviewed   - possible alvin to better eval for pfo + loop recorder for ? PAF    Acute hypoxic respiratory failure secondary to above.   - intubated 9/6-8   - rt/02 protocols   - IS/Pep/Mobilize    Chronic tobacco use.   - will  on cessation when stable    Hypokalemia   - I am replacing to keep >4    Hypomagnesemia   - I am replacing to keep > 2    Does not seek regular medical attention.         Discussed patient condition and risk of morbidity and/or mortality with Family, RN, RT, Therapies, Pharmacy and hospitalist.

## 2018-09-09 NOTE — PROGRESS NOTES
[facility] completed with JAMIA Sepulveda. Within limits of previous assessments.

## 2018-09-09 NOTE — PROGRESS NOTES
Cardiology Progress Note               Author: Coco Bateman Date & Time created: 2018  7:58 AM     Interval History:  Imaging reviewed, evolving R MCA infarct with evidence of hemorrhagic conversion.  BP up at times.  PACs and PVCs on tele, no afib  No family at bedside during my visit     Data reviewed by me personally:  Current medications  Tele- sinus, PVCs and PACs  ECG 18 sinus, 93, possible old inferior MI  Echo  18 EF 45%, possible R->L shunt  MRI brain 18 moderate size acute right MCA infarct  Cxray 18 pulm infiltrates  Dopplers 18 No obvious DVT  CTA neck, normal  MRI 18 evolving R MCA infarct with evidence of hemorrhagic conversion.     No FH of CVA, Afib    Chief Complaint:  CVA, PFO    Review of Systems   Constitutional: Negative for chills and fever.   Gastrointestinal: Negative for nausea and vomiting.       Physical Exam  General: No acute distress. Well nourished.  HEENT: EOM grossly intact, no scleral icterus, no pharyngeal erythema.  NG tube in place  Neck:  No JVD, no bruits, trachea midline  CVS: RRR some ectopy. Normal S1, S2. No M/R/G. No LE edema.  2+ radial pulses, 2+ PT pulses  Resp: Coarse breath sounds but no wheezing or rales normal respiratory effort.  Abdomen: Soft, NT, no jodi hepatomegaly, obese.  MSK/Ext: No clubbing or cyanosis.  Skin: Warm and dry, no rashes.  Neurological: CN III-XII grossly intact with the exception of dysarthria. Gross left upper extremity and lower extremity weakness  Psych: Sleeping, arousable, drifts off, answers some questions appropriately        Hemodynamics:  No data recorded.  Monitored Temp: 37.1 °C (98.78 °F)  Pulse  Av.6  Min: 61  Max: 98Heart Rate (Monitored): 73  NIBP: 151/88     Respiratory:    Respiration: (!) 9, Pulse Oximetry: 96 %     Work Of Breathing / Effort: Shallow  RUL Breath Sounds: Clear, RML Breath Sounds: Diminished, RLL Breath Sounds: Diminished, THAIS Breath Sounds: Clear, LLL Breath Sounds:  Diminished  Fluids:     Weight: 119 kg (262 lb 5.6 oz)  GI/Nutrition:  Orders Placed This Encounter   Procedures   • Diet NPO     Standing Status:   Standing     Number of Occurrences:   1     Order Specific Question:   Type:     Answer:   Now [1]     Order Specific Question:   Restrict to:     Answer:   Strict [1]     Lab Results:  Recent Labs      09/07/18   0534  09/08/18   0505  09/09/18   0512   WBC  9.9  9.3  9.2   RBC  4.56*  4.51*  4.91   HEMOGLOBIN  13.1*  13.0*  14.4   HEMATOCRIT  39.6*  43.0  43.2   MCV  86.8  95.3  88.0   MCH  28.7  28.8  29.3   MCHC  33.1*  30.2*  33.3*   RDW  44.6  51.8*  44.7   PLATELETCT  249  242  280   MPV  9.8  9.6  9.9     Recent Labs      09/07/18   0534  09/08/18   0505  09/09/18   0512   SODIUM  138  137  137   POTASSIUM  3.8  3.8  3.4*   CHLORIDE  112  111  107   CO2  20  20  22   GLUCOSE  105*  113*  92   BUN  12  18  19   CREATININE  0.75  0.72  0.67   CALCIUM  8.5  8.6  8.8     Recent Labs      09/06/18   0935   APTT  27.6   INR  1.02         Recent Labs      09/06/18   0935   TROPONINI  0.01     Recent Labs      09/06/18   0935  09/07/18   0534   TRIGLYCERIDE  335*  213*   HDL   --   27*   LDL   --   62         Medical Decision Making, by Problem:  Active Hospital Problems    Diagnosis   • *Stroke (HCC) [I63.9]   • Cerebral edema (HCC) [G93.6]   • Acute respiratory failure with hypoxia (HCC) [J96.01]   • Swelling of lower extremity [M79.89]   • Cardiomyopathy (HCC) [I42.9]       Plan:  1. Right MCA large territory CVA with hemorrhagic conversion  2. Probable PFO by TTE bubble study  3. PACs, can be a precursor for atrial fibrillation.  4. Tobacco use  5. Probably HTN     -Case discussed with Dr. Reg Monique and Dr. Sharma  -ASA, statin for now.  -30 monitor for afib then loop recorder if no afib on 30 day  -Consider MARIA M to assess ASD or PFO.  High risk features would be a PFO greater than 2 mm or on aneurysmal intra-atrial septum.  However, this is most c/w probably  afib.  -I would have a low threshold to initiate full anticoagulation after 14 days to cover him for atrial fibrillation given this type of stroke.  He did have hemorrhagic conversion so I defer the final decision to his neurologist.  -BP control as needed, any agent will do  -Consider outpt YUNI study     Quality-Core Measures   Reviewed items::  EKG reviewed, Labs reviewed, Medications reviewed and Radiology images reviewed

## 2018-09-09 NOTE — PROGRESS NOTES
Telephone report given to receiving RN into room S-192 bed 1. Receiving RN reports no questions or concerns at this time. Patient transferred via bed with ACLS RN, monitor, and with all valuables and belongings.

## 2018-09-09 NOTE — PROGRESS NOTES
2 RN skin check    Abrasion on 4th left toe  Healing abrasions to left hand  Dressing on right groin from thrombectomy   Red back

## 2018-09-09 NOTE — PROGRESS NOTES
Pt arrive to unit with no cortrak in place, per report pt removed. Speech paged several times with no return call. Cortrak placed.

## 2018-09-09 NOTE — CARE PLAN
Problem: Safety  Goal: Will remain free from injury  Outcome: PROGRESSING AS EXPECTED  Patient will remain free from injury. Patient educated on fall risk precaution, yellow band in place, bed alarm activated, and call light is in reach. Patient calls for assitance.     Problem: Pain  Goal: Alleviation of Pain or a reduction in pain to the patient's comfort goal    Intervention: Pain Management--Medications  Patients pain will be managed and appropriate PRN medications will be given when needed. Non pharmacologic pain management techniques used, such as relaxation, distraction, and rest.

## 2018-09-09 NOTE — PROGRESS NOTES
Cortrak Placement    Tube Team verified patient name and medical record number prior to tube placement.  Cortrak tube (55 inches, 12 [place]) placed at 60 cm in left nare.  Per Cortrak picture, tube appears to be in the stomach.  Nursing Instructions: Awaiting KUB to confirm placement before use for medications or feeding. Once placement confirmed, flush tube with 30 ml of water, and then remove and save stylet, in patient medication drawer.

## 2018-09-09 NOTE — PROGRESS NOTES
[facility] Hospitalist Progress Note    Date of Service: 2018    Chief Complaint  53 y.o. male admitted 2018 with acute stroke underwent right M1 thrombectomy    Interval Problem Update      Tolerated extubation  AOx3  NIH 4  Complains of headache  SR 70-80  -160  TF at goal  BM last evening   Afebrile   D/c mona          Consultants/Specialty  Neurology  Critical care    Disposition  ICU        Review of Systems   Constitutional: Negative for fever and malaise/fatigue.   HENT: Negative for congestion, ear discharge and nosebleeds.    Eyes: Negative for blurred vision, pain, discharge and redness.   Respiratory: Negative for cough, hemoptysis, sputum production, shortness of breath and stridor.    Cardiovascular: Negative for chest pain, palpitations, orthopnea and leg swelling.   Gastrointestinal: Negative for abdominal pain, blood in stool, diarrhea, melena, nausea and vomiting.   Genitourinary: Negative for dysuria, flank pain and hematuria.   Musculoskeletal: Negative for back pain, falls, joint pain and neck pain.   Skin: Negative.    Neurological: Positive for focal weakness, weakness and headaches. Negative for speech change, seizures and loss of consciousness.   Psychiatric/Behavioral: Negative for hallucinations, memory loss and substance abuse. The patient is not nervous/anxious.    All other systems reviewed and are negative.     Physical Exam  Laboratory/Imaging   Hemodynamics  No data recorded.   Monitored Temp: 37.1 °C (98.78 °F)  Pulse  Av.6  Min: 61  Max: 98 Heart Rate (Monitored): 73  NIBP: 151/88      Respiratory      Respiration: (!) 9, Pulse Oximetry: 96 %     Work Of Breathing / Effort: Shallow  RUL Breath Sounds: Clear, RML Breath Sounds: Diminished, RLL Breath Sounds: Diminished, THAIS Breath Sounds: Clear, LLL Breath Sounds: Diminished    Fluids    Intake/Output Summary (Last 24 hours) at 18 0731  Last data filed at 18 0600   Gross per 24 hour   Intake              2424 ml   Output             2100 ml   Net              324 ml       Nutrition  Orders Placed This Encounter   Procedures   • Diet NPO     Standing Status:   Standing     Number of Occurrences:   1     Order Specific Question:   Type:     Answer:   Now [1]     Order Specific Question:   Restrict to:     Answer:   Strict [1]     Physical Exam   Constitutional: He is oriented to person, place, and time. He appears well-developed and well-nourished.   HENT:   Head: Normocephalic and atraumatic.   Right Ear: External ear normal.   Left Ear: External ear normal.   Mouth/Throat: No oropharyngeal exudate.   cortrak  In place   Eyes: Conjunctivae are normal. Right eye exhibits no discharge. Left eye exhibits no discharge. No scleral icterus.   Neck: Neck supple. No JVD present. No tracheal deviation present.   Cardiovascular: Normal rate and regular rhythm.  Exam reveals no gallop and no friction rub.    No murmur heard.  Pulmonary/Chest: Effort normal and breath sounds normal. No stridor. No respiratory distress. He has no wheezes. He has no rales. He exhibits no tenderness.   Abdominal: Soft. Bowel sounds are normal. He exhibits no distension and no mass. There is no tenderness. There is no rebound and no guarding.   Musculoskeletal: He exhibits no edema or tenderness.   Neurological: He is alert and oriented to person, place, and time. A cranial nerve deficit is present. He exhibits abnormal muscle tone.   Left hemiparesis4/5 LUE 3/5    Skin: Skin is warm and dry. He is not diaphoretic. No cyanosis or erythema. Nails show no clubbing.   Psychiatric: He has a normal mood and affect. His behavior is normal.   Nursing note and vitals reviewed.      Recent Labs      09/07/18   0534  09/08/18   0505  09/09/18   0512   WBC  9.9  9.3  9.2   RBC  4.56*  4.51*  4.91   HEMOGLOBIN  13.1*  13.0*  14.4   HEMATOCRIT  39.6*  43.0  43.2   MCV  86.8  95.3  88.0   MCH  28.7  28.8  29.3   MCHC  33.1*  30.2*  33.3*   RDW  44.6  51.8*  44.7    PLATELETCT  249  242  280   MPV  9.8  9.6  9.9     Recent Labs      09/07/18   0534  09/08/18   0505  09/09/18   0512   SODIUM  138  137  137   POTASSIUM  3.8  3.8  3.4*   CHLORIDE  112  111  107   CO2  20  20  22   GLUCOSE  105*  113*  92   BUN  12  18  19   CREATININE  0.75  0.72  0.67   CALCIUM  8.5  8.6  8.8     Recent Labs      09/06/18   0935   APTT  27.6   INR  1.02         Recent Labs      09/06/18   0935  09/07/18   0534   TRIGLYCERIDE  335*  213*   HDL   --   27*   LDL   --   62          Assessment/Plan     * Stroke (HCC)- (present on admission)   Assessment & Plan    Patient appears to have a large right-sided stroke    Status post right M1 thrombectomy  Continue aspirin and statin  PT/OT/SLP        Cerebral edema (HCC)- (present on admission)   Assessment & Plan    Discussed with Dr. Garcia  Continue clinical monitoring with every 4 hours neuro checks        Acute respiratory failure with hypoxia (HCC)- (present on admission)   Assessment & Plan    Intubated on 9/6/2018   extubated 9/8/2018    Aspiration precautions   RT protocol           Hypokalemia   Assessment & Plan    Replete and monitor         Cardiomyopathy (HCC)   Assessment & Plan    Appreciate cardiology assistance  On low-dose aspirin  Continue statin  Started on carvedilol  Plan is for anticoagulation 2 weeks post stroke if remains stable        Swelling of lower extremity- (present on admission)   Assessment & Plan    Lower extremity duplex negative for DVT          Quality-Core Measures   Reviewed items::  Labs reviewed, Medications reviewed and Radiology images reviewed  DVT prophylaxis pharmacological::  Contraindicated - High bleeding risk  Ulcer Prophylaxis::  Yes      Plan of care reviewed with patient and discussed with nursing staff critical care and neurosurgery

## 2018-09-09 NOTE — CONSULTS
DATE OF SERVICE:  09/08/2018    NEUROSURGICAL CONSULTATION    CHIEF COMPLAINT:  Right MCA stroke.    HISTORY OF PRESENT ILLNESS:  This is a 53-year-old gentleman who apparently   has not sought routine medical care, came in with a left hemiparesis and a   right MCA stroke.  He underwent a thrombectomy on the date of admission by Dr. Jose Francisco Mcnamara.  He remained intubated, and supported until today.  He had an   MRI examination, which showed some evolvement of his right MCA infarct and   there was some minimal hemorrhagic conversion, which appear stable on CT.    He appears to be improving, but medicine wanted to make sure neurosurgery was   involved.    PAST MEDICAL HISTORY:  Remarkable for possible congestive heart failure versus   peripheral edema in the lower extremities for other causes.  May be history   of hypertension and obviously cerebrovascular disease.    FAMILY HISTORY:  None on file.    SOCIAL HISTORY:  Patient was accompanied by his son and family basically gave   the history that we received into the chart.    SOCIAL HISTORY:  Smokes a pack of cigarettes a day.  Drinks alcohol rarely.    Works in construction.    ALLERGIES:  None known.    MEDICATIONS:  None.    PHYSICAL EXAMINATION:  NEUROLOGIC:  The patient is awake.  He is able to state his name.  He has   whispery voice.  EXTREMITIES:  Right arm and right leg move purposely, and to command.  Left   side, he is flaccid in the left upper extremity, but has antigravity on the   left lower and he does follow here.    His sensory examination appears to be diminished on the left.    RADIOGRAPHIC REVIEW:  He has a CT and MRI to show a right MCA infarct.  This   does not have a lot of mass effect and shift and shift is about 6 mm.  He has   some ventricular effacement.    Overall, I think that he is going to get by without any intervention, but   certainly it is good for us to follow him along in case he gets significant   swelling, edema and requires a  craniectomy.       ____________________________________     MD MALDONADO CULLEN / BARBARA    DD:  09/08/2018 16:54:20  DT:  09/08/2018 17:13:25    D#:  [id number]  Job#:  [id number]

## 2018-09-09 NOTE — PROGRESS NOTES
Neurosurgery Progress Note    Subjective:  Patient has been stable overnight.   He has been more alert and oriented.   Speech has improved    Exam:  Alert  Oriented  Follows commands without deficit on the left.   On the right, arm is 2-3/5 proximally with minimal dexterity in the hand.  Right leg 2/5 in IS, Quad and DF, Pf at 4-/5    Pulse  Av.7  Min: 63  Max: 94  Resp  Av.2  Min: 7  Max: 27  Monitored Temp 2  Av.3 °C (99.1 °F)  Min: 37 °C (98.6 °F)  Max: 37.9 °C (100.2 °F)  SpO2  Av.9 %  Min: 92 %  Max: 97 %    No Data Recorded    Recent Labs      18   0534  18   0505  18   0512   WBC  9.9  9.3  9.2   RBC  4.56*  4.51*  4.91   HEMOGLOBIN  13.1*  13.0*  14.4   HEMATOCRIT  39.6*  43.0  43.2   MCV  86.8  95.3  88.0   MCH  28.7  28.8  29.3   MCHC  33.1*  30.2*  33.3*   RDW  44.6  51.8*  44.7   PLATELETCT  249  242  280   MPV  9.8  9.6  9.9     Recent Labs      18   0534  18   0505  18   0512   SODIUM  138  137  137   POTASSIUM  3.8  3.8  3.4*   CHLORIDE  112  111  107   CO2  20  20  22   GLUCOSE  105*  113*  92   BUN  12  18  19   CREATININE  0.75  0.72  0.67   CALCIUM  8.5  8.6  8.8     Recent Labs      18   0935   APTT  27.6   INR  1.02           Intake/Output       18 07 - 18 0659 18 07 - 09/10/18 0659       Total [id number] Total       Intake    I.V.  614  20 634  --  -- --    Precedex Volume 114 -- 114 -- -- --    IV Piggyback Volume (IV Piggyback) 250 -- 250 -- -- --    IV Volume (TKO) 250 20 270 -- -- --    Other  60  60 120  --  -- --    Medications (P.O./ Enteral Liquids) 60 60 120 -- -- --    Enteral  770  900 1670  --  -- --    Free Water / Tube Flush 90 180 270 -- -- --    Intake (mL) (Enteral Tube 10 fr Left Nare Cortrak Gastric Feeding Tube)  -- -- --    Total Intake 2496 016 2754 -- -- --       Output    Urine  855  1245 2100  --  -- --    Output (mL) (Urinary Catheter Indwelling  Catheter 16 fr) 562 8324 2100 -- -- --    Stool  --  -- --  --  -- --    Number of Times Stooled 0 x 1 x 1 x -- -- --    Total Output 855 1241 2100 -- -- --       Net I/O     589 -151 324 -- -- --            Intake/Output Summary (Last 24 hours) at 09/09/18 0905  Last data filed at 09/09/18 0600   Gross per 24 hour   Intake           2083.2 ml   Output             1950 ml   Net            133.2 ml            • potassium phosphate ivpb  30 mmol Once   • magnesium sulfate  2 g Once   • carvedilol  3.125 mg BID WITH MEALS   • oxyCODONE immediate-release  10 mg Q4HRS PRN    Or   • oxyCODONE immediate-release  5 mg Q4HRS PRN   • aspirin  81 mg DAILY   • lidocaine  2 mL Q HOUR PRN   • labetalol  10 mg Q4HRS PRN    Or   • hydrALAZINE  10 mg Q2HRS PRN   • atorvastatin  80 mg Q EVENING   • senna-docusate  2 Tab BID    And   • polyethylene glycol/lytes  1 Packet QDAY PRN    And   • magnesium hydroxide  30 mL QDAY PRN    And   • bisacodyl  10 mg QDAY PRN   • ondansetron  4 mg Q4HRS PRN   • promethazine  12.5-25 mg Q4HRS PRN   • promethazine  12.5-25 mg Q4HRS PRN   • prochlorperazine  5-10 mg Q4HRS PRN   • acetaminophen  650 mg Q6HRS PRN   • Respiratory Care per Protocol   Continuous RT   • MD Alert...Adult ICU Electrolyte Replacement per Pharmacy   pharmacy to dose   • ipratropium-albuterol  3 mL Q2HRS PRN (RT)   • insulin regular  2-9 Units Q6HRS    And   • dextrose 50%  25 mL Q15 MIN PRN       Assessment and Plan:  Hospital day #5  POD #0  Prophylactic anticoagulation:no        Start date/time: TBD    Stable post right mca infarct and embolectomy.   Patient ok to the floor.   Please call if he deteriorates.  Neurosurgery to sign off.

## 2018-09-09 NOTE — CARE PLAN
Problem: Safety  Goal: Will remain free from injury  Outcome: PROGRESSING AS EXPECTED  Safety check done with night shift RN on equipment in use, bed alarm is on, call light within reach, bed is in lowest and locked position.     Problem: Pain  Goal: Alleviation of Pain or a reduction in pain to the patient's comfort goal  Outcome: PROGRESSING AS EXPECTED  Appropriate PRN medications on MAR. Patient educated on non-pharmacological interventions.

## 2018-09-09 NOTE — PROGRESS NOTES
Pt tells RN that he is going to stand up with or without RN permission.  RN educates Pt on his left sided weakness and the importance of safe mobilization.  Pt begins to put legs over side rail.  RN provides more education and recruits the assistance of three other RNs to mobilize to bedside commode.

## 2018-09-10 ENCOUNTER — APPOINTMENT (OUTPATIENT)
Dept: RADIOLOGY | Facility: MEDICAL CENTER | Age: 53
DRG: 023 | End: 2018-09-10
Attending: INTERNAL MEDICINE
Payer: COMMERCIAL

## 2018-09-10 ENCOUNTER — APPOINTMENT (OUTPATIENT)
Dept: RADIOLOGY | Facility: MEDICAL CENTER | Age: 53
DRG: 023 | End: 2018-09-10
Attending: HOSPITALIST
Payer: COMMERCIAL

## 2018-09-10 PROBLEM — R13.10 DYSPHAGIA: Status: ACTIVE | Noted: 2018-09-10

## 2018-09-10 LAB
ALBUMIN SERPL BCP-MCNC: 3.8 G/DL (ref 3.2–4.9)
ALBUMIN/GLOB SERPL: 1.3 G/DL
ALP SERPL-CCNC: 65 U/L (ref 30–99)
ALT SERPL-CCNC: 19 U/L (ref 2–50)
ANION GAP SERPL CALC-SCNC: 9 MMOL/L (ref 0–11.9)
AST SERPL-CCNC: 20 U/L (ref 12–45)
BASOPHILS # BLD AUTO: 1.1 % (ref 0–1.8)
BASOPHILS # BLD: 0.08 K/UL (ref 0–0.12)
BILIRUB SERPL-MCNC: 0.7 MG/DL (ref 0.1–1.5)
BUN SERPL-MCNC: 18 MG/DL (ref 8–22)
CALCIUM SERPL-MCNC: 9.5 MG/DL (ref 8.5–10.5)
CHLORIDE SERPL-SCNC: 105 MMOL/L (ref 96–112)
CO2 SERPL-SCNC: 26 MMOL/L (ref 20–33)
CREAT SERPL-MCNC: 0.76 MG/DL (ref 0.5–1.4)
EOSINOPHIL # BLD AUTO: 0.39 K/UL (ref 0–0.51)
EOSINOPHIL NFR BLD: 5.2 % (ref 0–6.9)
ERYTHROCYTE [DISTWIDTH] IN BLOOD BY AUTOMATED COUNT: 42.3 FL (ref 35.9–50)
GLOBULIN SER CALC-MCNC: 3 G/DL (ref 1.9–3.5)
GLUCOSE BLD-MCNC: 109 MG/DL (ref 65–99)
GLUCOSE BLD-MCNC: 110 MG/DL (ref 65–99)
GLUCOSE BLD-MCNC: 96 MG/DL (ref 65–99)
GLUCOSE SERPL-MCNC: 95 MG/DL (ref 65–99)
HCT VFR BLD AUTO: 43.4 % (ref 42–52)
HGB BLD-MCNC: 14.8 G/DL (ref 14–18)
IMM GRANULOCYTES # BLD AUTO: 0.05 K/UL (ref 0–0.11)
IMM GRANULOCYTES NFR BLD AUTO: 0.7 % (ref 0–0.9)
LYMPHOCYTES # BLD AUTO: 0.8 K/UL (ref 1–4.8)
LYMPHOCYTES NFR BLD: 10.6 % (ref 22–41)
MAGNESIUM SERPL-MCNC: 2 MG/DL (ref 1.5–2.5)
MCH RBC QN AUTO: 29.6 PG (ref 27–33)
MCHC RBC AUTO-ENTMCNC: 34.1 G/DL (ref 33.7–35.3)
MCV RBC AUTO: 86.8 FL (ref 81.4–97.8)
MONOCYTES # BLD AUTO: 0.84 K/UL (ref 0–0.85)
MONOCYTES NFR BLD AUTO: 11.1 % (ref 0–13.4)
NEUTROPHILS # BLD AUTO: 5.41 K/UL (ref 1.82–7.42)
NEUTROPHILS NFR BLD: 71.3 % (ref 44–72)
NRBC # BLD AUTO: 0 K/UL
NRBC BLD-RTO: 0 /100 WBC
PHOSPHATE SERPL-MCNC: 3.1 MG/DL (ref 2.5–4.5)
PLATELET # BLD AUTO: 300 K/UL (ref 164–446)
PMV BLD AUTO: 9.6 FL (ref 9–12.9)
POTASSIUM SERPL-SCNC: 3.6 MMOL/L (ref 3.6–5.5)
PROT SERPL-MCNC: 6.8 G/DL (ref 6–8.2)
RBC # BLD AUTO: 5 M/UL (ref 4.7–6.1)
SODIUM SERPL-SCNC: 140 MMOL/L (ref 135–145)
WBC # BLD AUTO: 7.6 K/UL (ref 4.8–10.8)

## 2018-09-10 PROCEDURE — 97162 PT EVAL MOD COMPLEX 30 MIN: CPT

## 2018-09-10 PROCEDURE — 85025 COMPLETE CBC W/AUTO DIFF WBC: CPT

## 2018-09-10 PROCEDURE — 74018 RADEX ABDOMEN 1 VIEW: CPT

## 2018-09-10 PROCEDURE — 33282 HCHG CARDIAC LR INSERTION: CPT

## 2018-09-10 PROCEDURE — 80053 COMPREHEN METABOLIC PANEL: CPT

## 2018-09-10 PROCEDURE — G8997 SWALLOW GOAL STATUS: HCPCS | Mod: CH

## 2018-09-10 PROCEDURE — 700102 HCHG RX REV CODE 250 W/ 637 OVERRIDE(OP): Performed by: PSYCHIATRY & NEUROLOGY

## 2018-09-10 PROCEDURE — 700102 HCHG RX REV CODE 250 W/ 637 OVERRIDE(OP): Performed by: HOSPITALIST

## 2018-09-10 PROCEDURE — 0JH602Z INSERTION OF MONITORING DEVICE INTO CHEST SUBCUTANEOUS TISSUE AND FASCIA, OPEN APPROACH: ICD-10-PCS | Performed by: INTERNAL MEDICINE

## 2018-09-10 PROCEDURE — A9270 NON-COVERED ITEM OR SERVICE: HCPCS | Performed by: INTERNAL MEDICINE

## 2018-09-10 PROCEDURE — 700101 HCHG RX REV CODE 250

## 2018-09-10 PROCEDURE — 83735 ASSAY OF MAGNESIUM: CPT

## 2018-09-10 PROCEDURE — A9270 NON-COVERED ITEM OR SERVICE: HCPCS | Performed by: HOSPITALIST

## 2018-09-10 PROCEDURE — A9270 NON-COVERED ITEM OR SERVICE: HCPCS | Performed by: PSYCHIATRY & NEUROLOGY

## 2018-09-10 PROCEDURE — 99232 SBSQ HOSP IP/OBS MODERATE 35: CPT | Performed by: HOSPITALIST

## 2018-09-10 PROCEDURE — 92610 EVALUATE SWALLOWING FUNCTION: CPT

## 2018-09-10 PROCEDURE — 302736 HCHG ADHESIVE DERMABOND

## 2018-09-10 PROCEDURE — 82962 GLUCOSE BLOOD TEST: CPT

## 2018-09-10 PROCEDURE — 71045 X-RAY EXAM CHEST 1 VIEW: CPT

## 2018-09-10 PROCEDURE — G8987 SELF CARE CURRENT STATUS: HCPCS | Mod: CK

## 2018-09-10 PROCEDURE — 770020 HCHG ROOM/CARE - TELE (206)

## 2018-09-10 PROCEDURE — 700102 HCHG RX REV CODE 250 W/ 637 OVERRIDE(OP): Performed by: INTERNAL MEDICINE

## 2018-09-10 PROCEDURE — G8979 MOBILITY GOAL STATUS: HCPCS | Mod: CI

## 2018-09-10 PROCEDURE — 97166 OT EVAL MOD COMPLEX 45 MIN: CPT

## 2018-09-10 PROCEDURE — C1764 EVENT RECORDER, CARDIAC: HCPCS

## 2018-09-10 PROCEDURE — G8988 SELF CARE GOAL STATUS: HCPCS | Mod: CI

## 2018-09-10 PROCEDURE — 36415 COLL VENOUS BLD VENIPUNCTURE: CPT

## 2018-09-10 PROCEDURE — 84100 ASSAY OF PHOSPHORUS: CPT

## 2018-09-10 PROCEDURE — G8996 SWALLOW CURRENT STATUS: HCPCS | Mod: CL

## 2018-09-10 PROCEDURE — G8978 MOBILITY CURRENT STATUS: HCPCS | Mod: CL

## 2018-09-10 RX ORDER — LIDOCAINE HYDROCHLORIDE AND EPINEPHRINE BITARTRATE 20; .01 MG/ML; MG/ML
INJECTION, SOLUTION SUBCUTANEOUS
Status: COMPLETED
Start: 2018-09-10 | End: 2018-09-10

## 2018-09-10 RX ADMIN — ACETAMINOPHEN 650 MG: 325 TABLET, FILM COATED ORAL at 04:10

## 2018-09-10 RX ADMIN — SENNOSIDES AND DOCUSATE SODIUM 2 TABLET: 8.6; 5 TABLET ORAL at 17:12

## 2018-09-10 RX ADMIN — OXYCODONE HYDROCHLORIDE 10 MG: 10 TABLET ORAL at 19:38

## 2018-09-10 RX ADMIN — ATORVASTATIN CALCIUM 80 MG: 80 TABLET, FILM COATED ORAL at 17:12

## 2018-09-10 RX ADMIN — SENNOSIDES AND DOCUSATE SODIUM 2 TABLET: 8.6; 5 TABLET ORAL at 05:30

## 2018-09-10 RX ADMIN — CARVEDILOL 3.12 MG: 6.25 TABLET, FILM COATED ORAL at 17:11

## 2018-09-10 RX ADMIN — LIDOCAINE HYDROCHLORIDE AND EPINEPHRINE: 20; 10 INJECTION, SOLUTION INFILTRATION; PERINEURAL at 11:45

## 2018-09-10 RX ADMIN — ASPIRIN 81 MG: 81 TABLET, CHEWABLE ORAL at 05:30

## 2018-09-10 RX ADMIN — CARVEDILOL 3.12 MG: 6.25 TABLET, FILM COATED ORAL at 09:12

## 2018-09-10 RX ADMIN — OXYCODONE HYDROCHLORIDE 10 MG: 10 TABLET ORAL at 10:41

## 2018-09-10 RX ADMIN — OXYCODONE HYDROCHLORIDE 10 MG: 10 TABLET ORAL at 23:13

## 2018-09-10 RX ADMIN — OXYCODONE HYDROCHLORIDE 10 MG: 10 TABLET ORAL at 15:08

## 2018-09-10 RX ADMIN — ACETAMINOPHEN 650 MG: 325 TABLET, FILM COATED ORAL at 17:12

## 2018-09-10 ASSESSMENT — ENCOUNTER SYMPTOMS
CHILLS: 0
EYES NEGATIVE: 1
ABDOMINAL PAIN: 0
PSYCHIATRIC NEGATIVE: 1
FEVER: 0
CONSTITUTIONAL NEGATIVE: 1
HEADACHES: 1
SENSORY CHANGE: 1
BLURRED VISION: 0
RESPIRATORY NEGATIVE: 1
FOCAL WEAKNESS: 0
SHORTNESS OF BREATH: 0
ROS GI COMMENTS: DYSPHAGIA
DEPRESSION: 0
PALPITATIONS: 0
DIZZINESS: 0
MYALGIAS: 0
WEAKNESS: 0
COUGH: 0
SPEECH CHANGE: 1
SORE THROAT: 0
VOMITING: 0
CARDIOVASCULAR NEGATIVE: 1
NAUSEA: 0
WEIGHT LOSS: 0
GASTROINTESTINAL NEGATIVE: 1
MUSCULOSKELETAL NEGATIVE: 1
DIAPHORESIS: 0
BRUISES/BLEEDS EASILY: 0

## 2018-09-10 ASSESSMENT — PAIN SCALES - GENERAL
PAINLEVEL_OUTOF10: 10
PAINLEVEL_OUTOF10: 7
PAINLEVEL_OUTOF10: 8
PAINLEVEL_OUTOF10: 4
PAINLEVEL_OUTOF10: 5
PAINLEVEL_OUTOF10: 8
PAINLEVEL_OUTOF10: 4
PAINLEVEL_OUTOF10: 7
PAINLEVEL_OUTOF10: 10
PAINLEVEL_OUTOF10: 8
PAINLEVEL_OUTOF10: 5

## 2018-09-10 ASSESSMENT — GAIT ASSESSMENTS
ASSISTIVE DEVICE: FRONT WHEEL WALKER
GAIT LEVEL OF ASSIST: MINIMAL ASSIST

## 2018-09-10 ASSESSMENT — COGNITIVE AND FUNCTIONAL STATUS - GENERAL
STANDING UP FROM CHAIR USING ARMS: A LITTLE
TURNING FROM BACK TO SIDE WHILE IN FLAT BAD: UNABLE
CLIMB 3 TO 5 STEPS WITH RAILING: TOTAL
TOILETING: A LOT
DRESSING REGULAR LOWER BODY CLOTHING: A LOT
DAILY ACTIVITIY SCORE: 14
MOVING TO AND FROM BED TO CHAIR: UNABLE
DRESSING REGULAR UPPER BODY CLOTHING: A LOT
PERSONAL GROOMING: A LOT
HELP NEEDED FOR BATHING: A LOT
SUGGESTED CMS G CODE MODIFIER MOBILITY: CM
MOBILITY SCORE: 9
WALKING IN HOSPITAL ROOM: A LOT
MOVING FROM LYING ON BACK TO SITTING ON SIDE OF FLAT BED: UNABLE
SUGGESTED CMS G CODE MODIFIER DAILY ACTIVITY: CK

## 2018-09-10 ASSESSMENT — ACTIVITIES OF DAILY LIVING (ADL): TOILETING: INDEPENDENT

## 2018-09-10 ASSESSMENT — LIFESTYLE VARIABLES: SUBSTANCE_ABUSE: 0

## 2018-09-10 NOTE — PROGRESS NOTES
Assumed pt care at 0700 and received bedside report.    Pt alert and oriented X 3, disoriented to event. Pt denies chest pain, sob, nausea and vomiting, and blurry or double vision. Pt  C/o of numbness to left leg, states this is normal since hospitalization. Pt c/o of headache, provided non pharmacological interventions due to pt pulling cortrak this am and awaiting x ray confirmation. Q2 hour turns in place, will place waffle cushion in bed today. Pt irritable and non compliant. Education provided. POC discussed and education provided on administered medications. All questions and concerns addressed. Fall precautions, hourly rounding and Q4 hour neuro checks in place.     1015: Tele called to notify that pt had four 1.7 second pauses. Dr Whittington notified. Will keep pt on tele per Dr. Whittington.     1700: 2 RN's attempted to place bridal, unsuccessful.

## 2018-09-10 NOTE — THERAPY
"Occupational Therapy Evaluation completed.   Functional Status:  Mod A supine>sit EOB, max A Lb dressing, max A UB dressing, mod A w/oral care seated EOB. L neglect w/L-side weakness and impaired coordination and motor control. C/o h/a and low back pain during assessment, did appear fatigued required max A w/sit>Stand and max A txf to chair. Remained up in chair w/family present, alarm on and call light w/in reach   Plan of Care: Will benefit from Occupational Therapy 5 times per week  Discharge Recommendations:  Equipment: Will Continue to Assess for Equipment Needs. Post-acute therapy Discharge to a transitional care facility for continued skilled therapy services.    See \"Rehab Therapy-Acute\" Patient Summary Report for complete documentation.      53 yr old male admitted for stroke, pt was camping and then was found down w/left facial drop and left sided weakness. Pt required intubation, dx w/large R side MCA, has since been extubated and is s/p thrombectomy. Pts previous medical hx is unknown. Pt is currently demonstrating impaired cognition, safety awareness, pain, L-side weakness/coordination, impaired postural control and functional mobility all impacting pts ability to complete basic ADL's and txfs. Pt will benefit from acute OT and currently recommend post acute placement prior to d/c home   "

## 2018-09-10 NOTE — PROGRESS NOTES
@1651 found pt with his Cortrak, tele monitor leads and condom catheter pulled out. Reinserted Cortrak by JAMIA Paige

## 2018-09-10 NOTE — CONSULTS
Medical chart review completed. Patient is a 53 y.o. year-old male admitted on 9/6 as a transfer from outside hospital with left sided weakness. Negative Head CT. He did not receive tPA as he was outside the time window. Required intubation for decreased level of consciousness, transferred here for further management. CTA here with thrombus in the right M1 and right sided cerebral edema. He underwent successful thrombectomy of the M1 Segment on 9/6, though with persistent occlusion of a distal small posterior M2 branch. MRI with moderate right MCA infarction with hemorrhagic conversion, right to left midline shift. ECHO with hypokinetic septum, EF 45%, with right to left shunt present. LE dopplers negative for DVTs. He had a loop recorder place by Dr. Olsen today. He was extubated 9/8.    Patient with multiple co-morbidities(including but not limited to hypertension, dysphagia, hypertriglyceridemia); with cognitive deficits and functional deficits in mobility/self-care/swallowing/speech, and Severe de-conditioning. Pre-morbidly, this patient lived in a single level home with Two  steps to enter, with his cousins. The patient was evaluated by acute care PT and OR; currently requiring moderate assistance for mobility and moderate to maximum assistance for ADLs, also with ongoing cognitive deficits.     The patient is an excellent candidate for an acute inpatient rehabilitation program with a coordinated program of care at an intensity and frequency not available at a lower level of care.     Please determine if the patient has anyone to assist him at home, as right sided strokes often have cognitive deficits and he may require some supervision at discharge.     This recommendation is substantiated by the patient's current medical condition with intervention and assessment of medical issues requiring an acute level of care for patient's safety and maximum outcome. A coordinated program of care will be provided by an  interdisciplinary team including physical therapy, occupational therapy, speech language pathology, physiatry, rehab nursing and rehab psychology. Rehab goals include improved cognition, mobility, self-care management, strength and conditioning/endurance, pain management, bowel and bladder management, mood and affect, and safety with independent home management including caregiver training. Estimated length of stay is approximately 21-28 days. Rehab potential: Good. Disposition: to pre-morbid independent living setting with supportive care of patient's family. We will continue to follow with you in anticipation of discharge to acute inpatient rehabilitation when medically stable to do so at the discretion of his  attending physician. Thank you for allowing us to participate in his care. Please call with any questions regarding this recommendation.    Naomi Taylor M.D.  Physical Medicine and Rehabilitation

## 2018-09-10 NOTE — DISCHARGE PLANNING
Anticipated Discharge Disposition: TBD    Action: LSW met with patient and son at bedside. Per patient's son, patient works for [employer].  The company switched insurance plans/providers and patient is awaiting a new Insurance Card Number/ Group ID.  LSW informed PFA and provided PFA with patient's HR contact: Vannessa Ellsworthis at [phone].    Barriers to Discharge: Medical Clearance    Plan: LSW to continue to assist with d/c as needed.

## 2018-09-10 NOTE — RESPIRATORY CARE
COPD EDUCATION by COPD CLINICAL EDUCATOR  9/10/2018 at 7:55 AM by Deana Suarez     Patient reviewed by COPD education team. Patient does not qualify for COPD program.

## 2018-09-10 NOTE — DISCHARGE PLANNING
Aware of repeat PMR referral from Dr. Jennifer Whittington. Physiatry consult ordered. Awaiting recommendation.

## 2018-09-10 NOTE — PROGRESS NOTES
Hospital Medicine Daily Progress Note    Date of Service  9/10/2018    Chief Complaint  53 y.o. male admitted 9/6/2018 with acute left sided weakness due to an acute mca stroke with hemorrhagic conversion.        Hospital Course  He did require intubation but has now been successfully extubated.  He also underwent a partial thrombectomy.    Interval Problem Update  He has ongoing dysphagia for which speech is following, - a FEES is planned for tomorrow.  He had a loop recorder placed today and full anticoagulation has been recommended to begin in two weeks.    Consultants/Specialty  PMR  Neurology  Cardiology  Critical Care    Disposition  Acute rehab penidng    Review of Systems  Review of Systems   Constitutional: Negative.  Negative for chills, diaphoresis, fever, malaise/fatigue and weight loss.   HENT: Negative.  Negative for sore throat.    Eyes: Negative.  Negative for blurred vision.   Respiratory: Negative.  Negative for cough and shortness of breath.    Cardiovascular: Negative.  Negative for chest pain, palpitations and leg swelling.   Gastrointestinal: Negative.  Negative for abdominal pain, nausea and vomiting.        Dysphagia   Genitourinary: Negative.  Negative for dysuria.   Musculoskeletal: Negative.  Negative for myalgias.   Skin: Negative.  Negative for itching and rash.   Neurological: Positive for sensory change, speech change and headaches. Negative for dizziness, focal weakness and weakness.   Endo/Heme/Allergies: Negative.  Does not bruise/bleed easily.   Psychiatric/Behavioral: Negative.  Negative for depression, substance abuse and suicidal ideas.   All other systems reviewed and are negative.       Physical Exam  Blood Pressure: 158/82   Temperature: 36.8 °C (98.3 °F)   Pulse: 72   Respiration: 15   Pulse Oximetry: 99 %     Physical Exam   Constitutional: He is oriented to person, place, and time. He appears well-developed and well-nourished. No distress.   HENT:   Head: Normocephalic and  atraumatic.   Mouth/Throat: Oropharynx is clear and moist.   cortrack in place   Eyes: Conjunctivae are normal. Right eye exhibits no discharge.   Cardiovascular: Normal rate, regular rhythm, normal heart sounds and intact distal pulses.  Exam reveals no gallop and no friction rub.    No murmur heard.  Pulmonary/Chest: Effort normal and breath sounds normal. No respiratory distress. He has no wheezes. He has no rales. He exhibits no tenderness.   Abdominal: Soft. Bowel sounds are normal. He exhibits no distension and no mass. There is no tenderness. There is no rebound and no guarding.   Musculoskeletal: Normal range of motion. He exhibits no edema, tenderness or deformity.   Neurological: He is alert and oriented to person, place, and time. He has normal reflexes. No cranial nerve deficit. He exhibits normal muscle tone. Coordination normal.   Mild l weakness   Skin: Skin is warm and dry. No rash noted. He is not diaphoretic. No erythema. No pallor.   Psychiatric: He has a normal mood and affect. His behavior is normal. Judgment and thought content normal.   Nursing note and vitals reviewed.      Fluids    Intake/Output Summary (Last 24 hours) at 09/10/18 1413  Last data filed at 09/10/18 1200   Gross per 24 hour   Intake              450 ml   Output             1400 ml   Net             -950 ml       Laboratory  Recent Labs      09/08/18   0505  09/09/18   0512  09/10/18   0251   WBC  9.3  9.2  7.6   RBC  4.51*  4.91  5.00   HEMOGLOBIN  13.0*  14.4  14.8   HEMATOCRIT  43.0  43.2  43.4   MCV  95.3  88.0  86.8   MCH  28.8  29.3  29.6   MCHC  30.2*  33.3*  34.1   RDW  51.8*  44.7  42.3   PLATELETCT  242  280  300   MPV  9.6  9.9  9.6     Recent Labs      09/08/18   0505  09/09/18   0512  09/10/18   0251   SODIUM  137  137  140   POTASSIUM  3.8  3.4*  3.6   CHLORIDE  111  107  105   CO2  20  22  26   GLUCOSE  113*  92  95   BUN  18  19  18   CREATININE  0.72  0.67  0.76   CALCIUM  8.6  8.8  9.5                    Imaging  DX-CHEST-PORTABLE (1 VIEW)   Final Result      Unchanged bibasilar atelectasis and/or airspace disease      EB-FQUGABI-7 VIEW   Final Result      Enteric tube tip projects over the gastric antrum                  DX-ABDOMEN FOR TUBE PLACEMENT   Final Result         Feeding tube with tip projecting over the expected area of the stomach fundus.      DX-CHEST-PORTABLE (1 VIEW)   Final Result         1.  Pulmonary edema and/or infiltrates.      CT-HEAD W/O   Final Result         1. Evolving right MCA territory infarct with slight interval increase in local mass effect from local edema. Effacement of the right frontal horn. No hydrocephalus.   2. Stable hemorrhagic products within the infarct. No progressive parenchymal hemorrhage.      MR-BRAIN-W/O   Final Result      1.  Moderate size acute right MCA territory infarct with evidence of hemorrhagic conversion.   2.  6 mm of right-to-left midline shift with effacement of the right lateral and third ventricles. No evidence of hydrocephalus.   3.  Findings of sinusitis as described above.   4.  Trace bilateral mastoid effusions. Findings could be consistent with mastoiditis in the appropriate clinical setting.      Attempts to convey these findings to Dr. PASTOR CLAYTON were initiated on 9/8/2018 2:03 PM. These findings were discussed with PASTOR CLAYTON on 9/8/2018 2:21 PM.      DX-CHEST-PORTABLE (1 VIEW)   Final Result         1.  Pulmonary edema and/or infiltrates are identified, which are stable since the prior exam.      ECHOCARDIOGRAM-COMP W/ CONT   Final Result      DX-ABDOMEN FOR TUBE PLACEMENT   Final Result      Enteric tube projects over the distal stomach/proximal duodenum.         DX-CHEST-PORTABLE (1 VIEW)   Final Result         1.  Mild pulmonary edema and/or infiltrates.      LE VENOUS DUPLEX   Final Result      IR-THROMBECTOMY ARTERY SECONDARY   Final Result      1. Subtotal occlusion of the right M1 segment.      2.  Successful cerebral  thrombectomy performed with post intervention   angiogram demonstrating  patent patent right M1 segment and M2 and M3 branches, with a persistent occlusion of a distal small posterior division M2 branch            CT-CTA HEAD WITH & W/O-POST PROCESS   Final Result      Findings consistent with thrombus within the distal right M1 segment with attenuation of flow within the right sylvian artery branches.   Remainder of the circulation appears patent.   Right cerebral edema. No acute hemorrhage.      CT-CTA NECK WITH & W/O-POST PROCESSING   Final Result      CT angiogram of the neck within normal limits.      CT-HEAD W/O   Final Result      Suspect right cerebral edema with mild right-sided mass effect and 3 mm right to left midline shift.   No acute intracranial hemorrhage.   No hydrocephalus.      CT-CEREBRAL PERFUSION ANALYSIS   Final Result      1.  CT perfusion examination over the limited section of brain reveals 11 mL of brain parenchyma has less than 30% of cerebral blood flow (CBF).      2.  Please note that the cerebral perfusion was performed on the limited brain tissue around the basal ganglia region. Infarct/ischemia outside the CT perfusion sections can be missed in this study.      DX-CHEST-PORTABLE (1 VIEW)   Final Result      Endotracheal tube terminates above the lefty. Enteric tube projects over the stomach.   Mild lung base atelectasis.      OUTSIDE IMAGES-CT HEAD   Final Result      OUTSIDE IMAGES-DX CHEST   Final Result           Assessment/Plan  * Stroke (HCC)- (present on admission)   Assessment & Plan    Right sided MCA  Loop recorder placed today  Status post right M1 thrombectomy  Continue aspirin and statin  PT/OT/SLP  Acute rehab pending        Cerebral edema (HCC)- (present on admission)   Assessment & Plan    Discussed with Dr. Garcia  Continue clinical monitoring        Acute respiratory failure with hypoxia (HCC)- (present on admission)   Assessment & Plan    Intubated on 9/6/2018    extubated 9/8/2018    Aspiration precautions   RT protocol           Dysphagia   Assessment & Plan    Speech following  FEES pending  cortrack in place        Hypokalemia   Assessment & Plan    Resolved        Cardiomyopathy (HCC)   Assessment & Plan    Appreciate cardiology assistance  On low-dose aspirin  Continue statin  Continue carvedilol  Plan is for anticoagulation 2 weeks post stroke if remains stable          Swelling of lower extremity- (present on admission)   Assessment & Plan      Improved  Lower extremity duplex negative for DVT

## 2018-09-10 NOTE — PROGRESS NOTES
Pt arrived back to unit from loop recorder placement. Site assessed. Dressing clean dry and intact. Pt c/o of back pain, heat packs and comfort measures provided. Bed alarm on and call light and personal belongings within reach.

## 2018-09-10 NOTE — THERAPY
"Physical Therapy Evaluation completed.   Bed Mobility: Moderate Assist     Transfers: Minimal Assist    Gait: Minimal Assist with Front-Wheel Walker       Plan of Care: Will benefit from Physical Therapy 5 times per week  Discharge Recommendations: Equipment: Will Continue to Assess for Equipment Needs. DC recs: Acute Rehab    Pt is s/p R MCA infarct with thrombectomy and presents upon formal assessment, with 5/5 strength to R LE and 4 to 4+/5 to L LE grossly. Functionally, L LE presents delayed with impaired coordination and motor planning. Pt able to initiate most tasks with cues and during bed mobility, required assist for L LE as well as trunk to upright. Once seated EOB with feet on floor, pt sustained midline but with bizarre placements of L UE; suspect gross L UE impaired proprioception. In standing and stepping, again, pt able to initiate but required cues for lift off as well as for managing L LE. With verbal cueing, pt able to increase step length to L LE as well as weight shift. He will benefit from ongoing acute PT intervention and could certainly tolerate and benefit from intensive multidisciplinary skilled PT upon DC.     See \"Rehab Therapy-Acute\" Patient Summary Report for complete documentation.     "

## 2018-09-10 NOTE — PROGRESS NOTES
Assumed pt care@1900. Pt A/Ox2. C/o of head ache 8/10. Medicated per MAR. Reposition pt q2h. Hourly rounding in place.

## 2018-09-10 NOTE — CARE PLAN
Problem: Nutritional:  Goal: Nutrition support tolerated and meeting greater than 85% of estimated needs  Outcome: PROGRESSING AS EXPECTED  Discussed with RN, TF advancing to goal s/p feeding tube replacement. Peptamen Intense VHP @ 45 ml/hr.

## 2018-09-10 NOTE — THERAPY
"Speech Language Therapy Clinical Swallow Evaluation completed.    Functional Status: Patient seen for swallow evaluation this date. Patient sleeping, but did arouse and was able to sustain arousal for session.  Patient oriented to person, place, month and year with some confusion to episode and recent events.  Patient with mildly dysarthric speech and decreased vocal intensity.  There is mild left side facial droop noted.  Presentation of PO consisted of ice, nectars, thin liquids and purees. Initially, patient with inconsistent wet voice and subtle throat clearing following swallows of all consistencies, but as session progressed and patient fatigued, he had delayed onset of swallow and increased S/Sx of aspiration with a choking event following single sip of thin liquids. Given site of lesion (R MCA) and concerns for aspiration, would like to proceed with FEES to further assess oropharyngeal function and r/o aspiration.  For now continue with NPO with cortrak.  SLP will follow for FEES tomorrow.  Patient will also need comprehensive cognitive/speech-language evaluation.      Recommendations - Diet:  NPO with cortrak                          Medication Administration:  Via cortrak  Plan of Care: Will benefit from Speech Therapy 5 times per week.  FEES tomorrow.  Post-Acute Therapy: Discharge to a transitional care facility for continued skilled therapy services.    See \"Rehab Therapy-Acute\" Patient Summary Report for complete documentation.   "

## 2018-09-10 NOTE — DISCHARGE PLANNING
Follow up for rehab please consult [facility], no provider identified for post acute services.

## 2018-09-10 NOTE — DISCHARGE PLANNING
Forwarding to Physiatry for consult per protocol. Dr. Naomi Taylor to consult. [facility] will follow for PM&R recommendation.

## 2018-09-10 NOTE — CARE PLAN
Problem: Safety  Goal: Will remain free from injury  Outcome: PROGRESSING AS EXPECTED  Preventing pt from pulling contraptions    Goal: Will remain free from falls  Outcome: PROGRESSING AS EXPECTED  Pt is impulsive, Houlry rounding in place    Problem: Skin Integrity  Goal: Risk for impaired skin integrity will decrease  Outcome: PROGRESSING AS EXPECTED  Reposition pt q2h    Problem: Pain  Goal: Alleviation of Pain or a reduction in pain to the patient's comfort goal  Outcome: DISCHARGED-GOAL NOT MET Date Met: 09/10/18

## 2018-09-11 ENCOUNTER — APPOINTMENT (OUTPATIENT)
Dept: RADIOLOGY | Facility: MEDICAL CENTER | Age: 53
DRG: 023 | End: 2018-09-11
Attending: INTERNAL MEDICINE
Payer: COMMERCIAL

## 2018-09-11 ENCOUNTER — APPOINTMENT (OUTPATIENT)
Dept: RADIOLOGY | Facility: MEDICAL CENTER | Age: 53
DRG: 023 | End: 2018-09-11
Attending: HOSPITALIST
Payer: COMMERCIAL

## 2018-09-11 LAB
ANION GAP SERPL CALC-SCNC: 12 MMOL/L (ref 0–11.9)
BASOPHILS # BLD AUTO: 1.1 % (ref 0–1.8)
BASOPHILS # BLD: 0.1 K/UL (ref 0–0.12)
BUN SERPL-MCNC: 26 MG/DL (ref 8–22)
CALCIUM SERPL-MCNC: 9.7 MG/DL (ref 8.5–10.5)
CHLORIDE SERPL-SCNC: 107 MMOL/L (ref 96–112)
CO2 SERPL-SCNC: 21 MMOL/L (ref 20–33)
CREAT SERPL-MCNC: 0.76 MG/DL (ref 0.5–1.4)
EOSINOPHIL # BLD AUTO: 0.38 K/UL (ref 0–0.51)
EOSINOPHIL NFR BLD: 4.2 % (ref 0–6.9)
ERYTHROCYTE [DISTWIDTH] IN BLOOD BY AUTOMATED COUNT: 41.8 FL (ref 35.9–50)
GLUCOSE BLD-MCNC: 101 MG/DL (ref 65–99)
GLUCOSE BLD-MCNC: 110 MG/DL (ref 65–99)
GLUCOSE BLD-MCNC: 130 MG/DL (ref 65–99)
GLUCOSE BLD-MCNC: 99 MG/DL (ref 65–99)
GLUCOSE SERPL-MCNC: 107 MG/DL (ref 65–99)
HCT VFR BLD AUTO: 44.3 % (ref 42–52)
HGB BLD-MCNC: 15.3 G/DL (ref 14–18)
IMM GRANULOCYTES # BLD AUTO: 0.2 K/UL (ref 0–0.11)
IMM GRANULOCYTES NFR BLD AUTO: 2.2 % (ref 0–0.9)
LYMPHOCYTES # BLD AUTO: 0.82 K/UL (ref 1–4.8)
LYMPHOCYTES NFR BLD: 9.1 % (ref 22–41)
MAGNESIUM SERPL-MCNC: 2 MG/DL (ref 1.5–2.5)
MCH RBC QN AUTO: 29.2 PG (ref 27–33)
MCHC RBC AUTO-ENTMCNC: 34.5 G/DL (ref 33.7–35.3)
MCV RBC AUTO: 84.5 FL (ref 81.4–97.8)
MONOCYTES # BLD AUTO: 1.03 K/UL (ref 0–0.85)
MONOCYTES NFR BLD AUTO: 11.5 % (ref 0–13.4)
NEUTROPHILS # BLD AUTO: 6.44 K/UL (ref 1.82–7.42)
NEUTROPHILS NFR BLD: 71.9 % (ref 44–72)
NRBC # BLD AUTO: 0 K/UL
NRBC BLD-RTO: 0 /100 WBC
PHOSPHATE SERPL-MCNC: 3.3 MG/DL (ref 2.5–4.5)
PLATELET # BLD AUTO: 332 K/UL (ref 164–446)
PMV BLD AUTO: 9.8 FL (ref 9–12.9)
POTASSIUM SERPL-SCNC: 3.6 MMOL/L (ref 3.6–5.5)
RBC # BLD AUTO: 5.24 M/UL (ref 4.7–6.1)
SODIUM SERPL-SCNC: 140 MMOL/L (ref 135–145)
WBC # BLD AUTO: 9 K/UL (ref 4.8–10.8)

## 2018-09-11 PROCEDURE — 84100 ASSAY OF PHOSPHORUS: CPT

## 2018-09-11 PROCEDURE — 700102 HCHG RX REV CODE 250 W/ 637 OVERRIDE(OP): Performed by: HOSPITALIST

## 2018-09-11 PROCEDURE — 82962 GLUCOSE BLOOD TEST: CPT | Mod: 91

## 2018-09-11 PROCEDURE — 92612 ENDOSCOPY SWALLOW (FEES) VID: CPT

## 2018-09-11 PROCEDURE — A9270 NON-COVERED ITEM OR SERVICE: HCPCS | Performed by: PSYCHIATRY & NEUROLOGY

## 2018-09-11 PROCEDURE — G8997 SWALLOW GOAL STATUS: HCPCS | Mod: CH

## 2018-09-11 PROCEDURE — 36415 COLL VENOUS BLD VENIPUNCTURE: CPT

## 2018-09-11 PROCEDURE — 99232 SBSQ HOSP IP/OBS MODERATE 35: CPT | Performed by: HOSPITALIST

## 2018-09-11 PROCEDURE — 700102 HCHG RX REV CODE 250 W/ 637 OVERRIDE(OP): Performed by: PSYCHIATRY & NEUROLOGY

## 2018-09-11 PROCEDURE — A9270 NON-COVERED ITEM OR SERVICE: HCPCS | Performed by: HOSPITALIST

## 2018-09-11 PROCEDURE — 85025 COMPLETE CBC W/AUTO DIFF WBC: CPT

## 2018-09-11 PROCEDURE — 770006 HCHG ROOM/CARE - MED/SURG/GYN SEMI*

## 2018-09-11 PROCEDURE — A9270 NON-COVERED ITEM OR SERVICE: HCPCS | Performed by: INTERNAL MEDICINE

## 2018-09-11 PROCEDURE — 83735 ASSAY OF MAGNESIUM: CPT

## 2018-09-11 PROCEDURE — 80048 BASIC METABOLIC PNL TOTAL CA: CPT

## 2018-09-11 PROCEDURE — G8996 SWALLOW CURRENT STATUS: HCPCS | Mod: CK

## 2018-09-11 PROCEDURE — 700102 HCHG RX REV CODE 250 W/ 637 OVERRIDE(OP): Performed by: INTERNAL MEDICINE

## 2018-09-11 RX ADMIN — ACETAMINOPHEN 650 MG: 325 TABLET, FILM COATED ORAL at 15:44

## 2018-09-11 RX ADMIN — OXYCODONE HYDROCHLORIDE 10 MG: 10 TABLET ORAL at 22:52

## 2018-09-11 RX ADMIN — SENNOSIDES AND DOCUSATE SODIUM 2 TABLET: 8.6; 5 TABLET ORAL at 17:12

## 2018-09-11 RX ADMIN — ATORVASTATIN CALCIUM 80 MG: 80 TABLET, FILM COATED ORAL at 17:12

## 2018-09-11 RX ADMIN — SENNOSIDES AND DOCUSATE SODIUM 2 TABLET: 8.6; 5 TABLET ORAL at 05:59

## 2018-09-11 RX ADMIN — OXYCODONE HYDROCHLORIDE 5 MG: 5 TABLET ORAL at 17:12

## 2018-09-11 RX ADMIN — CARVEDILOL 3.12 MG: 6.25 TABLET, FILM COATED ORAL at 07:34

## 2018-09-11 RX ADMIN — OXYCODONE HYDROCHLORIDE 10 MG: 10 TABLET ORAL at 03:32

## 2018-09-11 RX ADMIN — OXYCODONE HYDROCHLORIDE 10 MG: 10 TABLET ORAL at 07:34

## 2018-09-11 RX ADMIN — CARVEDILOL 3.12 MG: 6.25 TABLET, FILM COATED ORAL at 17:11

## 2018-09-11 RX ADMIN — ASPIRIN 81 MG: 81 TABLET, CHEWABLE ORAL at 05:59

## 2018-09-11 RX ADMIN — OXYCODONE HYDROCHLORIDE 10 MG: 10 TABLET ORAL at 12:53

## 2018-09-11 ASSESSMENT — ENCOUNTER SYMPTOMS
WEAKNESS: 0
SENSORY CHANGE: 1
WEIGHT LOSS: 0
RESPIRATORY NEGATIVE: 1
CARDIOVASCULAR NEGATIVE: 1
EYES NEGATIVE: 1
SORE THROAT: 0
DIZZINESS: 0
NAUSEA: 0
FEVER: 0
GASTROINTESTINAL NEGATIVE: 1
BLURRED VISION: 0
MYALGIAS: 0
MUSCULOSKELETAL NEGATIVE: 1
SPEECH CHANGE: 1
CONSTITUTIONAL NEGATIVE: 1
DIAPHORESIS: 0
DEPRESSION: 0
PALPITATIONS: 0
COUGH: 0
ABDOMINAL PAIN: 0
FOCAL WEAKNESS: 0
SHORTNESS OF BREATH: 0
ROS GI COMMENTS: DYSPHAGIA
CHILLS: 0
BRUISES/BLEEDS EASILY: 0
PSYCHIATRIC NEGATIVE: 1
VOMITING: 0

## 2018-09-11 ASSESSMENT — LIFESTYLE VARIABLES
TOTAL SCORE: 1
AVERAGE NUMBER OF DAYS PER WEEK YOU HAVE A DRINK CONTAINING ALCOHOL: 4
ALCOHOL_USE: YES
EVER FELT BAD OR GUILTY ABOUT YOUR DRINKING: NO
CONSUMPTION TOTAL: POSITIVE
HOW MANY TIMES IN THE PAST YEAR HAVE YOU HAD 5 OR MORE DRINKS IN A DAY: 5
HAVE YOU EVER FELT YOU SHOULD CUT DOWN ON YOUR DRINKING: NO
ON A TYPICAL DAY WHEN YOU DRINK ALCOHOL HOW MANY DRINKS DO YOU HAVE: 2
SUBSTANCE_ABUSE: 0
TOTAL SCORE: 1
EVER HAD A DRINK FIRST THING IN THE MORNING TO STEADY YOUR NERVES TO GET RID OF A HANGOVER: YES
TOTAL SCORE: 1
HAVE PEOPLE ANNOYED YOU BY CRITICIZING YOUR DRINKING: NO

## 2018-09-11 ASSESSMENT — PAIN SCALES - GENERAL
PAINLEVEL_OUTOF10: 10
PAINLEVEL_OUTOF10: 10
PAINLEVEL_OUTOF10: 6
PAINLEVEL_OUTOF10: 8
PAINLEVEL_OUTOF10: 10
PAINLEVEL_OUTOF10: 10

## 2018-09-11 ASSESSMENT — PATIENT HEALTH QUESTIONNAIRE - PHQ9
SUM OF ALL RESPONSES TO PHQ9 QUESTIONS 1 AND 2: 0
2. FEELING DOWN, DEPRESSED, IRRITABLE, OR HOPELESS: NOT AT ALL
1. LITTLE INTEREST OR PLEASURE IN DOING THINGS: NOT AT ALL

## 2018-09-11 NOTE — PROGRESS NOTES
Seen in EP rounds post ILR implant 9/10/18 for cryptogenic CVA.   Patient up in chair, just finished FEES swallow exam.     Middle chest ILR site is clean and dry.  No edema, erythema, or drainage noted.   Discussed use of ILR with patient and family at bedside     Patient is from [facility].  Possibly transitioning to acute rehab at [facility].  Will discuss with SW.  If stays local follow up appointment will be made with device clinic.     EP will sign off. Please call with any questions.

## 2018-09-11 NOTE — PROGRESS NOTES
Hospital Medicine Daily Progress Note    Date of Service  9/11/2018    Chief Complaint  53 y.o. male admitted 9/6/2018 with acute left sided weakness due to an acute mca stroke with hemorrhagic conversion.        Hospital Course  He did require intubation but has now been successfully extubated.  He also underwent a partial thrombectomy.    Interval Problem Update  Somnolent this am but improved this afternoon, sons updated.  He denies pain, no h/a currently, stable L weakness, FEES pending today    Consultants/Specialty  PMR  Neurology  Cardiology  Critical Care    Disposition  Acute rehab pending, delays due to insurance    Review of Systems  Review of Systems   Constitutional: Negative.  Negative for chills, diaphoresis, fever, malaise/fatigue and weight loss.   HENT: Negative.  Negative for sore throat.    Eyes: Negative.  Negative for blurred vision.   Respiratory: Negative.  Negative for cough and shortness of breath.    Cardiovascular: Negative.  Negative for chest pain, palpitations and leg swelling.   Gastrointestinal: Negative.  Negative for abdominal pain, nausea and vomiting.        Dysphagia   Genitourinary: Negative.  Negative for dysuria.   Musculoskeletal: Negative.  Negative for myalgias.   Skin: Negative.  Negative for itching and rash.   Neurological: Positive for sensory change and speech change. Negative for dizziness, focal weakness and weakness.   Endo/Heme/Allergies: Negative.  Does not bruise/bleed easily.   Psychiatric/Behavioral: Negative.  Negative for depression, substance abuse and suicidal ideas.   All other systems reviewed and are negative.       Physical Exam  Blood Pressure: 158/82   Temperature: 36.8 °C (98.3 °F)   Pulse: 72   Respiration: 15   Pulse Oximetry: 99 %     Physical Exam   Constitutional: He is oriented to person, place, and time. He appears well-developed and well-nourished. No distress.   HENT:   Head: Normocephalic and atraumatic.   Mouth/Throat: Oropharynx is clear  and moist.   cortrack in place   Eyes: Conjunctivae are normal. Right eye exhibits no discharge.   Cardiovascular: Normal rate, regular rhythm, normal heart sounds and intact distal pulses.  Exam reveals no gallop and no friction rub.    No murmur heard.  Pulmonary/Chest: Effort normal and breath sounds normal. No respiratory distress. He has no wheezes. He has no rales. He exhibits no tenderness.   Abdominal: Soft. Bowel sounds are normal. He exhibits no distension and no mass. There is no tenderness. There is no rebound and no guarding.   Musculoskeletal: Normal range of motion. He exhibits no edema, tenderness or deformity.   Neurological: He is alert and oriented to person, place, and time. He has normal reflexes. No cranial nerve deficit. He exhibits normal muscle tone. Coordination normal.   Mild l weakness   Skin: Skin is warm and dry. No rash noted. He is not diaphoretic. No erythema. No pallor.   Psychiatric: He has a normal mood and affect. His behavior is normal. Judgment and thought content normal.   Nursing note and vitals reviewed.      Fluids    Intake/Output Summary (Last 24 hours) at 09/11/18 1553  Last data filed at 09/11/18 1548   Gross per 24 hour   Intake             2881 ml   Output              100 ml   Net             2781 ml       Laboratory  Recent Labs      09/09/18   0512  09/10/18   0251  09/11/18   0500   WBC  9.2  7.6  9.0   RBC  4.91  5.00  5.24   HEMOGLOBIN  14.4  14.8  15.3   HEMATOCRIT  43.2  43.4  44.3   MCV  88.0  86.8  84.5   MCH  29.3  29.6  29.2   MCHC  33.3*  34.1  34.5   RDW  44.7  42.3  41.8   PLATELETCT  280  300  332   MPV  9.9  9.6  9.8     Recent Labs      09/09/18   0512  09/10/18   0251  09/11/18   0500   SODIUM  137  140  140   POTASSIUM  3.4*  3.6  3.6   CHLORIDE  107  105  107   CO2  22  26  21   GLUCOSE  92  95  107*   BUN  19  18  26*   CREATININE  0.67  0.76  0.76   CALCIUM  8.8  9.5  9.7                   Imaging  DX-CHEST-PORTABLE (1 VIEW)   Final Result       Unchanged bibasilar atelectasis and/or airspace disease      [id number] VIEW   Final Result      Enteric tube tip projects over the gastric antrum                  DX-ABDOMEN FOR TUBE PLACEMENT   Final Result         Feeding tube with tip projecting over the expected area of the stomach fundus.      DX-CHEST-PORTABLE (1 VIEW)   Final Result         1.  Pulmonary edema and/or infiltrates.      CT-HEAD W/O   Final Result         1. Evolving right MCA territory infarct with slight interval increase in local mass effect from local edema. Effacement of the right frontal horn. No hydrocephalus.   2. Stable hemorrhagic products within the infarct. No progressive parenchymal hemorrhage.      MR-BRAIN-W/O   Final Result      1.  Moderate size acute right MCA territory infarct with evidence of hemorrhagic conversion.   2.  6 mm of right-to-left midline shift with effacement of the right lateral and third ventricles. No evidence of hydrocephalus.   3.  Findings of sinusitis as described above.   4.  Trace bilateral mastoid effusions. Findings could be consistent with mastoiditis in the appropriate clinical setting.      Attempts to convey these findings to Dr. PASTOR CLAYTON were initiated on 9/8/2018 2:03 PM. These findings were discussed with PASTOR CLAYTON on 9/8/2018 2:21 PM.      DX-CHEST-PORTABLE (1 VIEW)   Final Result         1.  Pulmonary edema and/or infiltrates are identified, which are stable since the prior exam.      ECHOCARDIOGRAM-COMP W/ CONT   Final Result      DX-ABDOMEN FOR TUBE PLACEMENT   Final Result      Enteric tube projects over the distal stomach/proximal duodenum.         DX-CHEST-PORTABLE (1 VIEW)   Final Result         1.  Mild pulmonary edema and/or infiltrates.      LE VENOUS DUPLEX   Final Result      IR-THROMBECTOMY ARTERY SECONDARY   Final Result      1. Subtotal occlusion of the right M1 segment.      2.  Successful cerebral thrombectomy performed with post intervention   angiogram  demonstrating  patent patent right M1 segment and M2 and M3 branches, with a persistent occlusion of a distal small posterior division M2 branch            CT-CTA HEAD WITH & W/O-POST PROCESS   Final Result      Findings consistent with thrombus within the distal right M1 segment with attenuation of flow within the right sylvian artery branches.   Remainder of the circulation appears patent.   Right cerebral edema. No acute hemorrhage.      CT-CTA NECK WITH & W/O-POST PROCESSING   Final Result      CT angiogram of the neck within normal limits.      CT-HEAD W/O   Final Result      Suspect right cerebral edema with mild right-sided mass effect and 3 mm right to left midline shift.   No acute intracranial hemorrhage.   No hydrocephalus.      CT-CEREBRAL PERFUSION ANALYSIS   Final Result      1.  CT perfusion examination over the limited section of brain reveals 11 mL of brain parenchyma has less than 30% of cerebral blood flow (CBF).      2.  Please note that the cerebral perfusion was performed on the limited brain tissue around the basal ganglia region. Infarct/ischemia outside the CT perfusion sections can be missed in this study.      DX-CHEST-PORTABLE (1 VIEW)   Final Result      Endotracheal tube terminates above the lefty. Enteric tube projects over the stomach.   Mild lung base atelectasis.      OUTSIDE IMAGES-CT HEAD   Final Result      OUTSIDE IMAGES-DX CHEST   Final Result           Assessment/Plan  * Stroke (HCC)- (present on admission)   Assessment & Plan    Right sided MCA  Loop recorder placed, POD #1  Status post right M1 thrombectomy  Continue aspirin and statin  PT/OT/SLP  Acute rehab pending        Cerebral edema (HCC)- (present on admission)   Assessment & Plan    Jose  Continue clinical monitoring        Acute respiratory failure with hypoxia (HCC)- (present on admission)   Assessment & Plan    Intubated on 9/6/2018   extubated 9/8/2018  Continue aspiration precautions   RT protocol            Dysphagia   Assessment & Plan    Speech following  FEES today  cortrack in place        Hypokalemia   Assessment & Plan    Resolved        Cardiomyopathy (HCC)   Assessment & Plan    Appreciate cardiology assistance  On low-dose aspirin  Continue statin  Continue carvedilol  Plan is for anticoagulation 2 weeks post stroke if remains stable          Swelling of lower extremity- (present on admission)   Assessment & Plan      Improved  Lower extremity duplex negative for DVT

## 2018-09-11 NOTE — DISCHARGE PLANNING
Anticipated Discharge Disposition: Acute Rehab    Action: LSW spoke to Eugenia with [facility] regarding patient's insurance coverage. Per Eugenia, patient still does not have insurance info on file.  Eugenia to follow up with patient's family.     Barriers to Discharge: Acute Rehab Acceptance/ Insurance authorization.    Plan: LSW to continue to assist with d/c as needed.

## 2018-09-11 NOTE — PROGRESS NOTES
"A/Ox3 disoriented to event. /73   Pulse 67   Temp 36.7 °C (98 °F)   Resp 17   Ht 1.803 m (5' 10.98\")   Wt 113.4 kg (250 lb)   SpO2 94%   BMI 34.88 kg/m²    Tolerating TF. Safety and fall precautions maintained throughout shift. Medicated as needed for pain. Turned Q2hrs. Neuro checks unchanged.  Will continue to monitor.   "

## 2018-09-11 NOTE — PROGRESS NOTES
Monitor summary: SR 64-90, UT 0.20, QRS 0.10, QT 0.34, with occasional PVCs and rare PACs per strip from monitor room.

## 2018-09-11 NOTE — THERAPY
Speech Language Therapy FEES completed.    Functional Status: Patient seen for FEES this date. FEES procedure explained to patient and he agreed to proceed.  Patient tolerated procedure well with minimal to moderate cues to sustain attention to task at hand. Presentation of PO consisted of ice chips, nectars, thin liquids, purees, soft solids and solids. Patient with SILENT penetration and aspiration before, after and suspected during the swallow on thin liquids.  On nectars he had intermittent high penetration before the swallow without evidence of aspiration.  On purees, he had intermittent penetration suspected during the swallow as evidenced in residue on the epiglottic rim and in the laryngeal vestibule and although there is risk for aspiration, none was seen on this texture during this evaluation. On soft solids, patient with penetration before, after and suspected during the swallow as evidenced in pieces of fruit on the epiglottic rim and in the laryngeal vestibule and blue tinged vocal cords.  There did appear to be small amounts of SILENT ASPIRATION on juice from fruit.  Furthermore, he had a moderate amount of vallecular and lateral channel residue (he did not sense this residue) on solids and soft solids which did eventually cleared with 2-3 more swallows and then a liquid wash.      IN SUMMARY:  Patient is presenting with oropharyngeal dysphagia as evidenced in impaired base of tongue retraction, weak laryngeal elevation and impaired pharyngeal constriction leading to premature spillage and delayed onset of swallow.  He did have some level of SILENT PENETRATION on all consistencies with SILENT ASPIRATION on thin liquids and to a lesser extent on mixed consistencies.  Also of note, there was 1 episode of retrograde flow that came up through the UES leading to silent penetration.  Patient does report history of reflux and is at risk for ascending aspiration. At this time, would recommend progression to  "Dysphagia I diet with nectar thick liquids and direct 1:1 assistance for all PO intake. Education provided to son, brother and patient.  Swallow precautions posted at [facility]. Patient will need ongoing reinforcement due to his poor insight, impulsivity and impaired memory.  SLP still needs to complete cognitive evaluation.    Recommendations - Diet: Dysphagia I diet with NECTAR THICK LIQUIDS ONLY                          Strategies: Direct supervision during meals, No Straws and Head of Bed at 90 degrees/UP IN CHAIR PREFERRED for all PO intake and for 30 minutes following                          Medication Administration: crush in APPLESAUCE   Plan of Care: Will benefit from Speech Therapy 5 times per week  Post-Acute Therapy: Discharge to a transitional care facility for continued skilled therapy services.    See \"Rehab Therapy-Acute\" Patient Summary Report for complete documentation.   "

## 2018-09-11 NOTE — CARE PLAN
Problem: Nutritional:  Goal: Nutrition support tolerated and meeting greater than 85% of estimated needs  Outcome: MET Date Met: 09/11/18  TF running @ goal

## 2018-09-11 NOTE — DISCHARGE PLANNING
Called [facility] HR Rep Vannessa Breen at [phone] who informed me that patient's insurance will be [employer] which will be active tomorrow 9/12/18.  Information should be uploaded in [employer] system then and family will have the members ID card tomorrow.

## 2018-09-11 NOTE — THERAPY
"Attempted therapy follow up session this pm. Pt very lethargic this afternoon. Pt barely opening eyes and acknowledging therapist. Pt did grumble \" i'm tired\". RN noitifed and aware. Will follow up at a later date as appropriate.   "

## 2018-09-11 NOTE — THERAPY
Attempted to see pt for OT tx. Pt lethargic and unable to maintain arousal. Will try again later as appropriate.

## 2018-09-11 NOTE — PROGRESS NOTES
Monitor summary: SR 62-82, ME .18, QRS .08, QT .36, with occasional PVCs/PACs per strip from monitor room.

## 2018-09-11 NOTE — PROGRESS NOTES
Neurosurgery    Patient continues to improve.  His more alert and is participating in his care.    On exam,   He is alert and oriented  Perrl  Left hemiparesis at 3/5 in the upper extremity an 4-/5 in the left lower extremity    Will sign of for now.   Please call if patient deteriorates    Thanks  OTTONIEL Garcia

## 2018-09-11 NOTE — CARE PLAN
Problem: Pain Management  Goal: Pain level will decrease to patient's comfort goal  Outcome: PROGRESSING AS EXPECTED  Pt medicated as needed for pain. Upon reassessment pt sleeping. Will continue to monitor and medicate as needed.

## 2018-09-12 PROBLEM — E87.6 HYPOKALEMIA: Status: RESOLVED | Noted: 2018-09-09 | Resolved: 2018-09-12

## 2018-09-12 PROBLEM — J96.01 ACUTE RESPIRATORY FAILURE WITH HYPOXIA (HCC): Status: RESOLVED | Noted: 2018-09-06 | Resolved: 2018-09-12

## 2018-09-12 PROBLEM — I69.391 DYSPHAGIA AS LATE EFFECT OF CEREBROVASCULAR ACCIDENT (CVA): Status: ACTIVE | Noted: 2018-09-10

## 2018-09-12 PROBLEM — M79.89 SWELLING OF LOWER EXTREMITY: Status: RESOLVED | Noted: 2018-09-06 | Resolved: 2018-09-12

## 2018-09-12 LAB
ANION GAP SERPL CALC-SCNC: 9 MMOL/L (ref 0–11.9)
BASOPHILS # BLD AUTO: 1.2 % (ref 0–1.8)
BASOPHILS # BLD: 0.11 K/UL (ref 0–0.12)
BUN SERPL-MCNC: 25 MG/DL (ref 8–22)
CALCIUM SERPL-MCNC: 9.8 MG/DL (ref 8.5–10.5)
CHLORIDE SERPL-SCNC: 107 MMOL/L (ref 96–112)
CO2 SERPL-SCNC: 24 MMOL/L (ref 20–33)
CREAT SERPL-MCNC: 0.77 MG/DL (ref 0.5–1.4)
EOSINOPHIL # BLD AUTO: 0.48 K/UL (ref 0–0.51)
EOSINOPHIL NFR BLD: 5.4 % (ref 0–6.9)
ERYTHROCYTE [DISTWIDTH] IN BLOOD BY AUTOMATED COUNT: 43.2 FL (ref 35.9–50)
GLUCOSE BLD-MCNC: 110 MG/DL (ref 65–99)
GLUCOSE BLD-MCNC: 121 MG/DL (ref 65–99)
GLUCOSE BLD-MCNC: 96 MG/DL (ref 65–99)
GLUCOSE SERPL-MCNC: 101 MG/DL (ref 65–99)
HCT VFR BLD AUTO: 45.6 % (ref 42–52)
HGB BLD-MCNC: 15.1 G/DL (ref 14–18)
IMM GRANULOCYTES # BLD AUTO: 0.1 K/UL (ref 0–0.11)
IMM GRANULOCYTES NFR BLD AUTO: 1.1 % (ref 0–0.9)
LYMPHOCYTES # BLD AUTO: 0.94 K/UL (ref 1–4.8)
LYMPHOCYTES NFR BLD: 10.5 % (ref 22–41)
MCH RBC QN AUTO: 28.4 PG (ref 27–33)
MCHC RBC AUTO-ENTMCNC: 33.1 G/DL (ref 33.7–35.3)
MCV RBC AUTO: 85.9 FL (ref 81.4–97.8)
MONOCYTES # BLD AUTO: 1.06 K/UL (ref 0–0.85)
MONOCYTES NFR BLD AUTO: 11.8 % (ref 0–13.4)
NEUTROPHILS # BLD AUTO: 6.27 K/UL (ref 1.82–7.42)
NEUTROPHILS NFR BLD: 70 % (ref 44–72)
NRBC # BLD AUTO: 0 K/UL
NRBC BLD-RTO: 0 /100 WBC
PLATELET # BLD AUTO: 352 K/UL (ref 164–446)
PMV BLD AUTO: 9.8 FL (ref 9–12.9)
POTASSIUM SERPL-SCNC: 3.6 MMOL/L (ref 3.6–5.5)
RBC # BLD AUTO: 5.31 M/UL (ref 4.7–6.1)
SODIUM SERPL-SCNC: 140 MMOL/L (ref 135–145)
WBC # BLD AUTO: 9 K/UL (ref 4.8–10.8)

## 2018-09-12 PROCEDURE — A9270 NON-COVERED ITEM OR SERVICE: HCPCS | Performed by: HOSPITALIST

## 2018-09-12 PROCEDURE — 99232 SBSQ HOSP IP/OBS MODERATE 35: CPT | Performed by: HOSPITALIST

## 2018-09-12 PROCEDURE — 85025 COMPLETE CBC W/AUTO DIFF WBC: CPT

## 2018-09-12 PROCEDURE — 82962 GLUCOSE BLOOD TEST: CPT

## 2018-09-12 PROCEDURE — 97530 THERAPEUTIC ACTIVITIES: CPT

## 2018-09-12 PROCEDURE — 97535 SELF CARE MNGMENT TRAINING: CPT

## 2018-09-12 PROCEDURE — 700102 HCHG RX REV CODE 250 W/ 637 OVERRIDE(OP): Performed by: PSYCHIATRY & NEUROLOGY

## 2018-09-12 PROCEDURE — 700102 HCHG RX REV CODE 250 W/ 637 OVERRIDE(OP): Performed by: INTERNAL MEDICINE

## 2018-09-12 PROCEDURE — A9270 NON-COVERED ITEM OR SERVICE: HCPCS | Performed by: INTERNAL MEDICINE

## 2018-09-12 PROCEDURE — 97116 GAIT TRAINING THERAPY: CPT

## 2018-09-12 PROCEDURE — A9270 NON-COVERED ITEM OR SERVICE: HCPCS | Performed by: PSYCHIATRY & NEUROLOGY

## 2018-09-12 PROCEDURE — 36415 COLL VENOUS BLD VENIPUNCTURE: CPT

## 2018-09-12 PROCEDURE — 700102 HCHG RX REV CODE 250 W/ 637 OVERRIDE(OP): Performed by: HOSPITALIST

## 2018-09-12 PROCEDURE — 770006 HCHG ROOM/CARE - MED/SURG/GYN SEMI*

## 2018-09-12 PROCEDURE — 80048 BASIC METABOLIC PNL TOTAL CA: CPT

## 2018-09-12 RX ORDER — ATORVASTATIN CALCIUM 80 MG/1
80 TABLET, FILM COATED ORAL EVERY EVENING
Qty: 30 TAB | Refills: 0 | Status: SHIPPED | OUTPATIENT
Start: 2018-09-12 | End: 2018-09-12

## 2018-09-12 RX ORDER — DEXTROSE MONOHYDRATE 25 G/50ML
25 INJECTION, SOLUTION INTRAVENOUS PRN
Refills: 0
Start: 2018-09-12

## 2018-09-12 RX ORDER — ATORVASTATIN CALCIUM 80 MG/1
80 TABLET, FILM COATED ORAL EVERY EVENING
Qty: 30 TAB | Refills: 0
Start: 2018-09-12

## 2018-09-12 RX ORDER — CARVEDILOL 3.12 MG/1
3.12 TABLET ORAL 2 TIMES DAILY WITH MEALS
Qty: 60 TAB | Refills: 0
Start: 2018-09-12

## 2018-09-12 RX ORDER — ASPIRIN 81 MG/1
81 TABLET, CHEWABLE ORAL DAILY
Qty: 100 TAB | Refills: 0
Start: 2018-09-13

## 2018-09-12 RX ADMIN — OXYCODONE HYDROCHLORIDE 10 MG: 10 TABLET ORAL at 07:42

## 2018-09-12 RX ADMIN — OXYCODONE HYDROCHLORIDE 10 MG: 10 TABLET ORAL at 03:41

## 2018-09-12 RX ADMIN — OXYCODONE HYDROCHLORIDE 10 MG: 10 TABLET ORAL at 22:30

## 2018-09-12 RX ADMIN — CARVEDILOL 3.12 MG: 6.25 TABLET, FILM COATED ORAL at 07:42

## 2018-09-12 RX ADMIN — ASPIRIN 81 MG: 81 TABLET, CHEWABLE ORAL at 06:00

## 2018-09-12 RX ADMIN — OXYCODONE HYDROCHLORIDE 10 MG: 10 TABLET ORAL at 13:02

## 2018-09-12 RX ADMIN — ATORVASTATIN CALCIUM 80 MG: 80 TABLET, FILM COATED ORAL at 17:10

## 2018-09-12 RX ADMIN — CARVEDILOL 3.12 MG: 6.25 TABLET, FILM COATED ORAL at 17:10

## 2018-09-12 RX ADMIN — OXYCODONE HYDROCHLORIDE 10 MG: 10 TABLET ORAL at 17:10

## 2018-09-12 ASSESSMENT — ENCOUNTER SYMPTOMS
PSYCHIATRIC NEGATIVE: 1
FEVER: 0
MYALGIAS: 0
BRUISES/BLEEDS EASILY: 0
FOCAL WEAKNESS: 0
RESPIRATORY NEGATIVE: 1
GASTROINTESTINAL NEGATIVE: 1
ABDOMINAL PAIN: 0
DEPRESSION: 0
DIAPHORESIS: 0
SPEECH CHANGE: 1
BLURRED VISION: 0
NAUSEA: 0
WEIGHT LOSS: 0
WEAKNESS: 0
EYES NEGATIVE: 1
DIZZINESS: 0
CARDIOVASCULAR NEGATIVE: 1
ROS GI COMMENTS: DYSPHAGIA
CONSTITUTIONAL NEGATIVE: 1
SENSORY CHANGE: 1
SHORTNESS OF BREATH: 0
MUSCULOSKELETAL NEGATIVE: 1
SORE THROAT: 0
COUGH: 0
VOMITING: 0
CHILLS: 0
PALPITATIONS: 0

## 2018-09-12 ASSESSMENT — COGNITIVE AND FUNCTIONAL STATUS - GENERAL
MOBILITY SCORE: 13
TURNING FROM BACK TO SIDE WHILE IN FLAT BAD: A LOT
WALKING IN HOSPITAL ROOM: A LITTLE
DRESSING REGULAR LOWER BODY CLOTHING: A LOT
DRESSING REGULAR UPPER BODY CLOTHING: A LOT
HELP NEEDED FOR BATHING: A LOT
MOVING FROM LYING ON BACK TO SITTING ON SIDE OF FLAT BED: A LOT
STANDING UP FROM CHAIR USING ARMS: A LITTLE
SUGGESTED CMS G CODE MODIFIER MOBILITY: CL
DAILY ACTIVITIY SCORE: 14
TOILETING: A LOT
MOVING TO AND FROM BED TO CHAIR: A LOT
CLIMB 3 TO 5 STEPS WITH RAILING: TOTAL
SUGGESTED CMS G CODE MODIFIER DAILY ACTIVITY: CK
PERSONAL GROOMING: A LOT

## 2018-09-12 ASSESSMENT — PAIN SCALES - GENERAL
PAINLEVEL_OUTOF10: 10
PAINLEVEL_OUTOF10: 8

## 2018-09-12 ASSESSMENT — GAIT ASSESSMENTS
DISTANCE (FEET): 10
ASSISTIVE DEVICE: FRONT WHEEL WALKER
GAIT LEVEL OF ASSIST: MINIMAL ASSIST

## 2018-09-12 ASSESSMENT — LIFESTYLE VARIABLES: SUBSTANCE_ABUSE: 0

## 2018-09-12 NOTE — CARE PLAN
Problem: Safety  Goal: Will remain free from injury  Outcome: PROGRESSING AS EXPECTED  BEd alarm on, patient is not eating unsurpervised, call light within reach, bed in lowest position.    Problem: Knowledge Deficit  Goal: Knowledge of disease process/condition, treatment plan, diagnostic tests, and medications will improve  Outcome: PROGRESSING AS EXPECTED  Pt is being educated on POC, verbalizes understanding, team will continue to remind pt of POC.

## 2018-09-12 NOTE — PREADMISSION SCREENING NOTE
Pre-Admission Screening Form    Patient Information:   Name: Filiberto Klein     MRN: [id number]       : 1965      Age: 53 y.o.   Gender: male      Race: White [7]       Marital Status:  [4]  Family Contact: Ignacio Klein Dave        Relationship: Son [15]  Brother [1]  Home Phone: [phone]             Cell Phone:   [phone]  Advanced Directives: None  Code Status:  FULL  Current Attending Provider: Jennifer Whittington M.D.  Referring Physician: Dr. Rodriguez   Physiatrist Consult: Dr. Taylor    Referral Date: 18  Primary Payor Source:    Secondary Payor Source:      Medical Information:   Date of Admission to Acute Care [facility]2018  Room Number: S192/01  Rehabilitation Diagnosis: 0.1.1 (L) Body Involvement (R) Brain    There is no immunization history on file for this patient.  No Known Allergies  History reviewed. No pertinent past medical history.  History reviewed. No pertinent surgical history.    History Leading to Admission, Conditions that Caused the Need for Rehab (CMS):     Silvino Rodriguez M.D. Physician Signed Hospital Medicine  H&P Date of Service: 2018 12:01 PM         []Hide copied text  []Hover for attribution information  Hospital Medicine History & Physical Note     Date of Service  2018     Primary Care Physician  No primary care provider on file.     Consultants  Critical care  Neurology     Code Status  Full code     Chief Complaint  Loss of consciousness     History of Presenting Illness  53 y.o. male who presented 2018 with loss of consciousness.     Mr Klein does not regularly seek medical care, he does not have any known medical issues with the exception of back pain and intermittent lower extremity edema.  Family history is significant for blood clots.  He is intubated and sedated, he is unresponsive and unable to provide any history of present illness.  HPI comes from the patient's son Ignacio who is at the bedside.  The patient, his brother, and his son  were in a camping trip near [place], [place]. The patient was last seen normal at 2200 last night.  This morning at 3:45 in the morning, his son heard him fall from the other room.  When they got into the room they found that the patient was lying on the floor.  His left side was flaccid, he was moving his right side.  The patient was alert with his eyes open but was completely unable to speak.  Patient was incontinent.     Patient was taken to the emergency room in [facility] where he was evaluated.  ER chart reviewed by myself, per the chart he was opening his eyes to voice and withdrawing the left side.  He required intubation and was transferred to [facility] for stroke care.     Review of Systems  Review of Systems   Unable to perform ROS: Intubated         Past Medical History  Back pain  Intermittent lower extremity edema     Surgical History  None known     Family History  Son believes there is a family history of DVT     Social History  Patient smokes at least a pack of cigarettes per day  Drinks alcohol rarely  Works construction     Allergies  No Known Allergies     Medications  None         Physical Exam  Blood Pressure: 122/71   Temperature: 36.4 °C (97.5 °F)   Pulse: 89   Respiration: (!) 25   Pulse Oximetry: 100 %      Physical Exam   Constitutional: He is oriented to person, place, and time. He appears well-developed and well-nourished. No distress.   HENT:   Head: Normocephalic and atraumatic.   Eyes: Conjunctivae are normal.   Pupils are pinpoint, appear disconjugate   Cardiovascular: Normal rate, regular rhythm and intact distal pulses.    No murmur heard.  2+ Radial Pulses  Brisk Capillary Refill   Pulmonary/Chest:   Equal chest rise and fall  Mechanical breath sounds bilaterally  No overt wheezing   Abdominal: Soft. Bowel sounds are normal. He exhibits no distension. There is no tenderness. There is no rebound.   Musculoskeletal: Normal range of motion. He exhibits  edema.   Neurological: He is alert and oriented to person, place, and time. No cranial nerve deficit.   Intubated and sedated  Patient is spontaneously moving his right leg with semi-purposeful movements, he withdraws right side to painful stimuli, he does not respond to verbal command   Skin: Skin is warm and dry. No rash noted. He is not diaphoretic. No erythema.   Skin is warm and well perfused   Psychiatric: He has a normal mood and affect.         Laboratory:      Recent Labs      09/06/18   0935   WBC  9.7   RBC  5.08   HEMOGLOBIN  15.2   HEMATOCRIT  44.4   MCV  87.4   MCH  29.9   MCHC  34.2   RDW  44.2   PLATELETCT  287   MPV  9.5          Recent Labs      09/06/18   0935   SODIUM  135   POTASSIUM  4.5   CHLORIDE  107   CO2  21   GLUCOSE  113*   BUN  19   CREATININE  0.84   CALCIUM  8.8          Recent Labs      09/06/18   0935   ALTSGPT  19   ASTSGOT  24   ALKPHOSPHAT  61   TBILIRUBIN  0.5   GLUCOSE  113*          Recent Labs      09/06/18   0935   APTT  27.6   INR  1.02              Recent Labs      09/06/18   0935   TRIGLYCERIDE  335*            Lab Results   Component Value Date     TROPONINI 0.01 09/06/2018         Urinalysis:        Recent Labs      09/06/18   1025   SPECGRAVITY  1.014   GLUCOSEUR  Negative   KETONES  Negative   NITRITE  Negative   LEUKESTERAS  Negative   WBCURINE  0-2*   RBCURINE  *   BACTERIA  Negative   EPITHELCELL  Negative         Imaging:  CT-CTA HEAD WITH & W/O-POST PROCESS   Final Result       Findings consistent with thrombus within the distal right M1 segment with attenuation of flow within the right sylvian artery branches.   Remainder of the circulation appears patent.   Right cerebral edema. No acute hemorrhage.       CT-CTA NECK WITH & W/O-POST PROCESSING   Final Result       CT angiogram of the neck within normal limits.       CT-HEAD W/O   Final Result       Suspect right cerebral edema with mild right-sided mass effect and 3 mm right to left midline shift.   No  acute intracranial hemorrhage.   No hydrocephalus.       CT-CEREBRAL PERFUSION ANALYSIS   Final Result       1.  CT perfusion examination over the limited section of brain reveals 11 mL of brain parenchyma has less than 30% of cerebral blood flow (CBF).       2.  Please note that the cerebral perfusion was performed on the limited brain tissue around the basal ganglia region. Infarct/ischemia outside the CT perfusion sections can be missed in this study.       DX-CHEST-PORTABLE (1 VIEW)   Final Result       Endotracheal tube terminates above the lefty. Enteric tube projects over the stomach.   Mild lung base atelectasis.       OUTSIDE IMAGES-CT HEAD   Final Result       OUTSIDE IMAGES-DX CHEST   Final Result       MR-BRAIN-W/O    (Results Pending)   LE VENOUS DUPLEX    (Results Pending)            Assessment/Plan:  I anticipate this patient will require at least two midnights for appropriate medical management, necessitating inpatient admission.         * Stroke (HCC)- (present on admission)   Assessment & Plan     Patient appears to have a large right-sided stroke  Complicated by cerebral edema  Interventional radiology has been consulted, stat MRI of the brain is pending, patient may benefit from a mechanical thrombotic  Stroke order set initiated, admitted to the ICU          Cerebral edema (HCC)- (present on admission)   Assessment & Plan     Patient likely has extensive cytotoxic edema related to the stroke  This is causing a small amount of right to left shift  Await further recommendations from neurology          Acute respiratory failure with hypoxia (HCC)- (present on admission)   Assessment & Plan     Patient intubated for decreased level of consciousness  Spoke with Dr. Sharma of critical care who is kindly agreed to consult             Swelling of lower extremity- (present on admission)   Assessment & Plan     Recent travel, family history of blood clots, check lower extremity Dopplers                 VTE prophylaxis: lovenox        Naomi Taylor M.D. Physician Signed Physical Medicine & Rehab  Consults Date of Service: 9/10/2018  1:03 PM   Consult Orders:   IP CONSULT FOR PHYSIATRY [[id number]] ordered by Silvino Rodriguez M.D. at 09/10/18 1142         []Hide copied text  []Hover for attribution information  Medical chart review completed. Patient is a 53 y.o. year-old male admitted on 9/6 as a transfer from outside hospital with left sided weakness. Negative Head CT. He did not receive tPA as he was outside the time window. Required intubation for decreased level of consciousness, transferred here for further management. CTA here with thrombus in the right M1 and right sided cerebral edema. He underwent successful thrombectomy of the M1 Segment on 9/6, though with persistent occlusion of a distal small posterior M2 branch. MRI with moderate right MCA infarction with hemorrhagic conversion, right to left midline shift. ECHO with hypokinetic septum, EF 45%, with right to left shunt present. LE dopplers negative for DVTs. He had a loop recorder place by Dr. Olsen today. He was extubated 9/8.     Patient with multiple co-morbidities(including but not limited to hypertension, dysphagia, hypertriglyceridemia); with cognitive deficits and functional deficits in mobility/self-care/swallowing/speech, and Severe de-conditioning. Pre-morbidly, this patient lived in a single level home with Two  steps to enter, with his cousins. The patient was evaluated by acute care PT and OR; currently requiring moderate assistance for mobility and moderate to maximum assistance for ADLs, also with ongoing cognitive deficits.      The patient is an excellent candidate for an acute inpatient rehabilitation program with a coordinated program of care at an intensity and frequency not available at a lower level of care.      Please determine if the patient has anyone to assist him at home, as right sided strokes often have cognitive  deficits and he may require some supervision at discharge.      This recommendation is substantiated by the patient's current medical condition with intervention and assessment of medical issues requiring an acute level of care for patient's safety and maximum outcome. A coordinated program of care will be provided by an interdisciplinary team including physical therapy, occupational therapy, speech language pathology, physiatry, rehab nursing and rehab psychology. Rehab goals include improved cognition, mobility, self-care management, strength and conditioning/endurance, pain management, bowel and bladder management, mood and affect, and safety with independent home management including caregiver training. Estimated length of stay is approximately 21-28 days. Rehab potential: Good. Disposition: to pre-morbid independent living setting with supportive care of patient's family. We will continue to follow with you in anticipation of discharge to acute inpatient rehabilitation when medically stable to do so at the discretion of his  attending physician. Thank you for allowing us to participate in his care. Please call with any questions regarding this recommendation.        Dr. Bateman consult:  Assessment / Plan:  1. Right MCA large territory CVA  2. Probable PFO  3.  PACs, can be a precursor for atrial fibrillation.     -Case discussed with Dr. Reg Monique  -ASA, statin for now.  -30 monitor for afib or loop recorder  -Consider MARIA M to assess ASD or PFO.  High risk features would be a PFO greater than 2 mm or on aneurysmal intra-atrial septum.  -I would have a low threshold to initiate full anticoagulation after 10-14 days to cover him for atrial fibrillation given this type of stroke.  He did have hemorrhagic conversion so I defer the final decision to his neurologist.     Dr. Monique consult:  Patient seen and examined, agree with plan above.     EXAM- command follow, cog clarity and withdraw left UE weak off  sedation, pup 2/2     NIHSS score:  1a. LOC:   1b. LOC Questions:   1c. LOC Commands:   2. Best Gaze:   3. Visual Fields:   4. Facial Paresis:   5a. Motor arm left:   5b. Motor arm right:   6a. Motor leg left:   6b. Motor leg right:   7. Limb Ataxia:   8. Sensory:   9. Best Language:    10. Dysarthria:   11. Extinction/Inattention:      Total NIHSS: sedated     DSA 9/6/18 -   Impression:       1. Subtotal occlusion of the right M1 segment.    2.  Successful cerebral thrombectomy performed with post intervention   angiogram demonstrating  patent patent right M1 segment and M2 and M3 branches, with a persistent occlusion of a distal small posterior division M2 branch            R DISTAL M1 OCCLUSION, ACUTE ISCHEMIC STROKE, S/P THROMBECTOMY 9/6     Decision NOT to give alteplase: IN ED   Contraindication for IVtPA: PER ED MD NOTE      Presumed mechanism by TOAST:  __Large Artery Atherosclerosis  __Small Vessel (Lacunar)  _x_Cardioembolic  __Other (Sickle Cell, Vasculitis, Hypercoagulable)  __Unknown     Stroke Risk factors: smoking, obesity  Antithrombotic use prior to stroke: no     Post thrombectomy protocol     MRI brain without contrast     TTE with bubble, telemetry. Card MD consult for need for long term ICM or MARIA M     Stroke labs include lipids/TFT/a1c/trop/UA     Aspirin 81MG PO q daily.  If need full anticoagulation, then should wait several days from initial stroke symptom onset, and on approximately ???MR pending if remains neuro stable could consider starting full anticoagulation at that time.  Discontinue antiplatelet when anticoagulation therapeutic.  If need full anticoagulation, CT head prior to discharge to assure no ICH prior to start of anticoagulation is advisable.     STATIN FULL DOSE CRESTOR OR LIPITOR     ACEI/ARB, HCTZ, AND/OR CCB FOR JNC GOALS TOMORROW     BG MONITOR/CNTRL, DIET & LIFESTYLE/AVOID SMOKING DISCUSSED. Discussed stroke risks & mechanism of stroke injury, all questions  answered.        CRITICAL CARE  Upon my evaluation, this patient had a high probability of imminent or life-threatening deterioration due to cerebrovascular accident which required my direct attention, intervention, and personal management.      I personally provided 35 minutes of total critical care time outside of time spent on separately billable/documented procedures. Time includes: review of laboratory data, review of radiology studies, discussion with consultants, discussion with family/patient, monitoring for potential decompensation.  Interventions were performed as documented above.         Dr. Sharma consult:  ASSESSMENT:  1.  Acute right-sided cerebrovascular accident.  2.  Acute hypoxic respiratory failure secondary to above.  3.  Chronic tobacco use.  4.  Does not seek regular medical attention.     Plan:  Again, this is a 53-year-old gentleman with above-mentioned history as   well as presentation, currently on full mechanical ventilatory support.  At   this time, we will maintain titrate him ventilator based on ABG and pulmonary   mechanics.  Currently on propofol for sedation.  Will be maintained on   respiratory therapy and oxygen therapy protocols, was scheduled on p.r.n.    DuoNebs.  Given his underlying history of tobacco use would be certainly at   risk for COPD, but clinically not in exacerbation at this time.  In respect to   his CVA, patient has had a CTA showing a distal right M1 segment thrombus, is   pending MRI for further delineation of timing to event and being   simultaneously evaluated by neuro IR for potential thrombectomy pending MRI   results.  Meanwhile, we will continue supportive therapy as well as   post-stroke protocols and secondary workup.     Prognosis is extremely guarded.     Venkat Olsen M.D. Physician Signed Cardiac Electrophysiology  OP Report Date of Service: 9/10/2018 12:35 PM         []Hide copied text  []Corrina for attribution information  Electrophysiology  "Procedure Note  [facility]        PROCEDURE PERFORMED: Implantable Loop Recorder     : STEVIE Olsen M.D.     ASSISTANT: None           Jose Francisco Mcnamara M.D. Physician Signed Radiology  OR Surgeon Date of Service: 9/6/2018  1:50 PM         []Hide copied text  []Hover for attribution information  Immediate Post- Operative Note           PostOp Diagnosis: RT M-1 OCCLUSION        Procedure(s): MECHANICAL THROMBECTOMY        Estimated Blood Loss: Less than 5 ml           Complications: None        Brain MRI 09-08-18:  1.  Moderate size acute right MCA territory infarct with evidence of hemorrhagic conversion.  2.  6 mm of right-to-left midline shift with effacement of the right lateral and third ventricles. No evidence of hydrocephalus.  3.  Findings of sinusitis as described above.  4.  Trace bilateral mastoid effusions. Findings could be consistent with mastoiditis in the appropriate clinical setting.    Co-morbidities: See PMH  Potential Risk - Complications: Aphasia, Cognitive Impairment, Contractures, Deep Vein Thrombosis, Dysphagia, Incontinence, Malnutrition, Pain, Perceptual Impairment, Pneumonia, Pressure Ulcer and Urinary Tract Infection  Level of Risk: High    Ongoing Medical Management Needed (Medical/Nursing Needs):   Patient Active Problem List    Diagnosis Date Noted   • Stroke (HCC) 09/06/2018     Priority: High   • Cerebral edema (HCC) 09/06/2018     Priority: High   • Acute respiratory failure with hypoxia ([facility]) 09/06/2018     Priority: Medium   • Swelling of lower extremity 09/06/2018     Priority: Low   • Dysphagia 09/10/2018   • Hypokalemia 09/09/2018   • Cardiomyopathy ([facility]) 09/08/2018     A & O-disoriented to event    Current Vital Signs:   Temperature: 36.4 °C (97.6 °F) Pulse: 73 Respiration: 20 Blood Pressure: 141/86  Weight: 114.9 kg (253 lb 4.9 oz) Height: 180.3 cm (5' 10.98\")  Pulse Oximetry: 93 % O2 (LPM): 0      Completed Laboratory Reports:  Recent Labs      " 09/09/18   1416  09/09/18   1843  09/09/18   2116  09/09/18   2328  09/10/18   0251  09/10/18   0535  09/10/18   1129  09/10/18   1711  09/11/18   0035  09/11/18   0500  09/11/18   0558  09/11/18   1131  09/11/18   1710  09/12/18   0026  09/12/18   0501  09/12/18   0647   WBC   --    --    --    --   7.6   --    --    --    --   9.0   --    --    --    --   9.0   --    HEMOGLOBIN   --    --    --    --   14.8   --    --    --    --   15.3   --    --    --    --   15.1   --    HEMATOCRIT   --    --    --    --   43.4   --    --    --    --   44.3   --    --    --    --   45.6   --    PLATELETCT   --    --    --    --   300   --    --    --    --   332   --    --    --    --   352   --    SODIUM   --    --    --    --   140   --    --    --    --   140   --    --    --    --   140   --    POTASSIUM   --    --    --    --   3.6   --    --    --    --   3.6   --    --    --    --   3.6   --    BUN   --    --    --    --   18   --    --    --    --   26*   --    --    --    --   25*   --    CREATININE   --    --    --    --   0.76   --    --    --    --   0.76   --    --    --    --   0.77   --    ALBUMIN   --    --    --    --   3.8   --    --    --    --    --    --    --    --    --    --    --    GLUCOSE   --    --    --    --   95   --    --    --    --   107*   --    --    --    --   101*   --    POCGLUCOSE  80  92  98  94   --   109*  110*  96  99   --   101*  110*  130*  110*   --   96     Additional Labs: Not Applicable    Prior Living Situation:   Housing / Facility: 1 Story House  Steps Into Home: 0  Lives with - Patient's Self Care Capacity: Other (Comments)  Equipment Owned: None    Prior Level of Function / Living Situation:   Physical Therapy: Prior Services: None  Housing / Facility: 1 Story House  Steps Into Home: 0  Bathroom Set up: Bathtub / Shower Combination  Equipment Owned: None  Lives with - Patient's Self Care Capacity: Other (Comments)  Bed Mobility: Independent  Transfer Status:  Independent  Ambulation: Independent  Distance Ambulation (Feet):  (community)  Assistive Devices Used: None  Stairs: Independent  Current Level of Function:   Level Of Assist: Minimal Assist  Assistive Device: Front Wheel Walker  Distance (Feet):  (lateral stepping 2x3-4 steps, then stepping to chair x2)  Deviation: Decreased Base Of Support, Shuffled Gait, Decreased Heel Strike, Decreased Toe Off, Ataxic  Weight Bearing Status: FWB  Supine to Sit: Moderate Assist  Sit to Supine:  (chair )  Scooting: Maximal Assist  Sit to Stand: Moderate Assist  Bed, Chair, Wheelchair Transfer: Maximal Assist  Transfer Method:  (stand step with FWW)  Sitting in Chair: 5+ min  Sitting Edge of Bed: 10 min   Standin min   Occupational Therapy:   Self Feeding: Independent  Grooming / Hygiene: Independent  Bathing: Independent  Dressing: Independent  Toileting: Independent  Medication Management: Independent  Laundry: Independent  Kitchen Mobility: Independent  Finances: Independent  Home Management: Independent  Shopping: Independent  Prior Level Of Mobility: Independent With Device in Community, Independent Without Device in Community  Prior Services: None  Housing / Facility: [address]  Occupation (Pre-Hospital Vocational): Employed Full Time ()  Current Level of Function:   Upper Body Dressing: Moderate Assist  Lower Body Dressing: Maximal Assist  Toileting: Maximal Assist  Speech Language Pathology:   Assessment : Patient seen for FEES this date. FEES procedure explained to patient and he agreed to proceed.  Patient tolerated procedure well with minimal to moderate cues to sustain attention to task at hand. Presentation of PO consisted of ice chips, nectars, thin liquids, purees, soft solids and solids. Patient with SILENT penetration and aspiration before, after and suspected during the swallow on thin liquids.  On nectars he had intermittent high penetration before the swallow without evidence of  aspiration.  On purees, he had intermittent penetration suspected during the swallow as evidenced in residue on the epiglottic rim and in the laryngeal vestibule and although there is risk for aspiration, none was seen on this texture during this evaluation. On soft solids, patient with penetration before, after and suspected during the swallow as evidenced in pieces of fruit on the epiglottic rim and in the laryngeal vestibule and blue tinged vocal cords.  There did appear to be small amounts of SILENT ASPIRATION on juice from fruit.  Furthermore, he had a moderate amount of vallecular and lateral channel residue (he did not sense this residue) on solids and soft solids which did eventually cleared with 2-3 more swallows and then a liquid wash.    Problem List: Dysphagia, Dysarthia, Cognitive-Linguistic Deficits, Attention Deficit, Memory Deficit  Diet / Liquid Recommendation: NPO, Pre-Feeding Trials with SLP Only  Rehabilitation Prognosis/Potential: Good  Estimated Length of Stay: 21-28 days    Nursing:   Orientation : Disoriented to Event  Continent    Scope/Intensity of Services Recommended:  Physical Therapy: 1 hr / day  5 days / week. Therapeutic Interventions Required: Maximize Endurance, Mobility, Strength and Safety  Occupational Therapy: 1 hr / day 5 days / week. Therapeutic Interventions Required: Maximize Self Care, ADLs, IADLs and Energy Conservation  Speech & Language Pathology: 1 hr / day 5 days / week. Therapeutic Interventions Required: Maximize Cognition, Swallowing and Safety  Rehabilitation Nursin/7. Therapeutic Interventions Required: Monitor Pain, Skin, Vital Signs, Intake and Output, Labs, Safety, Aspiration Risk and Family Training  Rehabilitation Physician: 3 - 5 days / week. Therapeutic Interventions Required: Medical Management  Dietician: Tube Feedings. Therapeutic Interventions Required: .    Rehabilitation Goals and Plan (Expected frequency & duration of treatment in the IRF):    Return to the Community, Modified Independent Level of Care and Outpatient Support  Anticipated Date of Rehabilitation Admission: 09-12-18  Patient/Family oriented IRF level of care/facility/plan: Yes  Patient/Family willing to participate in IRF care/facility/plan: Yes  Patient able to tolerate IRF level of care proposed: Yes  Patient has potential to benefit IRF level of care proposed: Yes  Comments: Not Applicable    Special Needs or Precautions - Medical Necessity:  Tube Feedings   Safety Concerns/Precautions:  Fall Risk / High Risk for Falls, Balance, Cognition and Bed / Chair Alarm  Pain Management  Special Equipment: L Cortrak  IV Site: Peripheral  Current Medications:    Current Facility-Administered Medications Ordered in Epic   Medication Dose Route Frequency Provider Last Rate Last Dose   • carvedilol (COREG) tablet 3.125 mg  3.125 mg Oral BID WITH MEALS Chemo Sharma M.D.   3.125 mg at 09/12/18 0742   • oxyCODONE immediate release (ROXICODONE) tablet 10 mg  10 mg Feeding Tube Q4HRS PRN Brayden Cramer M.D.   10 mg at 09/12/18 0742    Or   • oxyCODONE immediate-release (ROXICODONE) tablet 5 mg  5 mg Feeding Tube Q4HRS PRN Brayden Cramer M.D.   5 mg at 09/11/18 1712   • aspirin (ASA) chewable tab 81 mg  81 mg Per NG Tube DAILY Reg Monique M.D.   81 mg at 09/12/18 0600   • lidocaine (XYLOCAINE) 2 % injection 2 mL  2 mL Tracheal Tube Q HOUR PRN Leonardo Johnson M.D.   2 mL at 09/07/18 2029   • labetalol (NORMODYNE,TRANDATE) injection 10 mg  10 mg Intravenous Q4HRS PRN Silvino Rodriguez M.D.        Or   • hydrALAZINE (APRESOLINE) injection 10 mg  10 mg Intravenous Q2HRS PRN Silvino Rodriguez M.D.       • atorvastatin (LIPITOR) tablet 80 mg  80 mg Per NG Tube Q EVENING Silvino Rodriguez M.D.   80 mg at 09/11/18 1712   • senna-docusate (PERICOLACE or SENOKOT S) 8.6-50 MG per tablet 2 Tab  2 Tab Per NG Tube BID Silvino Rodriguez M.D.   Stopped at 09/12/18 0600    And   • polyethylene glycol/lytes (MIRALAX)  PACKET 1 Packet  1 Packet Per NG Tube QDAY PRN Silvino Rodriguez M.D.        And   • magnesium hydroxide (MILK OF MAGNESIA) suspension 30 mL  30 mL Per NG Tube QDAY PRN Silvino Rodriguez M.D.        And   • bisacodyl (DULCOLAX) suppository 10 mg  10 mg Rectal QDAY PRN Silvino Rodriguez M.D.       • ondansetron (ZOFRAN) syringe/vial injection 4 mg  4 mg Intravenous Q4HRS PRN Silvino Rodriguez M.D.       • promethazine (PHENERGAN) tablet 12.5-25 mg  12.5-25 mg Oral Q4HRS PRN Silvino Rodriguez M.D.       • promethazine (PHENERGAN) suppository 12.5-25 mg  12.5-25 mg Rectal Q4HRS PRDEBORAH Rodriguez M.D.       • prochlorperazine (COMPAZINE) injection 5-10 mg  5-10 mg Intravenous Q4HRS PRN Silvino Rodriguez M.D.       • acetaminophen (TYLENOL) tablet 650 mg  650 mg Per NG Tube Q6HRS PRN Silvino Rodriguez M.D.   650 mg at 09/11/18 1544   • Respiratory Care per Protocol   Nebulization Continuous RT Cheom Sharma M.D.       • ipratropium-albuterol (DUONEB) nebulizer solution  3 mL Nebulization Q2HRS PRN (RT) Chemo Sharma M.D.       • insulin regular (HUMULIN R) injection 2-9 Units  2-9 Units Subcutaneous Q6HRS Chemo Sharma M.D.   Stopped at 09/06/18 1800    And   • dextrose 50% (D50W) injection 25 mL  25 mL Intravenous Q15 MIN PRN Chemo Sharma M.D.         No current Epic-ordered outpatient prescriptions on file.     Diet:   DIET ORDERS (Through next 24h)    Start     Ordered    09/11/18 1041  Diet Order Regular  ALL MEALS     Question Answer Comment   Diet: Regular    Texture/Fiber modifications: Dysphagia 1(Pureed)specify fluid consistency(question 6)    Consistency/Fluid modifications: Nectar Thick    Miscellaneous modifications: SLP - 1:1 Supervision by Nursing    Miscellaneous modifications: SLP - Deliver to Nursing Station        09/11/18 1040          Anticipated Discharge Destination / Patient/Family Goal:  Destination: Home with Assistance Support System: Family   Anticipated home health services: OT, PT, SLP, Nursing, Social Work  and Aide  Previously used HH service/ provider: Not Applicable  Anticipated DME Needs: Walker, Wheelchair, Ramp and Life Line  Outpatient Services: OT, PT and SLP  Alternative resources to address additional identified needs:     Pre-Screen Completed: 2018 7:51 AM Jerald Masters L.P.N. Updated Pre-Screen Assessment     Name: Filiberto Klein  MRN: [id number]  : 1965    Medical Status/ Changes:        Jennifer Whittington M.D. Physician Signed Hospital Medicine  Progress Notes Date of Service: 2018  1:32 PM         []Hide copied text  Hospital Medicine Daily Progress Note     Date of Service  2018     Chief Complaint  53 y.o. male admitted 2018 with acute left sided weakness due to an acute mca stroke with hemorrhagic conversion.          Hospital Course  He did require intubation but has now been successfully extubated.  He also underwent a partial thrombectomy.     Interval Problem Update  More alert this am  axox3  No change in L sided weakness  cortrack out as good oral intake, tolerating Dysphagia I diet     Consultants/Specialty  PMR  Neurology  Cardiology  Critical Care     Disposition  Acute rehab pending, still awaiting insurance authorization     Review of Systems  Review of Systems   Constitutional: Negative.  Negative for chills, diaphoresis, fever, malaise/fatigue and weight loss.   HENT: Negative.  Negative for sore throat.    Eyes: Negative.  Negative for blurred vision.   Respiratory: Negative.  Negative for cough and shortness of breath.    Cardiovascular: Negative.  Negative for chest pain, palpitations and leg swelling.   Gastrointestinal: Negative.  Negative for abdominal pain, nausea and vomiting.        Dysphagia   Genitourinary: Negative.  Negative for dysuria.   Musculoskeletal: Negative.  Negative for myalgias.   Skin: Negative.  Negative for itching and rash.   Neurological: Positive for sensory change and speech change. Negative for dizziness, focal weakness and weakness.    Endo/Heme/Allergies: Negative.  Does not bruise/bleed easily.   Psychiatric/Behavioral: Negative.  Negative for depression, substance abuse and suicidal ideas.   All other systems reviewed and are negative.        Physical Exam  Blood Pressure: 158/82   Temperature: 36.8 °C (98.3 °F)   Pulse: 72   Respiration: 15   Pulse Oximetry: 99 %      Physical Exam   Constitutional: He is oriented to person, place, and time. He appears well-developed and well-nourished. No distress.   HENT:   Head: Normocephalic and atraumatic.   Mouth/Throat: Oropharynx is clear and moist.   cortrack in place   Eyes: Conjunctivae are normal. Right eye exhibits no discharge.   Cardiovascular: Normal rate, regular rhythm, normal heart sounds and intact distal pulses.  Exam reveals no gallop and no friction rub.    No murmur heard.  Pulmonary/Chest: Effort normal and breath sounds normal. No respiratory distress. He has no wheezes. He has no rales. He exhibits no tenderness.   Abdominal: Soft. Bowel sounds are normal. He exhibits no distension and no mass. There is no tenderness. There is no rebound and no guarding.   Musculoskeletal: Normal range of motion. He exhibits no edema, tenderness or deformity.   Neurological: He is alert and oriented to person, place, and time. He has normal reflexes. No cranial nerve deficit. He exhibits normal muscle tone. Coordination normal.   Mild l weakness   Skin: Skin is warm and dry. No rash noted. He is not diaphoretic. No erythema. No pallor.   Psychiatric: He has a normal mood and affect. His behavior is normal. Judgment and thought content normal.   Nursing note and vitals reviewed.        Fluids     Intake/Output Summary (Last 24 hours) at 09/13/18 1334  Last data filed at 09/13/18 0400    Gross per 24 hour   Intake              240 ml   Output             1500 ml   Net            -1260 ml         Laboratory        Recent Labs      09/11/18   0500  09/12/18   0501  09/13/18   0451   WBC  9.0  9.0  9.2    RBC  5.24  5.31  5.26   HEMOGLOBIN  15.3  15.1  15.0   HEMATOCRIT  44.3  45.6  45.5   MCV  84.5  85.9  86.5   MCH  29.2  28.4  28.5   MCHC  34.5  33.1*  33.0*   RDW  41.8  43.2  43.3   PLATELETCT  332  352  312   MPV  9.8  9.8  10.0            Recent Labs      09/11/18   0500  09/12/18   0501  09/13/18   0451   SODIUM  140  140  137   POTASSIUM  3.6  3.6  3.9   CHLORIDE  107  107  105   CO2  21  24  23   GLUCOSE  107*  101*  105*   BUN  26*  25*  22   CREATININE  0.76  0.77  0.88   CALCIUM  9.7  9.8  8.8                     Imaging  DX-ABDOMEN FOR TUBE PLACEMENT   Final Result       Feeding tube tip at the distal stomach.       DX-CHEST-PORTABLE (1 VIEW)   Final Result       Unchanged bibasilar atelectasis and/or airspace disease       IR-IBFUJRW-4 VIEW   Final Result       Enteric tube tip projects over the gastric antrum                       DX-ABDOMEN FOR TUBE PLACEMENT   Final Result           Feeding tube with tip projecting over the expected area of the stomach fundus.       DX-CHEST-PORTABLE (1 VIEW)   Final Result           1.  Pulmonary edema and/or infiltrates.       CT-HEAD W/O   Final Result           1. Evolving right MCA territory infarct with slight interval increase in local mass effect from local edema. Effacement of the right frontal horn. No hydrocephalus.   2. Stable hemorrhagic products within the infarct. No progressive parenchymal hemorrhage.       MR-BRAIN-W/O   Final Result       1.  Moderate size acute right MCA territory infarct with evidence of hemorrhagic conversion.   2.  6 mm of right-to-left midline shift with effacement of the right lateral and third ventricles. No evidence of hydrocephalus.   3.  Findings of sinusitis as described above.   4.  Trace bilateral mastoid effusions. Findings could be consistent with mastoiditis in the appropriate clinical setting.       Attempts to convey these findings to Dr. PASTOR CLAYTON were initiated on 9/8/2018 2:03 PM. These findings were  discussed with PASTOR CLAYTON on 9/8/2018 2:21 PM.       DX-CHEST-PORTABLE (1 VIEW)   Final Result           1.  Pulmonary edema and/or infiltrates are identified, which are stable since the prior exam.       ECHOCARDIOGRAM-COMP W/ CONT   Final Result       DX-ABDOMEN FOR TUBE PLACEMENT   Final Result       Enteric tube projects over the distal stomach/proximal duodenum.           DX-CHEST-PORTABLE (1 VIEW)   Final Result           1.  Mild pulmonary edema and/or infiltrates.       LE VENOUS DUPLEX   Final Result       IR-THROMBECTOMY ARTERY SECONDARY   Final Result       1. Subtotal occlusion of the right M1 segment.       2.  Successful cerebral thrombectomy performed with post intervention   angiogram demonstrating  patent patent right M1 segment and M2 and M3 branches, with a persistent occlusion of a distal small posterior division M2 branch               CT-CTA HEAD WITH & W/O-POST PROCESS   Final Result       Findings consistent with thrombus within the distal right M1 segment with attenuation of flow within the right sylvian artery branches.   Remainder of the circulation appears patent.   Right cerebral edema. No acute hemorrhage.       CT-CTA NECK WITH & W/O-POST PROCESSING   Final Result       CT angiogram of the neck within normal limits.       CT-HEAD W/O   Final Result       Suspect right cerebral edema with mild right-sided mass effect and 3 mm right to left midline shift.   No acute intracranial hemorrhage.   No hydrocephalus.       CT-CEREBRAL PERFUSION ANALYSIS   Final Result       1.  CT perfusion examination over the limited section of brain reveals 11 mL of brain parenchyma has less than 30% of cerebral blood flow (CBF).       2.  Please note that the cerebral perfusion was performed on the limited brain tissue around the basal ganglia region. Infarct/ischemia outside the CT perfusion sections can be missed in this study.       DX-CHEST-PORTABLE (1 VIEW)   Final Result       Endotracheal tube  terminates above the lefty. Enteric tube projects over the stomach.   Mild lung base atelectasis.       OUTSIDE IMAGES-CT HEAD   Final Result       OUTSIDE IMAGES-DX CHEST   Final Result             Assessment/Plan      * Stroke (HCC)- (present on admission)   Assessment & Plan     Right sided MCA  Loop recorder placed  Status post right M1 thrombectomy  Continue aspirin and statin  No afib captured, however embolic etiology highly suspected, has PFO, cardiology has recommended full anticoagulation to be started 2 weeks after acute event ( this would be 9/20/18 will need a repeat CT of head prior to initiation)  Acute rehab pending insurance auth          Cerebral edema (HCC)- (present on admission)   Assessment & Plan        Continue clinical monitoring          Dysphagia as late effect of cerebrovascular accident (CVA)   Assessment & Plan     Speech following  Dysphagia 1 with nectar thick  cortrack now out          Cardiomyopathy (HCC)   Assessment & Plan     Appreciate cardiology assistance  On low-dose aspirin  Continue statin  Continue carvedilol  Plan is for anticoagulation 2 weeks post stroke if remains stable                      Functional Status/ Changes:     Speech Language Therapy dysphagia treatment completed.   Functional Status:  Patient seen for swallowing therapy this date during breakfast meal. Patient awake, alert, sitting up in chair and agreeable to therapy.  He needed minimal cues to take small bites/sips and to slow down.  He was able to state that he needed to complete double swallow, but needed min cues for follow through.  He had inconsistent wet voice and throat clearing following swallows of purees and nectars.  On PO trials of soft solids, patient had we voice ~50% of the time and had coughing episode X1.  At this time, would recommend continuation of dysphagia I diet with NTL with 1:1 supervision. Worked with patient on swallowing exercises with emphasis on base of tongue retraction  "and laryngeal elevation.  Patient needed moderate cues to follow exercises as he kept stating that he did not need to do them.  He was able to complete 10 reps of each exercise with \"good\" accuracy.  Written instructions were provided to patient.  SLP will continue to follow for dysphagia therapy and for cognitive linguistic evaluation.  Patient would benefit from aggressive PT/OT/SLP services during acute care stay and following DC from acute in order to address deficits and maximize function.       Recommendations - Diet: Dysphagia I diet with NECTAR THICK LIQUIDS ONLY                          Strategies: Direct supervision during meals, No Straws and Head of Bed at 90 degrees/UP IN CHAIR PREFERRED for all PO intake and for 30 minutes following                          Medication Administration: crush in APPLESAUCE      Plan of Care: Will benefit from Speech Therapy 5 times per week     Post-Acute Therapy: Discharge to a transitional care facility for aggressive therapies to maximize function    Occupational Therapy Treatment completed with focus on ADLs, ADL transfers and patient education.  Functional Status:  Pt seen for OT tx. Min A supine > sit, min A sit > stand. Demonstrated full ROM on L UE but limited by weakness. Mod A for LB dressing. Min A amb in room w/ FWW for FWW management and safety. Pt verbalized that he wanted to go home and return back to [facility]. Pt returned BTB w/ SBA. Pt initially lethargic and required cues to maintain arousal but once EOB pt more awake and following commands. Pt w/ poor safety awareness and poor insight to deficits.    Plan of Care: Will benefit from Occupational Therapy 5 times per week  Discharge Recommendations:  Equipment Will Continue to Assess for Equipment Needs.     Physical Therapy Treatment completed.   Bed Mobility:  Supine to Sit: Minimal Assist  Transfers: Sit to Stand: Minimal Assist  Gait: Level Of Assist: Minimal Assist with Front-Wheel Walker       Plan of " "Care: Will benefit from Physical Therapy 5 times per week  Discharge Recommendations: Equipment: Will Continue to Assess for Equipment Needs. Post-acute therapy Discharge to a transitional care facility for continued skilled therapy services.      See \"Rehab Therapy-Acute\" Patient Summary Report for complete documentation.      Pt is s/p R MCA infarct with thrombectomy. Pt lethargic at first and required extra time for arousal. Pt able to get to EOB with cues for sequencing and Anastacia utilizing bed features. Pt able to stay awake during tx session and follows commands. Pt required Anastacia for sit to stand transfers at this time with fww. Pt was able to increase gait distance but continues to be limited by fatigue and weakness. He required Anastacia for gait with fww and demonstrated slight lateral lean but no overt LOB. Pt was independent prior to admission therefore would benefit from continued skilled therapy to address impairments.     Reviewer: Jerald Masters L.P.N.  Date: 9/14/2018  Time: 9:23 AM  "

## 2018-09-12 NOTE — DISCHARGE SUMMARY
Discharge Summary    CHIEF COMPLAINT ON ADMISSION  Chief Complaint   Patient presents with   • Possible Stroke       Reason for Admission  EMS     CODE STATUS  Full Code    HPI & HOSPITAL COURSE  This is a 53 y.o. male here with acute left sided weakness due to an acute MCA stroke with hemorrhagic conversion.  He underwent a partial thrombectomy on 9/6/18 by interventional radiology.  He did require intubation due to inability to protect his airway but his respiratory status is now stable.  He was followed by both neurology and cardiology and was found to have a PFO.  A loop recorder was placed.  Although afib/flutter has not been captured, there is high suspicion that etiology was embolic and cardiology has recommended that full anticoagulation be initiated two weeks after acute event (which would be 9/20/18). Neurology recommended that at least a two week period be given due to the hemorrhage and that a repeat CT head be obtained prior to starting the full anticoagulation.  In the interim he will continue on aspirin and high dose statin.  He will also need to follow up with [facility] cardiology as an outpatient.      His dysphagia is improving, speech therapy advanced him on 9/11/18 to a dysphagia 1 diet with nectar thick liquids.  He will require ongoing speech therapy and re evaluation.  His left sided weakness has persisted.    Therefore, he is discharged in fair and stable condition to an acute rehab hospital.    The patient met 2-midnight criteria for an inpatient stay at the time of discharge.      FOLLOW UP ITEMS POST DISCHARGE  Cardiology  Neurology    DISCHARGE DIAGNOSES  Principal Problem:    Stroke (HCC) POA: Yes  Active Problems:    Cerebral edema (HCC) POA: Yes    Cardiomyopathy (HCC) POA: Unknown    Dysphagia as late effect of cerebrovascular accident (CVA) POA: Unknown  Resolved Problems:    Acute respiratory failure with hypoxia (HCC) POA: Yes    Swelling of lower extremity POA: Yes    Hypokalemia  POA: Unknown      FOLLOW UP  No future appointments.  No follow-up provider specified.    MEDICATIONS ON DISCHARGE     Medication List      START taking these medications      Instructions   aspirin 81 MG Chew chewable tablet  Commonly known as:  ASA   1 Tab by Per NG Tube route every day.  Dose:  81 mg     atorvastatin 80 MG tablet  Commonly known as:  LIPITOR   1 Tab by Per NG Tube route every evening.  Dose:  80 mg     carvedilol 3.125 MG Tabs  Commonly known as:  COREG   Doctor's comments:  Hold sbp less 110 or hr less 55  Take 1 Tab by mouth 2 times a day, with meals.  Dose:  3.125 mg     dextrose 50% 50 % Soln  Commonly known as:  D50W   25 mL by Intravenous route as needed (If FSBG is less than or equal to 70 mg/dL and If patient is NPO).  Dose:  25 mL     insulin regular 100 Unit/mL Soln  Commonly known as:  HUMULIN R   Inject 2-9 Units as instructed every 6 hours.  Dose:  2-9 Units            Allergies  No Known Allergies    DIET  Orders Placed This Encounter   Procedures   • Diet Order Regular     Standing Status:   Standing     Number of Occurrences:   1     Order Specific Question:   Diet:     Answer:   Regular [1]     Order Specific Question:   Texture/Fiber modifications:     Answer:   Dysphagia 1(Pureed)specify fluid consistency(question 6) [1]     Order Specific Question:   Consistency/Fluid modifications:     Answer:   Nectar Thick [2]     Order Specific Question:   Miscellaneous modifications:     Answer:   SLP - 1:1 Supervision by Nursing [21]     Order Specific Question:   Miscellaneous modifications:     Answer:   SLP - Deliver to Nursing Station [22]       ACTIVITY  As tolerated.    LINES, DRAINS, AND WOUNDS  This is an automated list. Peripheral IVs will be removed prior to discharge.  PIV Group Right Forearm 20g Flexible Catheter (Active)   Line Secured Taped;Transparent 9/12/2018  9:00 AM   Site Condition / Description Assessed;Patent;Clean;Dry;Intact 9/12/2018  9:00 AM   Dressing Type /  Description Transparent;Clean;Dry;Intact 9/12/2018  9:00 AM   Dressing Status Observed 9/12/2018  9:00 AM   Saline Locked Yes 9/12/2018  9:00 AM   Infiltration Grading Used by [facility] and [facility] 0 9/12/2018  9:00 AM   Phlebitis Scale (Used by [facility]) 0 9/12/2018  9:00 AM     Enteral Tube 10 fr Left Nare Cortrak Gastric Feeding Tube (Active)   Placement Confirmation X-Ray 9/12/2018  8:36 AM   Method of Securement Tape 9/12/2018  8:36 AM   Tube Secured at (cm) 65 9/11/2018  8:00 AM   Position Left 9/12/2018  8:36 AM   Tube Care Completed 9/12/2018  8:36 AM   Next Closed Access Valve Change Due 09/15/18 9/12/2018  8:36 AM   Head of Bed up 30º While Tube Feeding Yes 9/12/2018  8:36 AM   Site Condition / Description Assessed;Clean;Dry ;Intact 9/12/2018  8:36 AM   Dressing Type / Description Open to Air 9/11/2018  8:00 AM   Dressing Status Observed 9/11/2018  8:00 AM   Intake (mL) 220 mL 9/11/2018  4:00 PM                    MENTAL STATUS ON TRANSFER  Level of Consciousness: Alert  Orientation : Disoriented to Event  Speech: Speech Clear    CONSULTATIONS  Critical care  Cardiology  Neurology  IR    PROCEDURES  DX-ABDOMEN FOR TUBE PLACEMENT   Final Result      Feeding tube tip at the distal stomach.      DX-CHEST-PORTABLE (1 VIEW)   Final Result      Unchanged bibasilar atelectasis and/or airspace disease      [id number] VIEW   Final Result      Enteric tube tip projects over the gastric antrum                  DX-ABDOMEN FOR TUBE PLACEMENT   Final Result         Feeding tube with tip projecting over the expected area of the stomach fundus.      DX-CHEST-PORTABLE (1 VIEW)   Final Result         1.  Pulmonary edema and/or infiltrates.      CT-HEAD W/O   Final Result         1. Evolving right MCA territory infarct with slight interval increase in local mass effect from local edema. Effacement of the right frontal horn. No hydrocephalus.   2. Stable hemorrhagic products within the infarct. No progressive parenchymal  hemorrhage.      MR-BRAIN-W/O   Final Result      1.  Moderate size acute right MCA territory infarct with evidence of hemorrhagic conversion.   2.  6 mm of right-to-left midline shift with effacement of the right lateral and third ventricles. No evidence of hydrocephalus.   3.  Findings of sinusitis as described above.   4.  Trace bilateral mastoid effusions. Findings could be consistent with mastoiditis in the appropriate clinical setting.      Attempts to convey these findings to Dr. PASTOR CLAYTON were initiated on 9/8/2018 2:03 PM. These findings were discussed with PASTOR CLAYTON on 9/8/2018 2:21 PM.      DX-CHEST-PORTABLE (1 VIEW)   Final Result         1.  Pulmonary edema and/or infiltrates are identified, which are stable since the prior exam.      ECHOCARDIOGRAM-COMP W/ CONT   Final Result      DX-ABDOMEN FOR TUBE PLACEMENT   Final Result      Enteric tube projects over the distal stomach/proximal duodenum.         DX-CHEST-PORTABLE (1 VIEW)   Final Result         1.  Mild pulmonary edema and/or infiltrates.      LE VENOUS DUPLEX   Final Result      IR-THROMBECTOMY ARTERY SECONDARY   Final Result      1. Subtotal occlusion of the right M1 segment.      2.  Successful cerebral thrombectomy performed with post intervention   angiogram demonstrating  patent patent right M1 segment and M2 and M3 branches, with a persistent occlusion of a distal small posterior division M2 branch            CT-CTA HEAD WITH & W/O-POST PROCESS   Final Result      Findings consistent with thrombus within the distal right M1 segment with attenuation of flow within the right sylvian artery branches.   Remainder of the circulation appears patent.   Right cerebral edema. No acute hemorrhage.      CT-CTA NECK WITH & W/O-POST PROCESSING   Final Result      CT angiogram of the neck within normal limits.      CT-HEAD W/O   Final Result      Suspect right cerebral edema with mild right-sided mass effect and 3 mm right to left midline shift.    No acute intracranial hemorrhage.   No hydrocephalus.      CT-CEREBRAL PERFUSION ANALYSIS   Final Result      1.  CT perfusion examination over the limited section of brain reveals 11 mL of brain parenchyma has less than 30% of cerebral blood flow (CBF).      2.  Please note that the cerebral perfusion was performed on the limited brain tissue around the basal ganglia region. Infarct/ischemia outside the CT perfusion sections can be missed in this study.      DX-CHEST-PORTABLE (1 VIEW)   Final Result      Endotracheal tube terminates above the lefty. Enteric tube projects over the stomach.   Mild lung base atelectasis.      OUTSIDE IMAGES-CT HEAD   Final Result      OUTSIDE IMAGES-DX CHEST   Final Result            LABORATORY  Lab Results   Component Value Date    SODIUM 140 09/12/2018    POTASSIUM 3.6 09/12/2018    CHLORIDE 107 09/12/2018    CO2 24 09/12/2018    GLUCOSE 101 (H) 09/12/2018    BUN 25 (H) 09/12/2018    CREATININE 0.77 09/12/2018        Lab Results   Component Value Date    WBC 9.0 09/12/2018    HEMOGLOBIN 15.1 09/12/2018    HEMATOCRIT 45.6 09/12/2018    PLATELETCT 352 09/12/2018        Total time of the discharge process exceeds 45 minutes.

## 2018-09-12 NOTE — THERAPY
"Occupational Therapy Treatment completed with focus on ADLs, ADL transfers and patient education.  Functional Status:  Pt seen for OT tx. Min A supine > sit, min A sit > stand. Demonstrated full ROM on L UE but limited by weakness. Mod A for LB dressing. Min A amb in room w/ FWW for FWW management and safety. Pt verbalized that he wanted to go home and return back to PLOF. Pt returned BTB w/ SBA. Pt initially lethargic and required cues to maintain arousal but once EOB pt more awake and following commands. Pt w/ poor safety awareness and poor insight to deficits.    Plan of Care: Will benefit from Occupational Therapy 5 times per week  Discharge Recommendations:  Equipment Will Continue to Assess for Equipment Needs.     See \"Rehab Therapy-Acute\" Patient Summary Report for complete documentation.   "

## 2018-09-12 NOTE — THERAPY
"Physical Therapy Treatment completed.   Bed Mobility:  Supine to Sit: Minimal Assist  Transfers: Sit to Stand: Minimal Assist  Gait: Level Of Assist: Minimal Assist with Front-Wheel Walker       Plan of Care: Will benefit from Physical Therapy 5 times per week  Discharge Recommendations: Equipment: Will Continue to Assess for Equipment Needs. Post-acute therapy Discharge to a transitional care facility for continued skilled therapy services.     See \"Rehab Therapy-Acute\" Patient Summary Report for complete documentation.     Pt is s/p R MCA infarct with thrombectomy. Pt lethargic at first and required extra time for arousal. Pt able to get to EOB with cues for sequencing and Anastacia utilizing bed features. Pt able to stay awake during tx session and follows commands. Pt required Anastacia for sit to stand transfers at this time with fww. Pt was able to increase gait distance but continues to be limited by fatigue and weakness. He required Anastacia for gait with fww and demonstrated slight lateral lean but no overt LOB. Pt was independent prior to admission therefore would benefit from continued skilled therapy to address impairments.   "

## 2018-09-12 NOTE — DISCHARGE PLANNING
TCC spoke with Ignacio, son regarding RIRF.  He is agreeable.  Awaiting insurance info to look into post acute benefits.

## 2018-09-12 NOTE — PROGRESS NOTES
"A/Ox3 disoriented to event. Medicated as needed for pain medication with relief. Medications taken with applesauce, aspiration precautions maintained. Good UOP overnight. Had one xlarge soft BM for shift. Cortrack in place. Will continue to monitor. /86   Pulse 73   Temp 36.4 °C (97.6 °F)   Resp 20   Ht 1.803 m (5' 10.98\")   Wt 114.9 kg (253 lb 4.9 oz)   SpO2 93%   BMI 35.35 kg/m²     "

## 2018-09-12 NOTE — PROGRESS NOTES
Cortrack line was pulled out correction, certified RN advanced tubing and secured at nose. Stat abd xray ordered to verify placement.Per pt, cortrack was pulled out by clothing.

## 2018-09-12 NOTE — DIETARY
Nutrition Services: Update   Pt was previously on TF. TF was d/c yesterday. Pt is currently on regular, dysphagia 1 diet with nectar thick liquids and 1:1 supervision. Per chart pt PO 50-75% 3 meals documented. Pt is eating well. Wt 9/11: 114.9 kg via bed scale - wt decreased since admit suspect related to fluids. Per I/Os pt -1.3 L.    Recommendations/Plan:  1. Diet upgrades per SLP.   2. Encourage intake of meals  3. Document intake of all meals as % taken in ADL's to provide interdisciplinary communication across all shifts.   4. Monitor weight.  5. Nutrition rep will continue to see patient for ongoing meal and snack preferences.  6. Obtain supplement order per RD as needed.    RD available prn

## 2018-09-12 NOTE — CARE PLAN
Problem: Skin Integrity  Goal: Risk for impaired skin integrity will decrease  Outcome: PROGRESSING AS EXPECTED  Pt's skin remains intact. Left chest loop recorder site ROLAND with no drainage present and no signs of infection. Right groin site with gauze, clean, dry and intact. Pt assisted with repositioning in bed. Will continue to monitor.     Problem: Pain Management  Goal: Pain level will decrease to patient's comfort goal  Outcome: PROGRESSING AS EXPECTED  Pt medicated as needed. Upon reassessment pt sleeping. Will continue to monitor and medicate as needed.

## 2018-09-12 NOTE — CARE PLAN
Problem: Infection  Goal: Will remain free from infection  Outcome: PROGRESSING AS EXPECTED  No s/s of infection.

## 2018-09-12 NOTE — PROGRESS NOTES
Monitor summary: SR 68-84, MI 0.20, QRS 0.08, QT 0.32, with occasioanal PVCs and rare triplets per strip from monitor room.

## 2018-09-12 NOTE — PROGRESS NOTES
[facility] Medicine Daily Progress Note    Date of Service  9/12/2018    Chief Complaint  53 y.o. male admitted 9/6/2018 with acute left sided weakness due to an acute mca stroke with hemorrhagic conversion.        Hospital Course  He did require intubation but has now been successfully extubated.  He also underwent a partial thrombectomy.    Interval Problem Update  Stable L weakness, axox3, denies pain  Following commands  Ros otherwise negative    Consultants/Specialty  PMR  Neurology  Cardiology  Critical Care    Disposition  Acute rehab pending, delays due to insurance    Review of Systems  Review of Systems   Constitutional: Negative.  Negative for chills, diaphoresis, fever, malaise/fatigue and weight loss.   HENT: Negative.  Negative for sore throat.    Eyes: Negative.  Negative for blurred vision.   Respiratory: Negative.  Negative for cough and shortness of breath.    Cardiovascular: Negative.  Negative for chest pain, palpitations and leg swelling.   Gastrointestinal: Negative.  Negative for abdominal pain, nausea and vomiting.        Dysphagia   Genitourinary: Negative.  Negative for dysuria.   Musculoskeletal: Negative.  Negative for myalgias.   Skin: Negative.  Negative for itching and rash.   Neurological: Positive for sensory change and speech change. Negative for dizziness, focal weakness and weakness.   Endo/Heme/Allergies: Negative.  Does not bruise/bleed easily.   Psychiatric/Behavioral: Negative.  Negative for depression, substance abuse and suicidal ideas.   All other systems reviewed and are negative.       Physical Exam  Blood Pressure: 158/82   Temperature: 36.8 °C (98.3 °F)   Pulse: 72   Respiration: 15   Pulse Oximetry: 99 %     Physical Exam   Constitutional: He is oriented to person, place, and time. He appears well-developed and well-nourished. No distress.   HENT:   Head: Normocephalic and atraumatic.   Mouth/Throat: Oropharynx is clear and moist.   cortrack in place   Eyes: Conjunctivae  are normal. Right eye exhibits no discharge.   Cardiovascular: Normal rate, regular rhythm, normal heart sounds and intact distal pulses.  Exam reveals no gallop and no friction rub.    No murmur heard.  Pulmonary/Chest: Effort normal and breath sounds normal. No respiratory distress. He has no wheezes. He has no rales. He exhibits no tenderness.   Abdominal: Soft. Bowel sounds are normal. He exhibits no distension and no mass. There is no tenderness. There is no rebound and no guarding.   Musculoskeletal: Normal range of motion. He exhibits no edema, tenderness or deformity.   Neurological: He is alert and oriented to person, place, and time. He has normal reflexes. No cranial nerve deficit. He exhibits normal muscle tone. Coordination normal.   Mild l weakness   Skin: Skin is warm and dry. No rash noted. He is not diaphoretic. No erythema. No pallor.   Psychiatric: He has a normal mood and affect. His behavior is normal. Judgment and thought content normal.   Nursing note and vitals reviewed.      Fluids    Intake/Output Summary (Last 24 hours) at 09/12/18 1526  Last data filed at 09/12/18 0836   Gross per 24 hour   Intake              880 ml   Output              850 ml   Net               30 ml       Laboratory  Recent Labs      09/10/18   0251  09/11/18   0500  09/12/18   0501   WBC  7.6  9.0  9.0   RBC  5.00  5.24  5.31   HEMOGLOBIN  14.8  15.3  15.1   HEMATOCRIT  43.4  44.3  45.6   MCV  86.8  84.5  85.9   MCH  29.6  29.2  28.4   MCHC  34.1  34.5  33.1*   RDW  42.3  41.8  43.2   PLATELETCT  300  332  352   MPV  9.6  9.8  9.8     Recent Labs      09/10/18   0251  09/11/18   0500  09/12/18   0501   SODIUM  140  140  140   POTASSIUM  3.6  3.6  3.6   CHLORIDE  105  107  107   CO2  26  21  24   GLUCOSE  95  107*  101*   BUN  18  26*  25*   CREATININE  0.76  0.76  0.77   CALCIUM  9.5  9.7  9.8                   Imaging  DX-ABDOMEN FOR TUBE PLACEMENT   Final Result      Feeding tube tip at the distal stomach.       DX-CHEST-PORTABLE (1 VIEW)   Final Result      Unchanged bibasilar atelectasis and/or airspace disease      [id number] VIEW   Final Result      Enteric tube tip projects over the gastric antrum                  DX-ABDOMEN FOR TUBE PLACEMENT   Final Result         Feeding tube with tip projecting over the expected area of the stomach fundus.      DX-CHEST-PORTABLE (1 VIEW)   Final Result         1.  Pulmonary edema and/or infiltrates.      CT-HEAD W/O   Final Result         1. Evolving right MCA territory infarct with slight interval increase in local mass effect from local edema. Effacement of the right frontal horn. No hydrocephalus.   2. Stable hemorrhagic products within the infarct. No progressive parenchymal hemorrhage.      MR-BRAIN-W/O   Final Result      1.  Moderate size acute right MCA territory infarct with evidence of hemorrhagic conversion.   2.  6 mm of right-to-left midline shift with effacement of the right lateral and third ventricles. No evidence of hydrocephalus.   3.  Findings of sinusitis as described above.   4.  Trace bilateral mastoid effusions. Findings could be consistent with mastoiditis in the appropriate clinical setting.      Attempts to convey these findings to Dr. PASTOR CLAYTON were initiated on 9/8/2018 2:03 PM. These findings were discussed with PASTOR CLAYTON on 9/8/2018 2:21 PM.      DX-CHEST-PORTABLE (1 VIEW)   Final Result         1.  Pulmonary edema and/or infiltrates are identified, which are stable since the prior exam.      ECHOCARDIOGRAM-COMP W/ CONT   Final Result      DX-ABDOMEN FOR TUBE PLACEMENT   Final Result      Enteric tube projects over the distal stomach/proximal duodenum.         DX-CHEST-PORTABLE (1 VIEW)   Final Result         1.  Mild pulmonary edema and/or infiltrates.      LE VENOUS DUPLEX   Final Result      IR-THROMBECTOMY ARTERY SECONDARY   Final Result      1. Subtotal occlusion of the right M1 segment.      2.  Successful cerebral thrombectomy  performed with post intervention   angiogram demonstrating  patent patent right M1 segment and M2 and M3 branches, with a persistent occlusion of a distal small posterior division M2 branch            CT-CTA HEAD WITH & W/O-POST PROCESS   Final Result      Findings consistent with thrombus within the distal right M1 segment with attenuation of flow within the right sylvian artery branches.   Remainder of the circulation appears patent.   Right cerebral edema. No acute hemorrhage.      CT-CTA NECK WITH & W/O-POST PROCESSING   Final Result      CT angiogram of the neck within normal limits.      CT-HEAD W/O   Final Result      Suspect right cerebral edema with mild right-sided mass effect and 3 mm right to left midline shift.   No acute intracranial hemorrhage.   No hydrocephalus.      CT-CEREBRAL PERFUSION ANALYSIS   Final Result      1.  CT perfusion examination over the limited section of brain reveals 11 mL of brain parenchyma has less than 30% of cerebral blood flow (CBF).      2.  Please note that the cerebral perfusion was performed on the limited brain tissue around the basal ganglia region. Infarct/ischemia outside the CT perfusion sections can be missed in this study.      DX-CHEST-PORTABLE (1 VIEW)   Final Result      Endotracheal tube terminates above the lefty. Enteric tube projects over the stomach.   Mild lung base atelectasis.      OUTSIDE IMAGES-CT HEAD   Final Result      OUTSIDE IMAGES-DX CHEST   Final Result           Assessment/Plan  * Stroke (HCC)- (present on admission)   Assessment & Plan    Right sided MCA  Loop recorder placed, POD #2  Status post right M1 thrombectomy  Continue aspirin and statin  No afib captured, however embolic etiology highly suspected, has PFO, cardiology has recommended full anticoagulation to be started 2 weeks after acute event ( this would be 9/20/18 will need a repeat CT of head prior to initiation)  Continue PT/OT/SLP  Acute rehab pending insurance auth         Cerebral edema (HCC)- (present on admission)   Assessment & Plan    Jose  Continue clinical monitoring        Acute respiratory failure with hypoxia (HCC)- (present on admission)   Assessment & Plan    Intubated on 9/6/2018   extubated 9/8/2018  Continue aspiration precautions   RT protocol           Dysphagia as late effect of cerebrovascular accident (CVA)   Assessment & Plan    Speech following  Dysphagia 1 with nectar thick  cortrack in place - will likely remove in 1-2 days as oral intake increases        Hypokalemia   Assessment & Plan    Resolved        Cardiomyopathy (HCC)   Assessment & Plan    Appreciate cardiology assistance  On low-dose aspirin  Continue statin  Continue carvedilol  Plan is for anticoagulation 2 weeks post stroke if remains stable          Swelling of lower extremity- (present on admission)   Assessment & Plan      Improved  Lower extremity duplex negative for DVT

## 2018-09-13 LAB
ANION GAP SERPL CALC-SCNC: 9 MMOL/L (ref 0–11.9)
BASOPHILS # BLD AUTO: 1.5 % (ref 0–1.8)
BASOPHILS # BLD: 0.14 K/UL (ref 0–0.12)
BUN SERPL-MCNC: 22 MG/DL (ref 8–22)
CALCIUM SERPL-MCNC: 8.8 MG/DL (ref 8.5–10.5)
CHLORIDE SERPL-SCNC: 105 MMOL/L (ref 96–112)
CO2 SERPL-SCNC: 23 MMOL/L (ref 20–33)
CREAT SERPL-MCNC: 0.88 MG/DL (ref 0.5–1.4)
EOSINOPHIL # BLD AUTO: 0.55 K/UL (ref 0–0.51)
EOSINOPHIL NFR BLD: 6 % (ref 0–6.9)
ERYTHROCYTE [DISTWIDTH] IN BLOOD BY AUTOMATED COUNT: 43.3 FL (ref 35.9–50)
GLUCOSE BLD-MCNC: 121 MG/DL (ref 65–99)
GLUCOSE BLD-MCNC: 99 MG/DL (ref 65–99)
GLUCOSE SERPL-MCNC: 105 MG/DL (ref 65–99)
HCT VFR BLD AUTO: 45.5 % (ref 42–52)
HGB BLD-MCNC: 15 G/DL (ref 14–18)
IMM GRANULOCYTES # BLD AUTO: 0.1 K/UL (ref 0–0.11)
IMM GRANULOCYTES NFR BLD AUTO: 1.1 % (ref 0–0.9)
LYMPHOCYTES # BLD AUTO: 1.05 K/UL (ref 1–4.8)
LYMPHOCYTES NFR BLD: 11.4 % (ref 22–41)
MCH RBC QN AUTO: 28.5 PG (ref 27–33)
MCHC RBC AUTO-ENTMCNC: 33 G/DL (ref 33.7–35.3)
MCV RBC AUTO: 86.5 FL (ref 81.4–97.8)
MONOCYTES # BLD AUTO: 0.9 K/UL (ref 0–0.85)
MONOCYTES NFR BLD AUTO: 9.8 % (ref 0–13.4)
NEUTROPHILS # BLD AUTO: 6.45 K/UL (ref 1.82–7.42)
NEUTROPHILS NFR BLD: 70.2 % (ref 44–72)
NRBC # BLD AUTO: 0 K/UL
NRBC BLD-RTO: 0 /100 WBC
PLATELET # BLD AUTO: 312 K/UL (ref 164–446)
PMV BLD AUTO: 10 FL (ref 9–12.9)
POTASSIUM SERPL-SCNC: 3.9 MMOL/L (ref 3.6–5.5)
RBC # BLD AUTO: 5.26 M/UL (ref 4.7–6.1)
SODIUM SERPL-SCNC: 137 MMOL/L (ref 135–145)
WBC # BLD AUTO: 9.2 K/UL (ref 4.8–10.8)

## 2018-09-13 PROCEDURE — A9270 NON-COVERED ITEM OR SERVICE: HCPCS | Performed by: PSYCHIATRY & NEUROLOGY

## 2018-09-13 PROCEDURE — 770006 HCHG ROOM/CARE - MED/SURG/GYN SEMI*

## 2018-09-13 PROCEDURE — 82962 GLUCOSE BLOOD TEST: CPT | Mod: 91

## 2018-09-13 PROCEDURE — A9270 NON-COVERED ITEM OR SERVICE: HCPCS | Performed by: INTERNAL MEDICINE

## 2018-09-13 PROCEDURE — 700102 HCHG RX REV CODE 250 W/ 637 OVERRIDE(OP): Performed by: HOSPITALIST

## 2018-09-13 PROCEDURE — 85025 COMPLETE CBC W/AUTO DIFF WBC: CPT

## 2018-09-13 PROCEDURE — 99232 SBSQ HOSP IP/OBS MODERATE 35: CPT | Performed by: HOSPITALIST

## 2018-09-13 PROCEDURE — 700102 HCHG RX REV CODE 250 W/ 637 OVERRIDE(OP): Performed by: PSYCHIATRY & NEUROLOGY

## 2018-09-13 PROCEDURE — A9270 NON-COVERED ITEM OR SERVICE: HCPCS | Performed by: HOSPITALIST

## 2018-09-13 PROCEDURE — 700102 HCHG RX REV CODE 250 W/ 637 OVERRIDE(OP): Performed by: INTERNAL MEDICINE

## 2018-09-13 PROCEDURE — 36415 COLL VENOUS BLD VENIPUNCTURE: CPT

## 2018-09-13 PROCEDURE — 92526 ORAL FUNCTION THERAPY: CPT

## 2018-09-13 PROCEDURE — 80048 BASIC METABOLIC PNL TOTAL CA: CPT

## 2018-09-13 RX ADMIN — SENNOSIDES AND DOCUSATE SODIUM 2 TABLET: 8.6; 5 TABLET ORAL at 17:19

## 2018-09-13 RX ADMIN — CARVEDILOL 3.12 MG: 6.25 TABLET, FILM COATED ORAL at 17:19

## 2018-09-13 RX ADMIN — ACETAMINOPHEN 650 MG: 325 TABLET, FILM COATED ORAL at 15:39

## 2018-09-13 RX ADMIN — ASPIRIN 81 MG: 81 TABLET, CHEWABLE ORAL at 04:52

## 2018-09-13 RX ADMIN — OXYCODONE HYDROCHLORIDE 10 MG: 10 TABLET ORAL at 12:35

## 2018-09-13 RX ADMIN — SENNOSIDES AND DOCUSATE SODIUM 2 TABLET: 8.6; 5 TABLET ORAL at 04:52

## 2018-09-13 RX ADMIN — OXYCODONE HYDROCHLORIDE 10 MG: 10 TABLET ORAL at 17:19

## 2018-09-13 RX ADMIN — ACETAMINOPHEN 650 MG: 325 TABLET, FILM COATED ORAL at 21:34

## 2018-09-13 RX ADMIN — OXYCODONE HYDROCHLORIDE 10 MG: 10 TABLET ORAL at 03:57

## 2018-09-13 RX ADMIN — OXYCODONE HYDROCHLORIDE 10 MG: 10 TABLET ORAL at 08:01

## 2018-09-13 RX ADMIN — CARVEDILOL 3.12 MG: 6.25 TABLET, FILM COATED ORAL at 08:01

## 2018-09-13 RX ADMIN — ATORVASTATIN CALCIUM 80 MG: 80 TABLET, FILM COATED ORAL at 17:19

## 2018-09-13 ASSESSMENT — PAIN SCALES - GENERAL
PAINLEVEL_OUTOF10: 10
PAINLEVEL_OUTOF10: 9
PAINLEVEL_OUTOF10: 9
PAINLEVEL_OUTOF10: 10
PAINLEVEL_OUTOF10: 8
PAINLEVEL_OUTOF10: 10
PAINLEVEL_OUTOF10: 5

## 2018-09-13 ASSESSMENT — ENCOUNTER SYMPTOMS
BLURRED VISION: 0
SHORTNESS OF BREATH: 0
DEPRESSION: 0
ABDOMINAL PAIN: 0
DIAPHORESIS: 0
MYALGIAS: 0
GASTROINTESTINAL NEGATIVE: 1
BRUISES/BLEEDS EASILY: 0
CHILLS: 0
SENSORY CHANGE: 1
DIZZINESS: 0
CONSTITUTIONAL NEGATIVE: 1
CARDIOVASCULAR NEGATIVE: 1
ROS GI COMMENTS: DYSPHAGIA
PALPITATIONS: 0
VOMITING: 0
RESPIRATORY NEGATIVE: 1
EYES NEGATIVE: 1
FOCAL WEAKNESS: 0
WEIGHT LOSS: 0
PSYCHIATRIC NEGATIVE: 1
SORE THROAT: 0
WEAKNESS: 0
MUSCULOSKELETAL NEGATIVE: 1
SPEECH CHANGE: 1
NAUSEA: 0
COUGH: 0
FEVER: 0

## 2018-09-13 ASSESSMENT — LIFESTYLE VARIABLES: SUBSTANCE_ABUSE: 0

## 2018-09-13 NOTE — PROGRESS NOTES
"Medicated as needed for pain. Upon reassessment pt sleeping. Condom cath in place. Neuro checks unchanged. Will continue to monitor. /79   Pulse 69   Temp 36.7 °C (98 °F)   Resp 18   Ht 1.803 m (5' 10.98\")   Wt 114.9 kg (253 lb 4.9 oz)   SpO2 92%   BMI 35.35 kg/m²   "

## 2018-09-13 NOTE — PROGRESS NOTES
Care of pt assumed at 0700. Pt oriented x3, unable to recall details of situation. Pt up with walker and assist of one. Pt was up to chair for all meals and ate 100% of all meals with supervision. Pt complains of back pain- medicated per MAR. Condom cath in place. Offered to remove condom cath and see if pt could use urinal, put pt refused. Cortrak in place for most of day and discontinued this evening. No issues throughout shift.

## 2018-09-13 NOTE — THERAPY
"Speech Language Therapy dysphagia treatment completed.   Functional Status:  Patient seen for swallowing therapy this date during breakfast meal. Patient awake, alert, sitting up in chair and agreeable to therapy.  He needed minimal cues to take small bites/sips and to slow down.  He was able to state that he needed to complete double swallow, but needed min cues for follow through.  He had inconsistent wet voice and throat clearing following swallows of purees and nectars.  On PO trials of soft solids, patient had we voice ~50% of the time and had coughing episode X1.  At this time, would recommend continuation of dysphagia I diet with NTL with 1:1 supervision. Worked with patient on swallowing exercises with emphasis on base of tongue retraction and laryngeal elevation.  Patient needed moderate cues to follow exercises as he kept stating that he did not need to do them.  He was able to complete 10 reps of each exercise with \"good\" accuracy.  Written instructions were provided to patient.  SLP will continue to follow for dysphagia therapy and for cognitive linguistic evaluation.  Patient would benefit from aggressive PT/OT/SLP services during acute care stay and following DC from acute in order to address deficits and maximize function.      Recommendations - Diet: Dysphagia I diet with NECTAR THICK LIQUIDS ONLY                          Strategies: Direct supervision during meals, No Straws and Head of Bed at 90 degrees/UP IN CHAIR PREFERRED for all PO intake and for 30 minutes following                          Medication Administration: crush in APPLESAUCE     Plan of Care: Will benefit from Speech Therapy 5 times per week    Post-Acute Therapy: Discharge to a transitional care facility for aggressive therapies to maximize function  See \"Rehab Therapy-Acute\" Patient Summary Report for complete documentation.     "

## 2018-09-13 NOTE — CARE PLAN
Problem: Skin Integrity  Goal: Skin Integrity is maintained or improved  Outcome: PROGRESSING AS EXPECTED  Pt assisted with repositioning throughout shift.     Problem: Pain Management  Goal: Pain level will decrease to patient's comfort goal  Outcome: PROGRESSING AS EXPECTED  Pt medicated as needed for lower back and headache. Upon reassessment pt sleeping. Will continue to monitor and medicate as needed.

## 2018-09-13 NOTE — PROGRESS NOTES
Hospital Medicine Daily Progress Note    Date of Service  9/13/2018    Chief Complaint  53 y.o. male admitted 9/6/2018 with acute left sided weakness due to an acute mca stroke with hemorrhagic conversion.        Hospital Course  He did require intubation but has now been successfully extubated.  He also underwent a partial thrombectomy.    Interval Problem Update  More alert this am  axox3  No change in L sided weakness  cortrack out as good oral intake, tolerating Dysphagia I diet    Consultants/Specialty  PMR  Neurology  Cardiology  Critical Care    Disposition  Acute rehab pending, still awaiting insurance authorization    Review of Systems  Review of Systems   Constitutional: Negative.  Negative for chills, diaphoresis, fever, malaise/fatigue and weight loss.   HENT: Negative.  Negative for sore throat.    Eyes: Negative.  Negative for blurred vision.   Respiratory: Negative.  Negative for cough and shortness of breath.    Cardiovascular: Negative.  Negative for chest pain, palpitations and leg swelling.   Gastrointestinal: Negative.  Negative for abdominal pain, nausea and vomiting.        Dysphagia   Genitourinary: Negative.  Negative for dysuria.   Musculoskeletal: Negative.  Negative for myalgias.   Skin: Negative.  Negative for itching and rash.   Neurological: Positive for sensory change and speech change. Negative for dizziness, focal weakness and weakness.   Endo/Heme/Allergies: Negative.  Does not bruise/bleed easily.   Psychiatric/Behavioral: Negative.  Negative for depression, substance abuse and suicidal ideas.   All other systems reviewed and are negative.       Physical Exam  Blood Pressure: 158/82   Temperature: 36.8 °C (98.3 °F)   Pulse: 72   Respiration: 15   Pulse Oximetry: 99 %     Physical Exam   Constitutional: He is oriented to person, place, and time. He appears well-developed and well-nourished. No distress.   HENT:   Head: Normocephalic and atraumatic.   Mouth/Throat: Oropharynx is  clear and moist.   cortrack in place   Eyes: Conjunctivae are normal. Right eye exhibits no discharge.   Cardiovascular: Normal rate, regular rhythm, normal heart sounds and intact distal pulses.  Exam reveals no gallop and no friction rub.    No murmur heard.  Pulmonary/Chest: Effort normal and breath sounds normal. No respiratory distress. He has no wheezes. He has no rales. He exhibits no tenderness.   Abdominal: Soft. Bowel sounds are normal. He exhibits no distension and no mass. There is no tenderness. There is no rebound and no guarding.   Musculoskeletal: Normal range of motion. He exhibits no edema, tenderness or deformity.   Neurological: He is alert and oriented to person, place, and time. He has normal reflexes. No cranial nerve deficit. He exhibits normal muscle tone. Coordination normal.   Mild l weakness   Skin: Skin is warm and dry. No rash noted. He is not diaphoretic. No erythema. No pallor.   Psychiatric: He has a normal mood and affect. His behavior is normal. Judgment and thought content normal.   Nursing note and vitals reviewed.      Fluids    Intake/Output Summary (Last 24 hours) at 09/13/18 1334  Last data filed at 09/13/18 0400   Gross per 24 hour   Intake              240 ml   Output             1500 ml   Net            -1260 ml       Laboratory  Recent Labs      09/11/18   0500  09/12/18   0501  09/13/18   0451   WBC  9.0  9.0  9.2   RBC  5.24  5.31  5.26   HEMOGLOBIN  15.3  15.1  15.0   HEMATOCRIT  44.3  45.6  45.5   MCV  84.5  85.9  86.5   MCH  29.2  28.4  28.5   MCHC  34.5  33.1*  33.0*   RDW  41.8  43.2  43.3   PLATELETCT  332  352  312   MPV  9.8  9.8  10.0     Recent Labs      09/11/18   0500  09/12/18   0501  09/13/18   0451   SODIUM  140  140  137   POTASSIUM  3.6  3.6  3.9   CHLORIDE  107  107  105   CO2  21  24  23   GLUCOSE  107*  101*  105*   BUN  26*  25*  22   CREATININE  0.76  0.77  0.88   CALCIUM  9.7  9.8  8.8                   Imaging  DX-ABDOMEN FOR TUBE PLACEMENT    Final Result      Feeding tube tip at the distal stomach.      DX-CHEST-PORTABLE (1 VIEW)   Final Result      Unchanged bibasilar atelectasis and/or airspace disease      [id number] VIEW   Final Result      Enteric tube tip projects over the gastric antrum                  DX-ABDOMEN FOR TUBE PLACEMENT   Final Result         Feeding tube with tip projecting over the expected area of the stomach fundus.      DX-CHEST-PORTABLE (1 VIEW)   Final Result         1.  Pulmonary edema and/or infiltrates.      CT-HEAD W/O   Final Result         1. Evolving right MCA territory infarct with slight interval increase in local mass effect from local edema. Effacement of the right frontal horn. No hydrocephalus.   2. Stable hemorrhagic products within the infarct. No progressive parenchymal hemorrhage.      MR-BRAIN-W/O   Final Result      1.  Moderate size acute right MCA territory infarct with evidence of hemorrhagic conversion.   2.  6 mm of right-to-left midline shift with effacement of the right lateral and third ventricles. No evidence of hydrocephalus.   3.  Findings of sinusitis as described above.   4.  Trace bilateral mastoid effusions. Findings could be consistent with mastoiditis in the appropriate clinical setting.      Attempts to convey these findings to Dr. PASTOR CLAYTON were initiated on 9/8/2018 2:03 PM. These findings were discussed with PASTOR CLAYTON on 9/8/2018 2:21 PM.      DX-CHEST-PORTABLE (1 VIEW)   Final Result         1.  Pulmonary edema and/or infiltrates are identified, which are stable since the prior exam.      ECHOCARDIOGRAM-COMP W/ CONT   Final Result      DX-ABDOMEN FOR TUBE PLACEMENT   Final Result      Enteric tube projects over the distal stomach/proximal duodenum.         DX-CHEST-PORTABLE (1 VIEW)   Final Result         1.  Mild pulmonary edema and/or infiltrates.      LE VENOUS DUPLEX   Final Result      IR-THROMBECTOMY ARTERY SECONDARY   Final Result      1. Subtotal occlusion of the  right M1 segment.      2.  Successful cerebral thrombectomy performed with post intervention   angiogram demonstrating  patent patent right M1 segment and M2 and M3 branches, with a persistent occlusion of a distal small posterior division M2 branch            CT-CTA HEAD WITH & W/O-POST PROCESS   Final Result      Findings consistent with thrombus within the distal right M1 segment with attenuation of flow within the right sylvian artery branches.   Remainder of the circulation appears patent.   Right cerebral edema. No acute hemorrhage.      CT-CTA NECK WITH & W/O-POST PROCESSING   Final Result      CT angiogram of the neck within normal limits.      CT-HEAD W/O   Final Result      Suspect right cerebral edema with mild right-sided mass effect and 3 mm right to left midline shift.   No acute intracranial hemorrhage.   No hydrocephalus.      CT-CEREBRAL PERFUSION ANALYSIS   Final Result      1.  CT perfusion examination over the limited section of brain reveals 11 mL of brain parenchyma has less than 30% of cerebral blood flow (CBF).      2.  Please note that the cerebral perfusion was performed on the limited brain tissue around the basal ganglia region. Infarct/ischemia outside the CT perfusion sections can be missed in this study.      DX-CHEST-PORTABLE (1 VIEW)   Final Result      Endotracheal tube terminates above the lefty. Enteric tube projects over the stomach.   Mild lung base atelectasis.      OUTSIDE IMAGES-CT HEAD   Final Result      OUTSIDE IMAGES-DX CHEST   Final Result           Assessment/Plan  * Stroke (HCC)- (present on admission)   Assessment & Plan    Right sided MCA  Loop recorder placed  Status post right M1 thrombectomy  Continue aspirin and statin  No afib captured, however embolic etiology highly suspected, has PFO, cardiology has recommended full anticoagulation to be started 2 weeks after acute event ( this would be 9/20/18 will need a repeat CT of head prior to initiation)  Acute rehab  pending insurance auth        Cerebral edema (HCC)- (present on admission)   Assessment & Plan      Continue clinical monitoring        Dysphagia as late effect of cerebrovascular accident (CVA)   Assessment & Plan    Speech following  Dysphagia 1 with nectar thick  cortrack now out        Cardiomyopathy (HCC)   Assessment & Plan    Appreciate cardiology assistance  On low-dose aspirin  Continue statin  Continue carvedilol  Plan is for anticoagulation 2 weeks post stroke if remains stable

## 2018-09-13 NOTE — DISCHARGE PLANNING
[facility] PAR notified of insurance.  Will look into post acute benefits and submit to insurance as requested by son.

## 2018-09-14 ENCOUNTER — APPOINTMENT (OUTPATIENT)
Dept: RADIOLOGY | Facility: MEDICAL CENTER | Age: 53
DRG: 023 | End: 2018-09-14
Attending: INTERNAL MEDICINE
Payer: COMMERCIAL

## 2018-09-14 ENCOUNTER — RESOLUTE PROFESSIONAL BILLING HOSPITAL PROF FEE (OUTPATIENT)
Dept: PHYSICAL MEDICINE AND REHAB | Facility: REHABILITATION | Age: 53
End: 2018-09-14
Payer: COMMERCIAL

## 2018-09-14 ENCOUNTER — HOSPITAL ENCOUNTER (INPATIENT)
Facility: REHABILITATION | Age: 53
LOS: 5 days | DRG: 056 | End: 2018-09-19
Attending: PHYSICAL MEDICINE & REHABILITATION | Admitting: PHYSICAL MEDICINE & REHABILITATION
Payer: COMMERCIAL

## 2018-09-14 VITALS
HEIGHT: 71 IN | WEIGHT: 260.58 LBS | OXYGEN SATURATION: 95 % | DIASTOLIC BLOOD PRESSURE: 64 MMHG | RESPIRATION RATE: 18 BRPM | TEMPERATURE: 98.3 F | BODY MASS INDEX: 36.48 KG/M2 | SYSTOLIC BLOOD PRESSURE: 108 MMHG | HEART RATE: 68 BPM

## 2018-09-14 LAB
ANION GAP SERPL CALC-SCNC: 9 MMOL/L (ref 0–11.9)
BASOPHILS # BLD AUTO: 1.5 % (ref 0–1.8)
BASOPHILS # BLD: 0.15 K/UL (ref 0–0.12)
BUN SERPL-MCNC: 20 MG/DL (ref 8–22)
CALCIUM SERPL-MCNC: 9.2 MG/DL (ref 8.5–10.5)
CHLORIDE SERPL-SCNC: 104 MMOL/L (ref 96–112)
CO2 SERPL-SCNC: 21 MMOL/L (ref 20–33)
CREAT SERPL-MCNC: 0.73 MG/DL (ref 0.5–1.4)
EOSINOPHIL # BLD AUTO: 0.56 K/UL (ref 0–0.51)
EOSINOPHIL NFR BLD: 5.7 % (ref 0–6.9)
ERYTHROCYTE [DISTWIDTH] IN BLOOD BY AUTOMATED COUNT: 41.3 FL (ref 35.9–50)
GLUCOSE SERPL-MCNC: 103 MG/DL (ref 65–99)
HCT VFR BLD AUTO: 46.8 % (ref 42–52)
HGB BLD-MCNC: 15.6 G/DL (ref 14–18)
IMM GRANULOCYTES # BLD AUTO: 0.11 K/UL (ref 0–0.11)
IMM GRANULOCYTES NFR BLD AUTO: 1.1 % (ref 0–0.9)
LYMPHOCYTES # BLD AUTO: 1.08 K/UL (ref 1–4.8)
LYMPHOCYTES NFR BLD: 10.9 % (ref 22–41)
MCH RBC QN AUTO: 28.5 PG (ref 27–33)
MCHC RBC AUTO-ENTMCNC: 33.3 G/DL (ref 33.7–35.3)
MCV RBC AUTO: 85.4 FL (ref 81.4–97.8)
MONOCYTES # BLD AUTO: 0.97 K/UL (ref 0–0.85)
MONOCYTES NFR BLD AUTO: 9.8 % (ref 0–13.4)
NEUTROPHILS # BLD AUTO: 7.04 K/UL (ref 1.82–7.42)
NEUTROPHILS NFR BLD: 71 % (ref 44–72)
NRBC # BLD AUTO: 0 K/UL
NRBC BLD-RTO: 0 /100 WBC
PLATELET # BLD AUTO: 395 K/UL (ref 164–446)
PMV BLD AUTO: 9.7 FL (ref 9–12.9)
POTASSIUM SERPL-SCNC: 4.2 MMOL/L (ref 3.6–5.5)
RBC # BLD AUTO: 5.48 M/UL (ref 4.7–6.1)
SODIUM SERPL-SCNC: 134 MMOL/L (ref 135–145)
WBC # BLD AUTO: 9.9 K/UL (ref 4.8–10.8)

## 2018-09-14 PROCEDURE — 36415 COLL VENOUS BLD VENIPUNCTURE: CPT

## 2018-09-14 PROCEDURE — 80048 BASIC METABOLIC PNL TOTAL CA: CPT

## 2018-09-14 PROCEDURE — 770010 HCHG ROOM/CARE - REHAB SEMI PRIVAT*

## 2018-09-14 PROCEDURE — A9270 NON-COVERED ITEM OR SERVICE: HCPCS | Performed by: HOSPITALIST

## 2018-09-14 PROCEDURE — 302131 K PAD MOTOR: Performed by: PHYSICAL MEDICINE & REHABILITATION

## 2018-09-14 PROCEDURE — G8997 SWALLOW GOAL STATUS: HCPCS | Mod: CH

## 2018-09-14 PROCEDURE — 700102 HCHG RX REV CODE 250 W/ 637 OVERRIDE(OP): Performed by: HOSPITALIST

## 2018-09-14 PROCEDURE — 94760 N-INVAS EAR/PLS OXIMETRY 1: CPT

## 2018-09-14 PROCEDURE — G8996 SWALLOW CURRENT STATUS: HCPCS | Mod: CK

## 2018-09-14 PROCEDURE — A9270 NON-COVERED ITEM OR SERVICE: HCPCS | Performed by: PHYSICAL MEDICINE & REHABILITATION

## 2018-09-14 PROCEDURE — 700102 HCHG RX REV CODE 250 W/ 637 OVERRIDE(OP)

## 2018-09-14 PROCEDURE — 99223 1ST HOSP IP/OBS HIGH 75: CPT | Performed by: PHYSICAL MEDICINE & REHABILITATION

## 2018-09-14 PROCEDURE — 700102 HCHG RX REV CODE 250 W/ 637 OVERRIDE(OP): Performed by: PHYSICAL MEDICINE & REHABILITATION

## 2018-09-14 PROCEDURE — 700102 HCHG RX REV CODE 250 W/ 637 OVERRIDE(OP): Performed by: PSYCHIATRY & NEUROLOGY

## 2018-09-14 PROCEDURE — 92523 SPEECH SOUND LANG COMPREHEN: CPT

## 2018-09-14 PROCEDURE — A9270 NON-COVERED ITEM OR SERVICE: HCPCS | Performed by: INTERNAL MEDICINE

## 2018-09-14 PROCEDURE — A9270 NON-COVERED ITEM OR SERVICE: HCPCS

## 2018-09-14 PROCEDURE — 700102 HCHG RX REV CODE 250 W/ 637 OVERRIDE(OP): Performed by: INTERNAL MEDICINE

## 2018-09-14 PROCEDURE — 99239 HOSP IP/OBS DSCHRG MGMT >30: CPT | Performed by: HOSPITALIST

## 2018-09-14 PROCEDURE — A9270 NON-COVERED ITEM OR SERVICE: HCPCS | Performed by: PSYCHIATRY & NEUROLOGY

## 2018-09-14 PROCEDURE — 85025 COMPLETE CBC W/AUTO DIFF WBC: CPT

## 2018-09-14 RX ORDER — TRAZODONE HYDROCHLORIDE 50 MG/1
50 TABLET ORAL
Status: DISCONTINUED | OUTPATIENT
Start: 2018-09-14 | End: 2018-09-16

## 2018-09-14 RX ORDER — ASPIRIN 81 MG/1
81 TABLET, CHEWABLE ORAL DAILY
Status: CANCELLED | OUTPATIENT
Start: 2018-09-15

## 2018-09-14 RX ORDER — POLYVINYL ALCOHOL 14 MG/ML
1 SOLUTION/ DROPS OPHTHALMIC PRN
Status: DISCONTINUED | OUTPATIENT
Start: 2018-09-14 | End: 2018-09-19 | Stop reason: HOSPADM

## 2018-09-14 RX ORDER — ACETAMINOPHEN 325 MG/1
650 TABLET ORAL EVERY 6 HOURS PRN
Status: CANCELLED | OUTPATIENT
Start: 2018-09-14

## 2018-09-14 RX ORDER — ECHINACEA PURPUREA EXTRACT 125 MG
2 TABLET ORAL PRN
Status: DISCONTINUED | OUTPATIENT
Start: 2018-09-14 | End: 2018-09-19 | Stop reason: HOSPADM

## 2018-09-14 RX ORDER — ALUMINA, MAGNESIA, AND SIMETHICONE 2400; 2400; 240 MG/30ML; MG/30ML; MG/30ML
20 SUSPENSION ORAL
Status: DISCONTINUED | OUTPATIENT
Start: 2018-09-14 | End: 2018-09-19 | Stop reason: HOSPADM

## 2018-09-14 RX ORDER — AMOXICILLIN 250 MG
1 CAPSULE ORAL EVERY EVENING
Status: DISCONTINUED | OUTPATIENT
Start: 2018-09-14 | End: 2018-09-19 | Stop reason: HOSPADM

## 2018-09-14 RX ORDER — ONDANSETRON 2 MG/ML
4 INJECTION INTRAMUSCULAR; INTRAVENOUS 4 TIMES DAILY PRN
Status: DISCONTINUED | OUTPATIENT
Start: 2018-09-14 | End: 2018-09-19 | Stop reason: HOSPADM

## 2018-09-14 RX ORDER — METHOCARBAMOL 500 MG/1
500 TABLET, FILM COATED ORAL 4 TIMES DAILY PRN
Status: DISCONTINUED | OUTPATIENT
Start: 2018-09-14 | End: 2018-09-19 | Stop reason: HOSPADM

## 2018-09-14 RX ORDER — LACTULOSE 20 G/30ML
30 SOLUTION ORAL
Status: DISCONTINUED | OUTPATIENT
Start: 2018-09-14 | End: 2018-09-19 | Stop reason: HOSPADM

## 2018-09-14 RX ORDER — QUETIAPINE FUMARATE 25 MG/1
25 TABLET, FILM COATED ORAL EVERY 8 HOURS PRN
Status: DISCONTINUED | OUTPATIENT
Start: 2018-09-14 | End: 2018-09-16

## 2018-09-14 RX ORDER — ACETAMINOPHEN 325 MG/1
650 TABLET ORAL EVERY 6 HOURS PRN
Status: DISCONTINUED | OUTPATIENT
Start: 2018-09-14 | End: 2018-09-15

## 2018-09-14 RX ORDER — ASPIRIN 81 MG/1
81 TABLET, CHEWABLE ORAL DAILY
Status: DISCONTINUED | OUTPATIENT
Start: 2018-09-15 | End: 2018-09-15

## 2018-09-14 RX ORDER — METHOCARBAMOL 500 MG/1
TABLET, FILM COATED ORAL
Status: COMPLETED
Start: 2018-09-14 | End: 2018-09-14

## 2018-09-14 RX ORDER — CARVEDILOL 3.12 MG/1
3.12 TABLET ORAL 2 TIMES DAILY WITH MEALS
Status: DISCONTINUED | OUTPATIENT
Start: 2018-09-14 | End: 2018-09-19 | Stop reason: HOSPADM

## 2018-09-14 RX ORDER — BISACODYL 10 MG
10 SUPPOSITORY, RECTAL RECTAL
Status: DISCONTINUED | OUTPATIENT
Start: 2018-09-14 | End: 2018-09-19 | Stop reason: HOSPADM

## 2018-09-14 RX ORDER — ANALGESIC BALM 1.74; 4.06 G/29G; G/29G
OINTMENT TOPICAL 3 TIMES DAILY PRN
Status: DISCONTINUED | OUTPATIENT
Start: 2018-09-14 | End: 2018-09-19 | Stop reason: HOSPADM

## 2018-09-14 RX ORDER — CARVEDILOL 6.25 MG/1
3.12 TABLET ORAL 2 TIMES DAILY WITH MEALS
Status: CANCELLED | OUTPATIENT
Start: 2018-09-14

## 2018-09-14 RX ORDER — ATORVASTATIN CALCIUM 40 MG/1
80 TABLET, FILM COATED ORAL EVERY EVENING
Status: DISCONTINUED | OUTPATIENT
Start: 2018-09-14 | End: 2018-09-15

## 2018-09-14 RX ORDER — ONDANSETRON 4 MG/1
4 TABLET, ORALLY DISINTEGRATING ORAL 4 TIMES DAILY PRN
Status: DISCONTINUED | OUTPATIENT
Start: 2018-09-14 | End: 2018-09-19 | Stop reason: HOSPADM

## 2018-09-14 RX ORDER — HYDRALAZINE HYDROCHLORIDE 25 MG/1
25 TABLET, FILM COATED ORAL EVERY 8 HOURS PRN
Status: DISCONTINUED | OUTPATIENT
Start: 2018-09-14 | End: 2018-09-19 | Stop reason: HOSPADM

## 2018-09-14 RX ORDER — ATORVASTATIN CALCIUM 80 MG/1
80 TABLET, FILM COATED ORAL EVERY EVENING
Status: CANCELLED | OUTPATIENT
Start: 2018-09-14

## 2018-09-14 RX ORDER — DOCUSATE SODIUM 100 MG/1
100 CAPSULE, LIQUID FILLED ORAL DAILY
Status: DISCONTINUED | OUTPATIENT
Start: 2018-09-15 | End: 2018-09-19 | Stop reason: HOSPADM

## 2018-09-14 RX ADMIN — METHOCARBAMOL 500 MG: 500 TABLET ORAL at 20:31

## 2018-09-14 RX ADMIN — ACETAMINOPHEN 650 MG: 325 TABLET, FILM COATED ORAL at 16:22

## 2018-09-14 RX ADMIN — ACETAMINOPHEN 650 MG: 325 TABLET, FILM COATED ORAL at 04:35

## 2018-09-14 RX ADMIN — SENNOSIDES AND DOCUSATE SODIUM 2 TABLET: 8.6; 5 TABLET ORAL at 04:35

## 2018-09-14 RX ADMIN — OXYCODONE HYDROCHLORIDE 10 MG: 10 TABLET ORAL at 02:58

## 2018-09-14 RX ADMIN — ATORVASTATIN CALCIUM 80 MG: 40 TABLET, FILM COATED ORAL at 20:32

## 2018-09-14 RX ADMIN — CARVEDILOL 3.12 MG: 3.12 TABLET, FILM COATED ORAL at 18:10

## 2018-09-14 RX ADMIN — QUETIAPINE 25 MG: 25 TABLET ORAL at 22:30

## 2018-09-14 RX ADMIN — TRAZODONE HYDROCHLORIDE 50 MG: 50 TABLET ORAL at 20:32

## 2018-09-14 RX ADMIN — OXYCODONE HYDROCHLORIDE 10 MG: 10 TABLET ORAL at 11:13

## 2018-09-14 RX ADMIN — DOCUSATE SODIUM AND SENNOSIDES 1 TABLET: 50; 8.6 TABLET, FILM COATED ORAL at 20:32

## 2018-09-14 RX ADMIN — ASPIRIN 81 MG: 81 TABLET, CHEWABLE ORAL at 04:35

## 2018-09-14 RX ADMIN — CARVEDILOL 3.12 MG: 6.25 TABLET, FILM COATED ORAL at 08:05

## 2018-09-14 ASSESSMENT — PAIN SCALES - GENERAL
PAINLEVEL_OUTOF10: 0
PAINLEVEL_OUTOF10: 7
PAINLEVEL_OUTOF10: 10
PAINLEVEL_OUTOF10: 10
PAINLEVEL_OUTOF10: 3

## 2018-09-14 ASSESSMENT — LIFESTYLE VARIABLES
ALCOHOL_USE: YES
TOTAL SCORE: 0
TOTAL SCORE: 0
EVER FELT BAD OR GUILTY ABOUT YOUR DRINKING: NO
HAVE PEOPLE ANNOYED YOU BY CRITICIZING YOUR DRINKING: NO
CONSUMPTION TOTAL: NEGATIVE
TOTAL SCORE: 0
HAVE YOU EVER FELT YOU SHOULD CUT DOWN ON YOUR DRINKING: NO
ON A TYPICAL DAY WHEN YOU DRINK ALCOHOL HOW MANY DRINKS DO YOU HAVE: 1
EVER_SMOKED: YES
AVERAGE NUMBER OF DAYS PER WEEK YOU HAVE A DRINK CONTAINING ALCOHOL: 2
EVER HAD A DRINK FIRST THING IN THE MORNING TO STEADY YOUR NERVES TO GET RID OF A HANGOVER: NO
HOW MANY TIMES IN THE PAST YEAR HAVE YOU HAD 5 OR MORE DRINKS IN A DAY: 0

## 2018-09-14 ASSESSMENT — PATIENT HEALTH QUESTIONNAIRE - PHQ9
2. FEELING DOWN, DEPRESSED, IRRITABLE, OR HOPELESS: NOT AT ALL
1. LITTLE INTEREST OR PLEASURE IN DOING THINGS: NOT AT ALL
SUM OF ALL RESPONSES TO PHQ9 QUESTIONS 1 AND 2: 0

## 2018-09-14 NOTE — CARE PLAN
Problem: Safety  Goal: Will remain free from injury  Outcome: PROGRESSING AS EXPECTED  Bed in lowest position, treaded socks, bed alarm on    Problem: Fluid Volume:  Goal: Will maintain balanced intake and output  Outcome: PROGRESSING AS EXPECTED  Pt is PO intake and out put is good

## 2018-09-14 NOTE — DISCHARGE PLANNING
Dr. Taylor has accepted Fil[facility].  Transport has been arranged for 1330.  Vishal, [address], is aware.

## 2018-09-14 NOTE — PROGRESS NOTES
Patient picked up for transfer to [facility], family at bedside and left with patient and all belongings

## 2018-09-14 NOTE — DISCHARGE PLANNING
Anticipated Discharge Disposition: [place]    Action: LSW provided patient's son with current patient admission letter.  Patient to d/c to [facility] this afternoon.    Barriers to Discharge: None    Plan: LSW to continue to assist with d/c as needed.
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
## 2018-09-14 NOTE — DISCHARGE INSTRUCTIONS
"Discharge Instructions    Discharged to other by medical transportation with relative. Discharged via ambulance, hospital escort: Yes.  Special equipment needed: Walker    Be sure to schedule a follow-up appointment with your primary care doctor or any specialists as instructed.     Discharge Plan:   Smoking Cessation Offered: Patient Counseled  Influenza Vaccine Indication: Patient Refuses    I understand that a diet low in cholesterol, fat, and sodium is recommended for good health. Unless I have been given specific instructions below for another diet, I accept this instruction as my diet prescription.   Other diet: Dysphagia 1 with nectar thickened liquids    Special Instructions:     Stroke/CVA/TIA/Hemorrhagic Ischemia Discharge Instructions  You have had a stroke. Your risk factors have been identified as follows:  Gender - Men are at a higher risk than women  High blood pressure  Previous TIAs or \"mini strokes\"  Smoking  Overweight  It is important that you reduce your risk factors to avoid another stroke in the future. Here are some general guidelines to follow:  · Eat healthy - avoid food high in fat.  · Get regular exercise.  · Maintain a healthy weight.  · Avoid smoking.  · Avoid alcohol and illegal drug use.  · Take your medications as directed.  For more information regarding risk factors, refer to pages 17-19 in your Stroke Patient Education Guide. Stroke Education Guide was given to patient.    Warning signs of a stroke include (which can also be found on page 3 of your Stroke Patient Education Guide):  · Sudden numbness of weakness of the face, arm or leg (especially on one side of the body).  · Sudden confusion, trouble speaking or understanding.  · Sudden trouble seeing in one or both eyes.  · Sudden trouble walking, dizziness, loss of balance or coordination.  · Sudden severe headache with no known cause.  It is very important to get treatment quickly when a stroke occurs. If you experience any of " the above warning signs, call [phone] immediately.     Some patients who have had a stroke will be going home on a blood thinner medication called Warfarin (Coumadin).  This medication requires very close monitoring and follow up.  This follow up can be provided by either your Primary Care Physician or by [facility] Outpatient Anticoagulation Service.  The Outpatient Anticoagulation Service is located at the [facility] at [facility] ([facility]).  If you do not know when your follow up appointment is scheduled, call [phone] to verify your appointment time.      · Is patient discharged on Warfarin / Coumadin?   No     Depression / Suicide Risk    As you are discharged from this [facility], it is important to learn how to keep safe from harming yourself.    Recognize the warning signs:  · Abrupt changes in personality, positive or negative- including increase in energy   · Giving away possessions  · Change in eating patterns- significant weight changes-  positive or negative  · Change in sleeping patterns- unable to sleep or sleeping all the time   · Unwillingness or inability to communicate  · Depression  · Unusual sadness, discouragement and loneliness  · Talk of wanting to die  · Neglect of personal appearance   · Rebelliousness- reckless behavior  · Withdrawal from people/activities they love  · Confusion- inability to concentrate     If you or a loved one observes any of these behaviors or has concerns about self-harm, here's what you can do:  · Talk about it- your feelings and reasons for harming yourself  · Remove any means that you might use to hurt yourself (examples: pills, rope, extension cords, firearm)  · Get professional help from the community (Mental Health, Substance Abuse, psychological counseling)  · Do not be alone:Call your Safe Contact- someone whom you trust who will be there for you.  · Call your local CRISIS HOTLINE 758-9970 or  497-404-3833  · Call your local Children's Mobile Crisis Response Team [place] (528) 703-9571 or www.[employer].io  · Call the toll free National Suicide Prevention Hotlines   · National Suicide Prevention Lifeline 034-535-HDZR (7176)  · National [employer] Line Network 800-SUICIDE (668-1295)

## 2018-09-14 NOTE — PROGRESS NOTES
Pt is A&Ox3. VSS. Medication given for pain as charted, pt verbalizes relief. No change in neuro status from previous baseline. Tolerating dysphagia diet. Voiding. Ambulates with walker and 1 assist. Refuses SCDs. Family at bedside. Report called to Iesha at receiving facility, all questions answered. Pt is stable and resting in room awaiting transportation to rehab.
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
## 2018-09-14 NOTE — PROGRESS NOTES
Assumed care of patient at 1900. Patient alert & oriented x3, with intermittent forgetfulness. Patient does not recall events leading up to hospitalization. Patient complains generalized pain; given pain meds and heat packs. Patient refusing staff turns, turns self. Refusing condom cath replacement this morning, states he will use urinal. VSS. No other needs.

## 2018-09-14 NOTE — FLOWSHEET NOTE
09/14/18 1359   Events/Summary/Plan   Events/Summary/Plan Initial RCP assessment   Respiratory WDL   Respiratory (WDL) X   Chest Exam   Work Of Breathing / Effort Mild   Respiration 18   Pulse 66   Breath Sounds   RUL Breath Sounds Diminished   RML Breath Sounds Diminished   RLL Breath Sounds Diminished   THAIS Breath Sounds Diminished   LLL Breath Sounds Crackles   Oximetry   #Pulse Oximetry (Single Determination) Yes   Oxygen   Home O2 Use Prior To Admission? No   Pulse Oximetry 94 %   O2 Daily Delivery Respiratory  Room Air with O2 Available

## 2018-09-14 NOTE — CARE PLAN
Problem: Safety  Goal: Will remain free from injury  Call light use was instructed. However, patient demonstrates poor safety awareness. Pt. will be moved to a room close to the nurse station in the next few minutes. Bed and chair alarm in place. Family at the bedside at this moment.     Problem: Skin Integrity  Goal: Risk for impaired skin integrity will decrease  Pt. able  to turn by himself. Pt. was educated about the importance of reliving pressure from buttocks to maintain skin integrity.

## 2018-09-15 PROBLEM — R79.89 LOW VITAMIN D LEVEL: Status: ACTIVE | Noted: 2018-09-15

## 2018-09-15 PROBLEM — R41.89 COGNITIVE DEFICITS: Status: ACTIVE | Noted: 2018-09-15

## 2018-09-15 LAB
25(OH)D3 SERPL-MCNC: 18 NG/ML (ref 30–100)
ALBUMIN SERPL BCP-MCNC: 4 G/DL (ref 3.2–4.9)
ALBUMIN/GLOB SERPL: 1.3 G/DL
ALP SERPL-CCNC: 65 U/L (ref 30–99)
ALT SERPL-CCNC: 25 U/L (ref 2–50)
ANION GAP SERPL CALC-SCNC: 8 MMOL/L (ref 0–11.9)
AST SERPL-CCNC: 14 U/L (ref 12–45)
BASOPHILS # BLD AUTO: 1.1 % (ref 0–1.8)
BASOPHILS # BLD: 0.1 K/UL (ref 0–0.12)
BILIRUB SERPL-MCNC: 0.7 MG/DL (ref 0.1–1.5)
BNP SERPL-MCNC: 16 PG/ML (ref 0–100)
BUN SERPL-MCNC: 18 MG/DL (ref 8–22)
CALCIUM SERPL-MCNC: 9.6 MG/DL (ref 8.5–10.5)
CHLORIDE SERPL-SCNC: 105 MMOL/L (ref 96–112)
CO2 SERPL-SCNC: 26 MMOL/L (ref 20–33)
CREAT SERPL-MCNC: 0.8 MG/DL (ref 0.5–1.4)
EOSINOPHIL # BLD AUTO: 0.41 K/UL (ref 0–0.51)
EOSINOPHIL NFR BLD: 4.6 % (ref 0–6.9)
ERYTHROCYTE [DISTWIDTH] IN BLOOD BY AUTOMATED COUNT: 42.4 FL (ref 35.9–50)
EST. AVERAGE GLUCOSE BLD GHB EST-MCNC: 114 MG/DL
GLOBULIN SER CALC-MCNC: 3.1 G/DL (ref 1.9–3.5)
GLUCOSE SERPL-MCNC: 84 MG/DL (ref 65–99)
HBA1C MFR BLD: 5.6 % (ref 0–5.6)
HCT VFR BLD AUTO: 46.7 % (ref 42–52)
HGB BLD-MCNC: 15.6 G/DL (ref 14–18)
IMM GRANULOCYTES # BLD AUTO: 0.13 K/UL (ref 0–0.11)
IMM GRANULOCYTES NFR BLD AUTO: 1.5 % (ref 0–0.9)
LYMPHOCYTES # BLD AUTO: 1.02 K/UL (ref 1–4.8)
LYMPHOCYTES NFR BLD: 11.5 % (ref 22–41)
MAGNESIUM SERPL-MCNC: 2.1 MG/DL (ref 1.5–2.5)
MCH RBC QN AUTO: 29.2 PG (ref 27–33)
MCHC RBC AUTO-ENTMCNC: 33.4 G/DL (ref 33.7–35.3)
MCV RBC AUTO: 87.3 FL (ref 81.4–97.8)
MONOCYTES # BLD AUTO: 0.97 K/UL (ref 0–0.85)
MONOCYTES NFR BLD AUTO: 10.9 % (ref 0–13.4)
NEUTROPHILS # BLD AUTO: 6.26 K/UL (ref 1.82–7.42)
NEUTROPHILS NFR BLD: 70.4 % (ref 44–72)
NRBC # BLD AUTO: 0 K/UL
NRBC BLD-RTO: 0 /100 WBC
PLATELET # BLD AUTO: 360 K/UL (ref 164–446)
PMV BLD AUTO: 10 FL (ref 9–12.9)
POTASSIUM SERPL-SCNC: 4 MMOL/L (ref 3.6–5.5)
PROT SERPL-MCNC: 7.1 G/DL (ref 6–8.2)
RBC # BLD AUTO: 5.35 M/UL (ref 4.7–6.1)
SODIUM SERPL-SCNC: 139 MMOL/L (ref 135–145)
WBC # BLD AUTO: 8.9 K/UL (ref 4.8–10.8)

## 2018-09-15 PROCEDURE — A9270 NON-COVERED ITEM OR SERVICE: HCPCS | Performed by: PHYSICAL MEDICINE & REHABILITATION

## 2018-09-15 PROCEDURE — 97161 PT EVAL LOW COMPLEX 20 MIN: CPT

## 2018-09-15 PROCEDURE — 99233 SBSQ HOSP IP/OBS HIGH 50: CPT | Performed by: PHYSICAL MEDICINE & REHABILITATION

## 2018-09-15 PROCEDURE — 97535 SELF CARE MNGMENT TRAINING: CPT

## 2018-09-15 PROCEDURE — 97166 OT EVAL MOD COMPLEX 45 MIN: CPT

## 2018-09-15 PROCEDURE — 700112 HCHG RX REV CODE 229: Performed by: PHYSICAL MEDICINE & REHABILITATION

## 2018-09-15 PROCEDURE — 97116 GAIT TRAINING THERAPY: CPT

## 2018-09-15 PROCEDURE — 83036 HEMOGLOBIN GLYCOSYLATED A1C: CPT

## 2018-09-15 PROCEDURE — 80053 COMPREHEN METABOLIC PANEL: CPT

## 2018-09-15 PROCEDURE — 770010 HCHG ROOM/CARE - REHAB SEMI PRIVAT*

## 2018-09-15 PROCEDURE — 97162 PT EVAL MOD COMPLEX 30 MIN: CPT

## 2018-09-15 PROCEDURE — 82306 VITAMIN D 25 HYDROXY: CPT

## 2018-09-15 PROCEDURE — 83735 ASSAY OF MAGNESIUM: CPT

## 2018-09-15 PROCEDURE — 700102 HCHG RX REV CODE 250 W/ 637 OVERRIDE(OP): Performed by: PHYSICAL MEDICINE & REHABILITATION

## 2018-09-15 PROCEDURE — 36415 COLL VENOUS BLD VENIPUNCTURE: CPT

## 2018-09-15 PROCEDURE — 92610 EVALUATE SWALLOWING FUNCTION: CPT

## 2018-09-15 PROCEDURE — 83880 ASSAY OF NATRIURETIC PEPTIDE: CPT

## 2018-09-15 PROCEDURE — 92523 SPEECH SOUND LANG COMPREHEN: CPT

## 2018-09-15 PROCEDURE — 700111 HCHG RX REV CODE 636 W/ 250 OVERRIDE (IP): Performed by: PHYSICAL MEDICINE & REHABILITATION

## 2018-09-15 PROCEDURE — 85025 COMPLETE CBC W/AUTO DIFF WBC: CPT

## 2018-09-15 RX ORDER — ASPIRIN 81 MG/1
81 TABLET, CHEWABLE ORAL DAILY
Status: DISCONTINUED | OUTPATIENT
Start: 2018-09-16 | End: 2018-09-19 | Stop reason: HOSPADM

## 2018-09-15 RX ORDER — ATORVASTATIN CALCIUM 40 MG/1
80 TABLET, FILM COATED ORAL EVERY EVENING
Status: DISCONTINUED | OUTPATIENT
Start: 2018-09-15 | End: 2018-09-19 | Stop reason: HOSPADM

## 2018-09-15 RX ORDER — ACETAMINOPHEN 325 MG/1
650 TABLET ORAL EVERY 4 HOURS PRN
Status: DISCONTINUED | OUTPATIENT
Start: 2018-09-15 | End: 2018-09-19 | Stop reason: HOSPADM

## 2018-09-15 RX ORDER — ACETAMINOPHEN 325 MG/1
TABLET ORAL
Status: ACTIVE
Start: 2018-09-15 | End: 2018-09-15

## 2018-09-15 RX ADMIN — CARVEDILOL 3.12 MG: 3.12 TABLET, FILM COATED ORAL at 18:29

## 2018-09-15 RX ADMIN — ACETAMINOPHEN 650 MG: 325 TABLET, FILM COATED ORAL at 05:58

## 2018-09-15 RX ADMIN — DOCUSATE SODIUM AND SENNOSIDES 1 TABLET: 50; 8.6 TABLET, FILM COATED ORAL at 19:47

## 2018-09-15 RX ADMIN — QUETIAPINE 25 MG: 25 TABLET ORAL at 11:22

## 2018-09-15 RX ADMIN — METHOCARBAMOL 500 MG: 500 TABLET ORAL at 09:29

## 2018-09-15 RX ADMIN — TRAZODONE HYDROCHLORIDE 50 MG: 50 TABLET ORAL at 19:48

## 2018-09-15 RX ADMIN — ALUMINUM HYDROXIDE, MAGNESIUM HYDROXIDE, AND DIMETHICONE 20 ML: 400; 400; 40 SUSPENSION ORAL at 20:48

## 2018-09-15 RX ADMIN — VITAMIN D, TAB 1000IU (100/BT) 1000 UNITS: 25 TAB at 11:22

## 2018-09-15 RX ADMIN — ALUMINUM HYDROXIDE, MAGNESIUM HYDROXIDE, AND DIMETHICONE 20 ML: 400; 400; 40 SUSPENSION ORAL at 18:33

## 2018-09-15 RX ADMIN — ATORVASTATIN CALCIUM 80 MG: 40 TABLET, FILM COATED ORAL at 19:47

## 2018-09-15 RX ADMIN — QUETIAPINE 25 MG: 25 TABLET ORAL at 20:48

## 2018-09-15 RX ADMIN — CARVEDILOL 3.12 MG: 3.12 TABLET, FILM COATED ORAL at 09:12

## 2018-09-15 RX ADMIN — ACETAMINOPHEN 650 MG: 325 TABLET, FILM COATED ORAL at 11:20

## 2018-09-15 RX ADMIN — ASPIRIN 81 MG 81 MG: 81 TABLET ORAL at 09:11

## 2018-09-15 RX ADMIN — DOCUSATE SODIUM 100 MG: 100 CAPSULE, LIQUID FILLED ORAL at 09:12

## 2018-09-15 RX ADMIN — ACETAMINOPHEN 650 MG: 325 TABLET, FILM COATED ORAL at 18:31

## 2018-09-15 RX ADMIN — ONDANSETRON 4 MG: 4 TABLET, ORALLY DISINTEGRATING ORAL at 15:04

## 2018-09-15 ASSESSMENT — GAIT ASSESSMENTS
DISTANCE (FEET): 25
DISTANCE (FEET): 25
GAIT LEVEL OF ASSIST: MODERATE ASSIST
ASSISTIVE DEVICE: FRONT WHEEL WALKER
DEVIATION: DECREASED BASE OF SUPPORT;DECREASED HEEL STRIKE;DECREASED TOE OFF;ATAXIC
ASSISTIVE DEVICE: FRONT WHEEL WALKER
GAIT LEVEL OF ASSIST: MODERATE ASSIST

## 2018-09-15 ASSESSMENT — BRIEF INTERVIEW FOR MENTAL STATUS (BIMS): GENERAL MEMORY AND RECALL ABILITY: STAFF NAMES AND FACES;RECOGNIZES APPROPRIATE HEALTHCARE SETTING

## 2018-09-15 ASSESSMENT — ACTIVITIES OF DAILY LIVING (ADL): TOILETING: INDEPENDENT

## 2018-09-15 ASSESSMENT — PAIN SCALES - GENERAL
PAINLEVEL_OUTOF10: 10
PAINLEVEL_OUTOF10: 7

## 2018-09-15 NOTE — CARE PLAN
"Problem: Communication  Goal: The ability to communicate needs accurately and effectively will improve    Intervention: Educate patient and significant other/support system about the plan of care, procedures, treatments, medications and allow for questions  Son and brother were here this am. Patient continues to be inappropriate with female nurses stating \"will you join me in the bed\". He also told the ST that his testicles were \"wet\" and needed to be dried. Needs firm boundaries set.      Problem: Safety  Goal: Will remain free from injury    Intervention: Provide assistance with mobility  Very impulsive and unsteady on his feet. Alarms on the bed and w/c. Needs max cues to to call for assist with any care.        "

## 2018-09-15 NOTE — PROGRESS NOTES
"Rehab Progress Note     Date of Service: 9/15/2018  Chief Complaint: follow up stroke    Interval Events (Subjective)    Patient seen and examined in his room today. He is very impulsive, says inappropriate things to staff, is going to be moved to the TBI section with a wander guard in place. His memory is so poor he cannot remember that he ate his meals.    Objective:  VITAL SIGNS: /54   Pulse 91   Temp 36.4 °C (97.6 °F)   Resp 17   Ht 1.803 m (5' 11\")   Wt 112 kg (246 lb 14.6 oz)   SpO2 95%   BMI 34.44 kg/m²   Gen: alert, no apparent distress  CV: regular rate and rhythm, no murmurs, no peripheral edema  Resp: clear to ascultation bilaterally, normal respiratory effort  GI: soft, non-tender abdomen, bowel sounds present  Neuro: notable for mild left UE weakness, impulsive, poor memory      Recent Results (from the past 72 hour(s))   ACCU-CHEK GLUCOSE    Collection Time: 09/13/18 12:11 AM   Result Value Ref Range    Glucose - Accu-Ck 99 65 - 99 mg/dL   Basic Metabolic Panel (BMP)    Collection Time: 09/13/18  4:51 AM   Result Value Ref Range    Sodium 137 135 - 145 mmol/L    Potassium 3.9 3.6 - 5.5 mmol/L    Chloride 105 96 - 112 mmol/L    Co2 23 20 - 33 mmol/L    Glucose 105 (H) 65 - 99 mg/dL    Bun 22 8 - 22 mg/dL    Creatinine 0.88 0.50 - 1.40 mg/dL    Calcium 8.8 8.5 - 10.5 mg/dL    Anion Gap 9.0 0.0 - 11.9   CBC with Differential    Collection Time: 09/13/18  4:51 AM   Result Value Ref Range    WBC 9.2 4.8 - 10.8 K/uL    RBC 5.26 4.70 - 6.10 M/uL    Hemoglobin 15.0 14.0 - 18.0 g/dL    Hematocrit 45.5 42.0 - 52.0 %    MCV 86.5 81.4 - 97.8 fL    MCH 28.5 27.0 - 33.0 pg    MCHC 33.0 (L) 33.7 - 35.3 g/dL    RDW 43.3 35.9 - 50.0 fL    Platelet Count 312 164 - 446 K/uL    MPV 10.0 9.0 - 12.9 fL    Neutrophils-Polys 70.20 44.00 - 72.00 %    Lymphocytes 11.40 (L) 22.00 - 41.00 %    Monocytes 9.80 0.00 - 13.40 %    Eosinophils 6.00 0.00 - 6.90 %    Basophils 1.50 0.00 - 1.80 %    Immature Granulocytes 1.10 " (H) 0.00 - 0.90 %    Nucleated RBC 0.00 /100 WBC    Neutrophils (Absolute) 6.45 1.82 - 7.42 K/uL    Lymphs (Absolute) 1.05 1.00 - 4.80 K/uL    Monos (Absolute) 0.90 (H) 0.00 - 0.85 K/uL    Eos (Absolute) 0.55 (H) 0.00 - 0.51 K/uL    Baso (Absolute) 0.14 (H) 0.00 - 0.12 K/uL    Immature Granulocytes (abs) 0.10 0.00 - 0.11 K/uL    NRBC (Absolute) 0.00 K/uL   ESTIMATED GFR    Collection Time: 09/13/18  4:51 AM   Result Value Ref Range    GFR If African American >60 >60 mL/min/1.73 m 2    GFR If Non African American >60 >60 mL/min/1.73 m 2   ACCU-CHEK GLUCOSE    Collection Time: 09/13/18  6:37 AM   Result Value Ref Range    Glucose - Accu-Ck 121 (H) 65 - 99 mg/dL   Basic Metabolic Panel (BMP)    Collection Time: 09/14/18  4:45 AM   Result Value Ref Range    Sodium 134 (L) 135 - 145 mmol/L    Potassium 4.2 3.6 - 5.5 mmol/L    Chloride 104 96 - 112 mmol/L    Co2 21 20 - 33 mmol/L    Glucose 103 (H) 65 - 99 mg/dL    Bun 20 8 - 22 mg/dL    Creatinine 0.73 0.50 - 1.40 mg/dL    Calcium 9.2 8.5 - 10.5 mg/dL    Anion Gap 9.0 0.0 - 11.9   ESTIMATED GFR    Collection Time: 09/14/18  4:45 AM   Result Value Ref Range    GFR If African American >60 >60 mL/min/1.73 m 2    GFR If Non African American >60 >60 mL/min/1.73 m 2   CBC with Differential    Collection Time: 09/14/18  4:46 AM   Result Value Ref Range    WBC 9.9 4.8 - 10.8 K/uL    RBC 5.48 4.70 - 6.10 M/uL    Hemoglobin 15.6 14.0 - 18.0 g/dL    Hematocrit 46.8 42.0 - 52.0 %    MCV 85.4 81.4 - 97.8 fL    MCH 28.5 27.0 - 33.0 pg    MCHC 33.3 (L) 33.7 - 35.3 g/dL    RDW 41.3 35.9 - 50.0 fL    Platelet Count 395 164 - 446 K/uL    MPV 9.7 9.0 - 12.9 fL    Neutrophils-Polys 71.00 44.00 - 72.00 %    Lymphocytes 10.90 (L) 22.00 - 41.00 %    Monocytes 9.80 0.00 - 13.40 %    Eosinophils 5.70 0.00 - 6.90 %    Basophils 1.50 0.00 - 1.80 %    Immature Granulocytes 1.10 (H) 0.00 - 0.90 %    Nucleated RBC 0.00 /100 WBC    Neutrophils (Absolute) 7.04 1.82 - 7.42 K/uL    Lymphs (Absolute) 1.08  1.00 - 4.80 K/uL    Monos (Absolute) 0.97 (H) 0.00 - 0.85 K/uL    Eos (Absolute) 0.56 (H) 0.00 - 0.51 K/uL    Baso (Absolute) 0.15 (H) 0.00 - 0.12 K/uL    Immature Granulocytes (abs) 0.11 0.00 - 0.11 K/uL    NRBC (Absolute) 0.00 K/uL   BType Natriuretic Peptide (BNP)    Collection Time: 09/15/18  5:11 AM   Result Value Ref Range    B Natriuretic Peptide 16 0 - 100 pg/mL   CBC with Differential    Collection Time: 09/15/18  5:11 AM   Result Value Ref Range    WBC 8.9 4.8 - 10.8 K/uL    RBC 5.35 4.70 - 6.10 M/uL    Hemoglobin 15.6 14.0 - 18.0 g/dL    Hematocrit 46.7 42.0 - 52.0 %    MCV 87.3 81.4 - 97.8 fL    MCH 29.2 27.0 - 33.0 pg    MCHC 33.4 (L) 33.7 - 35.3 g/dL    RDW 42.4 35.9 - 50.0 fL    Platelet Count 360 164 - 446 K/uL    MPV 10.0 9.0 - 12.9 fL    Neutrophils-Polys 70.40 44.00 - 72.00 %    Lymphocytes 11.50 (L) 22.00 - 41.00 %    Monocytes 10.90 0.00 - 13.40 %    Eosinophils 4.60 0.00 - 6.90 %    Basophils 1.10 0.00 - 1.80 %    Immature Granulocytes 1.50 (H) 0.00 - 0.90 %    Nucleated RBC 0.00 /100 WBC    Neutrophils (Absolute) 6.26 1.82 - 7.42 K/uL    Lymphs (Absolute) 1.02 1.00 - 4.80 K/uL    Monos (Absolute) 0.97 (H) 0.00 - 0.85 K/uL    Eos (Absolute) 0.41 0.00 - 0.51 K/uL    Baso (Absolute) 0.10 0.00 - 0.12 K/uL    Immature Granulocytes (abs) 0.13 (H) 0.00 - 0.11 K/uL    NRBC (Absolute) 0.00 K/uL   Comp Metabolic Panel (CMP)    Collection Time: 09/15/18  5:11 AM   Result Value Ref Range    Sodium 139 135 - 145 mmol/L    Potassium 4.0 3.6 - 5.5 mmol/L    Chloride 105 96 - 112 mmol/L    Co2 26 20 - 33 mmol/L    Anion Gap 8.0 0.0 - 11.9    Glucose 84 65 - 99 mg/dL    Bun 18 8 - 22 mg/dL    Creatinine 0.80 0.50 - 1.40 mg/dL    Calcium 9.6 8.5 - 10.5 mg/dL    AST(SGOT) 14 12 - 45 U/L    ALT(SGPT) 25 2 - 50 U/L    Alkaline Phosphatase 65 30 - 99 U/L    Total Bilirubin 0.7 0.1 - 1.5 mg/dL    Albumin 4.0 3.2 - 4.9 g/dL    Total Protein 7.1 6.0 - 8.2 g/dL    Globulin 3.1 1.9 - 3.5 g/dL    A-G Ratio 1.3 g/dL    Magnesium    Collection Time: 09/15/18  5:11 AM   Result Value Ref Range    Magnesium 2.1 1.5 - 2.5 mg/dL   Vitamin D, 25-hydroxy (blood)    Collection Time: 09/15/18  5:11 AM   Result Value Ref Range    25-Hydroxy   Vitamin D 25 18 (L) 30 - 100 ng/mL   ESTIMATED GFR    Collection Time: 09/15/18  5:11 AM   Result Value Ref Range    GFR If African American >60 >60 mL/min/1.73 m 2    GFR If Non African American >60 >60 mL/min/1.73 m 2       Current Facility-Administered Medications   Medication Frequency   • vitamin D (cholecalciferol) tablet 1,000 Units DAILY   • [START ON 9/16/2018] aspirin (ASA) chewable tab 81 mg DAILY   • atorvastatin (LIPITOR) tablet 80 mg Q EVENING   • acetaminophen (TYLENOL) tablet 650 mg Q4HRS PRN   • ACETAMINOPHEN 325 MG PO TABS    • Respiratory Care per Protocol Continuous RT   • Pharmacy Consult Request ...Pain Management Review 1 Each PRN   • docusate sodium (COLACE) capsule 100 mg DAILY    And   • senna-docusate (PERICOLACE or SENOKOT S) 8.6-50 MG per tablet 1 Tab Q EVENING    And   • lactulose 20 GM/30ML solution 30 mL Q24HRS PRN    And   • bisacodyl (DULCOLAX) suppository 10 mg QDAY PRN   • artificial tears 1.4 % ophthalmic solution 1 Drop PRN   • hydrALAZINE (APRESOLINE) tablet 25 mg Q8HRS PRN   • mag hydrox-al hydrox-simeth (MAALOX PLUS ES or MYLANTA DS) suspension 20 mL Q2HRS PRN   • ondansetron (ZOFRAN ODT) dispertab 4 mg 4X/DAY PRN    Or   • ondansetron (ZOFRAN) syringe/vial injection 4 mg 4X/DAY PRN   • traZODone (DESYREL) tablet 50 mg QHS PRN   • sodium chloride (OCEAN) 0.65 % nasal spray 2 Spray PRN   • carvedilol (COREG) tablet 3.125 mg BID WITH MEALS   • QUEtiapine (SEROQUEL) tablet 25 mg Q8HRS PRN   • menthol-methyl salicylate (BENGAY) ointment TID PRN   • methocarbamol (ROBAXIN) tablet 500 mg 4X/DAY PRN       Orders Placed This Encounter   Procedures   • Diet Order Regular     Standing Status:   Standing     Number of Occurrences:   1     Order Specific Question:   Diet:      Answer:   Regular [1]     Order Specific Question:   Texture/Fiber modifications:     Answer:   Dysphagia 1(Pureed)specify fluid consistency(question 6) [1]     Order Specific Question:   Consistency/Fluid modifications:     Answer:   Nectar Thick [2]     Order Specific Question:   Miscellaneous modifications:     Answer:   SLP - 1:1 Supervision by Nursing [21]     Order Specific Question:   Miscellaneous modifications:     Answer:   SLP - Deliver to Nursing Station [22]       Assessment:  Active Hospital Problems    Diagnosis   • Stroke (HCC)   • Cerebral edema (HCC)   • Cognitive deficits   • Low vitamin D level   • Dysphagia as late effect of cerebrovascular accident (CVA)   • Cardiomyopathy (HCC)     This patient is a 53 y.o. male admitted for acute inpatient rehabilitation with moderate right MCA stroke with hemorrhagic conversion.    THIS PATIENT IS ON THE STROKE PATHWAY    Medical Decision Making and Plan:    Right MCA stroke with hemorrhagic conversion and edema  Continue full rehab program  PT/OT/SLP, 1 hr each discipline, 5 days per week  Continue baby aspirin and statin for secondary stroke prophylaxis    Impulsive  Move closer to nursing station  PRN seroquel     Systolic CHF  EF 45%  Consult hospitalist     PFO  Outpatient follow up with cardiology  Dopplers negative for DVT, so not cause of stroke etiology     Hypertension  Continue Coreg  Consult hospitalist     Hypertriglyceridemia   Continue statin     Back pain  Acute onset in last week  Exam consistent with paraspinal muscle spasm  Therapy, bengay, hot pack  PRN robaxin  Avoid narcotics    Low Vit D, 18  Start supplementation    DVT prophylaxis:  Contraindicated given hemorrhagic conversion. Increase mobility. Monitor for edema.    Total time:  35 minutes.  I spent greater than 50% of the time for patient care, counseling, and coordination on this date, including patient face-to face time, unit/floor time with review of records/pertinent lab  data and studies, as well as discussing diagnostic evaluation/work up, planned therapeutic interventions, and future disposition of care, as per the interval events/subjective and the assessment and plan as noted above.      Naomi Taylor M.D.

## 2018-09-15 NOTE — THERAPY
Occupational Therapy Initial Plan of Care Note    1) Assessment:  Patient is 53 y.o. male with a diagnosis of initially hospitalized on 9/6 after L sided weakness during a fishing trip in [place], dx with R CVA.  Additional factors influencing patient status / progress (ie: cognitive factors, co-morbidities, social support, etc): Pt lives in [facility] and per report with indep with all ADL, IADL, driving, and employed fulltime. Requires heavy assistance for ADL, potentially more due to lack of motivation and poor safety awareness. Significant medical hx of low back pain, HTN, and CHF. .  Long term and short term goals have been discussed with patient and they are in agreement.  2) Plan:  Recommend Occupational Therapy  minutes per day 5-7 days per week for 2 weeks for the following treatments:  OT E Stim Attended, OT Group Therapy, OT Self Care/ADL, OT Cognitive Skill Dev, OT Community Reintegration, OT Manual Ther Technique, OT Neuro Re-Ed/Balance, OT Sensory Int Techniques, OT Therapeutic Activity, OT Aquatic Therapy, OT Evaluation and OT Therapeutic Exercise.  3) Goals:  Please refer to care plan for goals.
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
## 2018-09-15 NOTE — THERAPY
Physical Therapy Initial Plan of Care Note    1) Assessment: Patient is 53 y.o. male with a diagnosis of R MCA infarct with hemorrhagic conversion.  Additional factors influencing patient status / progress : include impulsivity/ confusion/ mild agitation.  Long term and short term goals have been discussed with patient and they are in agreement.  2) Plan: Recommend Physical Therapy 30-60 minutes per day 5-6 days per week for 2-3 weeks for the following treatments:  PT Group Therapy, PT Gait Training, PT Self Care/Home Eval, PT Therapeutic Exercises, PT Neuro Re-Ed/Balance, PT Aquatic Therapy, PT Therapeutic Activity, PT Manual Therapy and PT Evaluation.  3) Goals:  Please refer to care plan for goals.

## 2018-09-16 PROCEDURE — G0515 COGNITIVE SKILLS DEVELOPMENT: HCPCS

## 2018-09-16 PROCEDURE — 92507 TX SP LANG VOICE COMM INDIV: CPT

## 2018-09-16 PROCEDURE — 700102 HCHG RX REV CODE 250 W/ 637 OVERRIDE(OP): Performed by: PHYSICAL MEDICINE & REHABILITATION

## 2018-09-16 PROCEDURE — 700112 HCHG RX REV CODE 229: Performed by: PHYSICAL MEDICINE & REHABILITATION

## 2018-09-16 PROCEDURE — 92526 ORAL FUNCTION THERAPY: CPT

## 2018-09-16 PROCEDURE — A9270 NON-COVERED ITEM OR SERVICE: HCPCS | Performed by: PHYSICAL MEDICINE & REHABILITATION

## 2018-09-16 PROCEDURE — 770010 HCHG ROOM/CARE - REHAB SEMI PRIVAT*

## 2018-09-16 PROCEDURE — 99233 SBSQ HOSP IP/OBS HIGH 50: CPT | Performed by: PHYSICAL MEDICINE & REHABILITATION

## 2018-09-16 RX ORDER — LANOLIN ALCOHOL/MO/W.PET/CERES
3 CREAM (GRAM) TOPICAL
Status: DISCONTINUED | OUTPATIENT
Start: 2018-09-16 | End: 2018-09-19 | Stop reason: HOSPADM

## 2018-09-16 RX ORDER — QUETIAPINE FUMARATE 25 MG/1
25 TABLET, FILM COATED ORAL 3 TIMES DAILY
Status: DISCONTINUED | OUTPATIENT
Start: 2018-09-16 | End: 2018-09-18

## 2018-09-16 RX ORDER — TRAZODONE HYDROCHLORIDE 50 MG/1
50 TABLET ORAL
Status: DISCONTINUED | OUTPATIENT
Start: 2018-09-16 | End: 2018-09-18

## 2018-09-16 RX ADMIN — VITAMIN D, TAB 1000IU (100/BT) 1000 UNITS: 25 TAB at 08:01

## 2018-09-16 RX ADMIN — CARVEDILOL 3.12 MG: 3.12 TABLET, FILM COATED ORAL at 18:08

## 2018-09-16 RX ADMIN — QUETIAPINE 25 MG: 25 TABLET ORAL at 20:33

## 2018-09-16 RX ADMIN — ATORVASTATIN CALCIUM 80 MG: 40 TABLET, FILM COATED ORAL at 20:33

## 2018-09-16 RX ADMIN — TRAZODONE HYDROCHLORIDE 50 MG: 50 TABLET ORAL at 20:32

## 2018-09-16 RX ADMIN — ACETAMINOPHEN 650 MG: 325 TABLET, FILM COATED ORAL at 05:36

## 2018-09-16 RX ADMIN — DOCUSATE SODIUM 100 MG: 100 CAPSULE, LIQUID FILLED ORAL at 08:01

## 2018-09-16 RX ADMIN — DOCUSATE SODIUM AND SENNOSIDES 1 TABLET: 50; 8.6 TABLET, FILM COATED ORAL at 20:33

## 2018-09-16 RX ADMIN — ASPIRIN 81 MG 81 MG: 81 TABLET ORAL at 08:01

## 2018-09-16 RX ADMIN — QUETIAPINE 25 MG: 25 TABLET ORAL at 14:01

## 2018-09-16 RX ADMIN — ACETAMINOPHEN 650 MG: 325 TABLET, FILM COATED ORAL at 20:33

## 2018-09-16 RX ADMIN — ALUMINUM HYDROXIDE, MAGNESIUM HYDROXIDE, AND DIMETHICONE 20 ML: 400; 400; 40 SUSPENSION ORAL at 20:32

## 2018-09-16 RX ADMIN — METHOCARBAMOL 500 MG: 500 TABLET ORAL at 20:32

## 2018-09-16 RX ADMIN — MELATONIN TAB 3 MG 3 MG: 3 TAB at 20:33

## 2018-09-16 RX ADMIN — CARVEDILOL 3.12 MG: 3.12 TABLET, FILM COATED ORAL at 08:01

## 2018-09-16 RX ADMIN — ACETAMINOPHEN 650 MG: 325 TABLET, FILM COATED ORAL at 12:41

## 2018-09-16 RX ADMIN — ANALGESIC BALM: 1.74; 4.06 OINTMENT TOPICAL at 12:49

## 2018-09-16 ASSESSMENT — PAIN SCALES - WONG BAKER
WONGBAKER_NUMERICALRESPONSE: HURTS EVEN MORE
WONGBAKER_NUMERICALRESPONSE: HURTS JUST A LITTLE BIT

## 2018-09-16 ASSESSMENT — PAIN SCALES - GENERAL
PAINLEVEL_OUTOF10: 8
PAINLEVEL_OUTOF10: 0
PAINLEVEL_OUTOF10: 0

## 2018-09-16 NOTE — CARE PLAN
Problem: Safety  Goal: Will remain free from falls  Outcome: PROGRESSING AS EXPECTED  Pt is impulsive, agitated at times, leans to left when upright, oriented to self and sometimes place, high fall risk. Educating pt during encounters regarding safety, call light use for assist with transfers or toileting. In monitored room close to nursing station for close obs. Bed and chair alarms being used. Pt in w/c at nurses station at times for direct observation, lap belt utilized, non-skid socks or shoes and socks when oob, bed in low position. Free of falls as of this note.    Problem: Psychosocial Needs:  Goal: Level of anxiety will decrease  Outcome: PROGRESSING AS EXPECTED  Pt brother and son visiting after lunch. Pt frequently on call light, likes to talk a lot, have someone present in room. Emotional support and active listening provided by staff.

## 2018-09-16 NOTE — PROGRESS NOTES
"Rehab Progress Note     Date of Service: 9/16/2018  Chief Complaint: follow up stroke    Interval Events (Subjective)    Patient seen and examined in the hallways today. He is just come back from eating lunch. He wants to know when he can leave the hospital and thinks he is ready to go back to work. His very poor insight into his deficits. Did not sleep well last night per nursing staff. He is removed to the traumatic brain injury unit and has a wander guard bracelet in place    Objective:  VITAL SIGNS: /49   Pulse 75   Temp 36.8 °C (98.2 °F)   Resp 19   Ht 1.803 m (5' 11\")   Wt 111.2 kg (245 lb 2.4 oz)   SpO2 90%   BMI 34.19 kg/m²   Gen: alert, no apparent distress  CV: regular rate and rhythm, no murmurs, no peripheral edema  Resp: clear to ascultation bilaterally, normal respiratory effort  GI: soft, non-tender abdomen, bowel sounds present  Neuro: notable for poor insight into deficits      Recent Results (from the past 72 hour(s))   Basic Metabolic Panel (BMP)    Collection Time: 09/14/18  4:45 AM   Result Value Ref Range    Sodium 134 (L) 135 - 145 mmol/L    Potassium 4.2 3.6 - 5.5 mmol/L    Chloride 104 96 - 112 mmol/L    Co2 21 20 - 33 mmol/L    Glucose 103 (H) 65 - 99 mg/dL    Bun 20 8 - 22 mg/dL    Creatinine 0.73 0.50 - 1.40 mg/dL    Calcium 9.2 8.5 - 10.5 mg/dL    Anion Gap 9.0 0.0 - 11.9   ESTIMATED GFR    Collection Time: 09/14/18  4:45 AM   Result Value Ref Range    GFR If African American >60 >60 mL/min/1.73 m 2    GFR If Non African American >60 >60 mL/min/1.73 m 2   CBC with Differential    Collection Time: 09/14/18  4:46 AM   Result Value Ref Range    WBC 9.9 4.8 - 10.8 K/uL    RBC 5.48 4.70 - 6.10 M/uL    Hemoglobin 15.6 14.0 - 18.0 g/dL    Hematocrit 46.8 42.0 - 52.0 %    MCV 85.4 81.4 - 97.8 fL    MCH 28.5 27.0 - 33.0 pg    MCHC 33.3 (L) 33.7 - 35.3 g/dL    RDW 41.3 35.9 - 50.0 fL    Platelet Count 395 164 - 446 K/uL    MPV 9.7 9.0 - 12.9 fL    Neutrophils-Polys 71.00 44.00 - 72.00 " %    Lymphocytes 10.90 (L) 22.00 - 41.00 %    Monocytes 9.80 0.00 - 13.40 %    Eosinophils 5.70 0.00 - 6.90 %    Basophils 1.50 0.00 - 1.80 %    Immature Granulocytes 1.10 (H) 0.00 - 0.90 %    Nucleated RBC 0.00 /100 WBC    Neutrophils (Absolute) 7.04 1.82 - 7.42 K/uL    Lymphs (Absolute) 1.08 1.00 - 4.80 K/uL    Monos (Absolute) 0.97 (H) 0.00 - 0.85 K/uL    Eos (Absolute) 0.56 (H) 0.00 - 0.51 K/uL    Baso (Absolute) 0.15 (H) 0.00 - 0.12 K/uL    Immature Granulocytes (abs) 0.11 0.00 - 0.11 K/uL    NRBC (Absolute) 0.00 K/uL   BType Natriuretic Peptide (BNP)    Collection Time: 09/15/18  5:11 AM   Result Value Ref Range    B Natriuretic Peptide 16 0 - 100 pg/mL   CBC with Differential    Collection Time: 09/15/18  5:11 AM   Result Value Ref Range    WBC 8.9 4.8 - 10.8 K/uL    RBC 5.35 4.70 - 6.10 M/uL    Hemoglobin 15.6 14.0 - 18.0 g/dL    Hematocrit 46.7 42.0 - 52.0 %    MCV 87.3 81.4 - 97.8 fL    MCH 29.2 27.0 - 33.0 pg    MCHC 33.4 (L) 33.7 - 35.3 g/dL    RDW 42.4 35.9 - 50.0 fL    Platelet Count 360 164 - 446 K/uL    MPV 10.0 9.0 - 12.9 fL    Neutrophils-Polys 70.40 44.00 - 72.00 %    Lymphocytes 11.50 (L) 22.00 - 41.00 %    Monocytes 10.90 0.00 - 13.40 %    Eosinophils 4.60 0.00 - 6.90 %    Basophils 1.10 0.00 - 1.80 %    Immature Granulocytes 1.50 (H) 0.00 - 0.90 %    Nucleated RBC 0.00 /100 WBC    Neutrophils (Absolute) 6.26 1.82 - 7.42 K/uL    Lymphs (Absolute) 1.02 1.00 - 4.80 K/uL    Monos (Absolute) 0.97 (H) 0.00 - 0.85 K/uL    Eos (Absolute) 0.41 0.00 - 0.51 K/uL    Baso (Absolute) 0.10 0.00 - 0.12 K/uL    Immature Granulocytes (abs) 0.13 (H) 0.00 - 0.11 K/uL    NRBC (Absolute) 0.00 K/uL   Comp Metabolic Panel (CMP)    Collection Time: 09/15/18  5:11 AM   Result Value Ref Range    Sodium 139 135 - 145 mmol/L    Potassium 4.0 3.6 - 5.5 mmol/L    Chloride 105 96 - 112 mmol/L    Co2 26 20 - 33 mmol/L    Anion Gap 8.0 0.0 - 11.9    Glucose 84 65 - 99 mg/dL    Bun 18 8 - 22 mg/dL    Creatinine 0.80 0.50 - 1.40  mg/dL    Calcium 9.6 8.5 - 10.5 mg/dL    AST(SGOT) 14 12 - 45 U/L    ALT(SGPT) 25 2 - 50 U/L    Alkaline Phosphatase 65 30 - 99 U/L    Total Bilirubin 0.7 0.1 - 1.5 mg/dL    Albumin 4.0 3.2 - 4.9 g/dL    Total Protein 7.1 6.0 - 8.2 g/dL    Globulin 3.1 1.9 - 3.5 g/dL    A-G Ratio 1.3 g/dL   Magnesium    Collection Time: 09/15/18  5:11 AM   Result Value Ref Range    Magnesium 2.1 1.5 - 2.5 mg/dL   Vitamin D, 25-hydroxy (blood)    Collection Time: 09/15/18  5:11 AM   Result Value Ref Range    25-Hydroxy   Vitamin D 25 18 (L) 30 - 100 ng/mL   HEMOGLOBIN A1C    Collection Time: 09/15/18  5:11 AM   Result Value Ref Range    Glycohemoglobin 5.6 0.0 - 5.6 %    Est Avg Glucose 114 mg/dL   ESTIMATED GFR    Collection Time: 09/15/18  5:11 AM   Result Value Ref Range    GFR If African American >60 >60 mL/min/1.73 m 2    GFR If Non African American >60 >60 mL/min/1.73 m 2       Current Facility-Administered Medications   Medication Frequency   • melatonin tablet 3 mg QHS   • QUEtiapine (SEROQUEL) tablet 25 mg TID   • traZODone (DESYREL) tablet 50 mg QHS   • vitamin D (cholecalciferol) tablet 1,000 Units DAILY   • aspirin (ASA) chewable tab 81 mg DAILY   • atorvastatin (LIPITOR) tablet 80 mg Q EVENING   • acetaminophen (TYLENOL) tablet 650 mg Q4HRS PRN   • Respiratory Care per Protocol Continuous RT   • Pharmacy Consult Request ...Pain Management Review 1 Each PRN   • docusate sodium (COLACE) capsule 100 mg DAILY    And   • senna-docusate (PERICOLACE or SENOKOT S) 8.6-50 MG per tablet 1 Tab Q EVENING    And   • lactulose 20 GM/30ML solution 30 mL Q24HRS PRN    And   • bisacodyl (DULCOLAX) suppository 10 mg QDAY PRN   • artificial tears 1.4 % ophthalmic solution 1 Drop PRN   • hydrALAZINE (APRESOLINE) tablet 25 mg Q8HRS PRN   • mag hydrox-al hydrox-simeth (MAALOX PLUS ES or MYLANTA DS) suspension 20 mL Q2HRS PRN   • ondansetron (ZOFRAN ODT) dispertab 4 mg 4X/DAY PRN    Or   • ondansetron (ZOFRAN) syringe/vial injection 4 mg  4X/DAY PRN   • sodium chloride (OCEAN) 0.65 % nasal spray 2 Spray PRN   • carvedilol (COREG) tablet 3.125 mg BID WITH MEALS   • menthol-methyl salicylate (BENGAY) ointment TID PRN   • methocarbamol (ROBAXIN) tablet 500 mg 4X/DAY PRN       Orders Placed This Encounter   Procedures   • Diet Order Regular     Standing Status:   Standing     Number of Occurrences:   1     Order Specific Question:   Diet:     Answer:   Regular [1]     Order Specific Question:   Texture/Fiber modifications:     Answer:   Dysphagia 1(Pureed)specify fluid consistency(question 6) [1]     Order Specific Question:   Consistency/Fluid modifications:     Answer:   Nectar Thick [2]     Order Specific Question:   Miscellaneous modifications:     Answer:   SLP - 1:1 Supervision by Nursing [21]     Order Specific Question:   Miscellaneous modifications:     Answer:   SLP - Deliver to Nursing Station [22]       Assessment:  Active Hospital Problems    Diagnosis   • Stroke (HCC)   • Cerebral edema (HCC)   • Cognitive deficits   • Low vitamin D level   • Dysphagia as late effect of cerebrovascular accident (CVA)   • Cardiomyopathy (HCC)     This patient is a 53 y.o. male admitted for acute inpatient rehabilitation with moderate right MCA stroke with hemorrhagic conversion.    THIS PATIENT IS ON THE STROKE PATHWAY    Medical Decision Making and Plan:    Right MCA stroke with hemorrhagic conversion and edema  Continue full rehab program  PT/OT/SLP, 1 hr each discipline, 5 days per week  Continue baby aspirin and statin for secondary stroke prophylaxis    Impulsive  Move closer to nursing station  Move to TBI unit  Wanderguard in place  Schedule Seroquel TID  Schedule melatonin/trazodone at night     Systolic CHF  EF 45%  Consult hospitalist     PFO  Outpatient follow up with cardiology  Dopplers negative for DVT, so not cause of stroke etiology     Hypertension  Continue Coreg  Consult hospitalist     Hypertriglyceridemia   Continue statin     Back  pain  Acute onset in last week  Exam consistent with paraspinal muscle spasm  Therapy, bengay, hot pack  PRN robaxin  Avoid narcotics    Low Vit D, 18  Start supplementation    DVT prophylaxis:  Contraindicated given hemorrhagic conversion. Increase mobility. Monitor for edema.    Total time:  35 minutes.  I spent greater than 50% of the time for patient care, counseling, and coordination on this date, including patient face-to face time, unit/floor time with review of records/pertinent lab data and studies, as well as discussing diagnostic evaluation/work up, planned therapeutic interventions, and future disposition of care, as per the interval events/subjective and the assessment and plan as noted above.        Naomi Taylor M.D.

## 2018-09-16 NOTE — PROGRESS NOTES
Received shift report and assumed care of patient.  Patient awake, calm and stable, currently positioned in bed for comfort and safety; call light within reach. Alarms on bed ,  Denies pain or discomfort at this time.  Will continue to monitor.

## 2018-09-16 NOTE — CARE PLAN
Problem: Safety  Goal: Will remain free from injury  Pt remains impulsive with very poor safety awareness, is in room close to nursing station with camera, bed and wheel chair alarms in use, jazmyn guard also in use, pt redirected  And reoriented as needed, so far pt remains free of accidental injury. will continue to assess and monitor    Problem: Infection  Goal: Will remain free from infection  Patient remains free from s/s infection; afebrile.  Will continue to monitor.    Problem: Bowel/Gastric:  Goal: Normal bowel function is maintained or improved  Pt complained of heartburn medicated with maalox with good relief.

## 2018-09-17 PROBLEM — G81.94 LEFT HEMIPLEGIA (HCC): Status: ACTIVE | Noted: 2018-09-17

## 2018-09-17 PROCEDURE — G0515 COGNITIVE SKILLS DEVELOPMENT: HCPCS

## 2018-09-17 PROCEDURE — 97535 SELF CARE MNGMENT TRAINING: CPT

## 2018-09-17 PROCEDURE — 770010 HCHG ROOM/CARE - REHAB SEMI PRIVAT*

## 2018-09-17 PROCEDURE — 97530 THERAPEUTIC ACTIVITIES: CPT

## 2018-09-17 PROCEDURE — A9270 NON-COVERED ITEM OR SERVICE: HCPCS | Performed by: PHYSICAL MEDICINE & REHABILITATION

## 2018-09-17 PROCEDURE — 700102 HCHG RX REV CODE 250 W/ 637 OVERRIDE(OP): Performed by: PHYSICAL MEDICINE & REHABILITATION

## 2018-09-17 PROCEDURE — 99232 SBSQ HOSP IP/OBS MODERATE 35: CPT | Performed by: PHYSICAL MEDICINE & REHABILITATION

## 2018-09-17 PROCEDURE — 92526 ORAL FUNCTION THERAPY: CPT

## 2018-09-17 PROCEDURE — 97116 GAIT TRAINING THERAPY: CPT

## 2018-09-17 RX ORDER — DIVALPROEX SODIUM 125 MG/1
250 CAPSULE, COATED PELLETS ORAL EVERY 12 HOURS
Status: DISCONTINUED | OUTPATIENT
Start: 2018-09-17 | End: 2018-09-19

## 2018-09-17 RX ORDER — DIVALPROEX SODIUM 250 MG/1
250 TABLET, DELAYED RELEASE ORAL EVERY 12 HOURS
Status: DISCONTINUED | OUTPATIENT
Start: 2018-09-17 | End: 2018-09-17

## 2018-09-17 RX ADMIN — METHOCARBAMOL 500 MG: 500 TABLET ORAL at 20:12

## 2018-09-17 RX ADMIN — ALUMINUM HYDROXIDE, MAGNESIUM HYDROXIDE, AND DIMETHICONE 20 ML: 400; 400; 40 SUSPENSION ORAL at 21:30

## 2018-09-17 RX ADMIN — ACETAMINOPHEN 650 MG: 325 TABLET, FILM COATED ORAL at 20:12

## 2018-09-17 RX ADMIN — DIVALPROEX SODIUM 250 MG: 125 CAPSULE, COATED PELLETS ORAL at 20:12

## 2018-09-17 RX ADMIN — ATORVASTATIN CALCIUM 80 MG: 40 TABLET, FILM COATED ORAL at 20:12

## 2018-09-17 RX ADMIN — MELATONIN TAB 3 MG 3 MG: 3 TAB at 20:12

## 2018-09-17 RX ADMIN — METHOCARBAMOL 500 MG: 500 TABLET ORAL at 13:40

## 2018-09-17 RX ADMIN — QUETIAPINE 25 MG: 25 TABLET ORAL at 20:12

## 2018-09-17 RX ADMIN — TRAZODONE HYDROCHLORIDE 50 MG: 50 TABLET ORAL at 20:12

## 2018-09-17 RX ADMIN — QUETIAPINE 25 MG: 25 TABLET ORAL at 16:35

## 2018-09-17 RX ADMIN — CARVEDILOL 3.12 MG: 3.12 TABLET, FILM COATED ORAL at 20:14

## 2018-09-17 RX ADMIN — DOCUSATE SODIUM AND SENNOSIDES 1 TABLET: 50; 8.6 TABLET, FILM COATED ORAL at 20:12

## 2018-09-17 RX ADMIN — METHOCARBAMOL 500 MG: 500 TABLET ORAL at 07:45

## 2018-09-17 RX ADMIN — ACETAMINOPHEN 650 MG: 325 TABLET, FILM COATED ORAL at 13:40

## 2018-09-17 RX ADMIN — ACETAMINOPHEN 650 MG: 325 TABLET, FILM COATED ORAL at 07:45

## 2018-09-17 RX ADMIN — QUETIAPINE 25 MG: 25 TABLET ORAL at 07:45

## 2018-09-17 ASSESSMENT — PAIN SCALES - GENERAL: PAINLEVEL_OUTOF10: 4

## 2018-09-17 ASSESSMENT — PAIN SCALES - WONG BAKER
WONGBAKER_NUMERICALRESPONSE: HURTS EVEN MORE
WONGBAKER_NUMERICALRESPONSE: HURTS A LITTLE MORE
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
## 2018-09-17 NOTE — PROGRESS NOTES
"Rehab Progress Note     Encounter date: 9/17/2018  Today I met with the patient face to face in his room  Chief Complaint:  Stroke (HCC) , wants to go home    Interval Events (subjective)  Mr. Klein reports that he is doing well and would like to go home.  He does not recall why he would need to stay in the inpatient hospital.  He denies any fevers, chills, headache, dizziness, chest pain, or shortness of breath.  He reports some clumsiness in his left hand with significant prompting.  His family reports that he has been very perseverative.  Nursing reports that he has been extremely behavioral.    Objective:  VITAL SIGNS: /70   Pulse (!) 58   Temp 36.8 °C (98.2 °F)   Resp 18   Ht 1.803 m (5' 11\")   Wt 111.2 kg (245 lb 2.4 oz)   SpO2 94%   BMI 34.19 kg/m²     Recent Results (from the past 72 hour(s))   BType Natriuretic Peptide (BNP)    Collection Time: 09/15/18  5:11 AM   Result Value Ref Range    B Natriuretic Peptide 16 0 - 100 pg/mL   CBC with Differential    Collection Time: 09/15/18  5:11 AM   Result Value Ref Range    WBC 8.9 4.8 - 10.8 K/uL    RBC 5.35 4.70 - 6.10 M/uL    Hemoglobin 15.6 14.0 - 18.0 g/dL    Hematocrit 46.7 42.0 - 52.0 %    MCV 87.3 81.4 - 97.8 fL    MCH 29.2 27.0 - 33.0 pg    MCHC 33.4 (L) 33.7 - 35.3 g/dL    RDW 42.4 35.9 - 50.0 fL    Platelet Count 360 164 - 446 K/uL    MPV 10.0 9.0 - 12.9 fL    Neutrophils-Polys 70.40 44.00 - 72.00 %    Lymphocytes 11.50 (L) 22.00 - 41.00 %    Monocytes 10.90 0.00 - 13.40 %    Eosinophils 4.60 0.00 - 6.90 %    Basophils 1.10 0.00 - 1.80 %    Immature Granulocytes 1.50 (H) 0.00 - 0.90 %    Nucleated RBC 0.00 /100 WBC    Neutrophils (Absolute) 6.26 1.82 - 7.42 K/uL    Lymphs (Absolute) 1.02 1.00 - 4.80 K/uL    Monos (Absolute) 0.97 (H) 0.00 - 0.85 K/uL    Eos (Absolute) 0.41 0.00 - 0.51 K/uL    Baso (Absolute) 0.10 0.00 - 0.12 K/uL    Immature Granulocytes (abs) 0.13 (H) 0.00 - 0.11 K/uL    NRBC (Absolute) 0.00 K/uL   Comp Metabolic Panel (CMP) "    Collection Time: 09/15/18  5:11 AM   Result Value Ref Range    Sodium 139 135 - 145 mmol/L    Potassium 4.0 3.6 - 5.5 mmol/L    Chloride 105 96 - 112 mmol/L    Co2 26 20 - 33 mmol/L    Anion Gap 8.0 0.0 - 11.9    Glucose 84 65 - 99 mg/dL    Bun 18 8 - 22 mg/dL    Creatinine 0.80 0.50 - 1.40 mg/dL    Calcium 9.6 8.5 - 10.5 mg/dL    AST(SGOT) 14 12 - 45 U/L    ALT(SGPT) 25 2 - 50 U/L    Alkaline Phosphatase 65 30 - 99 U/L    Total Bilirubin 0.7 0.1 - 1.5 mg/dL    Albumin 4.0 3.2 - 4.9 g/dL    Total Protein 7.1 6.0 - 8.2 g/dL    Globulin 3.1 1.9 - 3.5 g/dL    A-G Ratio 1.3 g/dL   Magnesium    Collection Time: 09/15/18  5:11 AM   Result Value Ref Range    Magnesium 2.1 1.5 - 2.5 mg/dL   Vitamin D, 25-hydroxy (blood)    Collection Time: 09/15/18  5:11 AM   Result Value Ref Range    25-Hydroxy   Vitamin D 25 18 (L) 30 - 100 ng/mL   HEMOGLOBIN A1C    Collection Time: 09/15/18  5:11 AM   Result Value Ref Range    Glycohemoglobin 5.6 0.0 - 5.6 %    Est Avg Glucose 114 mg/dL   ESTIMATED GFR    Collection Time: 09/15/18  5:11 AM   Result Value Ref Range    GFR If African American >60 >60 mL/min/1.73 m 2    GFR If Non African American >60 >60 mL/min/1.73 m 2       Current Facility-Administered Medications   Medication Frequency   • melatonin tablet 3 mg QHS   • QUEtiapine (SEROQUEL) tablet 25 mg TID   • traZODone (DESYREL) tablet 50 mg QHS   • vitamin D (cholecalciferol) tablet 1,000 Units DAILY   • aspirin (ASA) chewable tab 81 mg DAILY   • atorvastatin (LIPITOR) tablet 80 mg Q EVENING   • acetaminophen (TYLENOL) tablet 650 mg Q4HRS PRN   • Respiratory Care per Protocol Continuous RT   • Pharmacy Consult Request ...Pain Management Review 1 Each PRN   • docusate sodium (COLACE) capsule 100 mg DAILY    And   • senna-docusate (PERICOLACE or SENOKOT S) 8.6-50 MG per tablet 1 Tab Q EVENING    And   • lactulose 20 GM/30ML solution 30 mL Q24HRS PRN    And   • bisacodyl (DULCOLAX) suppository 10 mg QDAY PRN   • artificial tears 1.4  % ophthalmic solution 1 Drop PRN   • hydrALAZINE (APRESOLINE) tablet 25 mg Q8HRS PRN   • mag hydrox-al hydrox-simeth (MAALOX PLUS ES or MYLANTA DS) suspension 20 mL Q2HRS PRN   • ondansetron (ZOFRAN ODT) dispertab 4 mg 4X/DAY PRN    Or   • ondansetron (ZOFRAN) syringe/vial injection 4 mg 4X/DAY PRN   • sodium chloride (OCEAN) 0.65 % nasal spray 2 Spray PRN   • carvedilol (COREG) tablet 3.125 mg BID WITH MEALS   • menthol-methyl salicylate (BENGAY) ointment TID PRN   • methocarbamol (ROBAXIN) tablet 500 mg 4X/DAY PRN       Exam Date: 9/17/2018    General:  Awake, alert, no acute distress  Cardiac: regular rate and rhythm  Lungs: clear to auscultation bilaterally.   Abdomen: soft; non tender, non distended, bowel sounds present and normoactive  Neuro:   Perseverative on returning home, hypersexual with frequent joking, some dysarthria, poor insight into deficits, left hemiparesis    Orders Placed This Encounter   Procedures   • Diet Order Regular     Standing Status:   Standing     Number of Occurrences:   1     Order Specific Question:   Diet:     Answer:   Regular [1]     Order Specific Question:   Texture/Fiber modifications:     Answer:   Dysphagia 1(Pureed)specify fluid consistency(question 6) [1]     Order Specific Question:   Consistency/Fluid modifications:     Answer:   Nectar Thick [2]     Order Specific Question:   Miscellaneous modifications:     Answer:   SLP - 1:1 Supervision by Nursing [21]     Order Specific Question:   Miscellaneous modifications:     Answer:   SLP - Deliver to Nursing Station [22]       Assessment:  Active Hospital Problems    Diagnosis   • Stroke (HCC)   • Cerebral edema (HCC)   • Cognitive deficits   • Low vitamin D level   • Dysphagia as late effect of cerebrovascular accident (CVA)   • Cardiomyopathy (HCC)       Medical Decision Making and Plan:  Mr. Klein is a 53-year-old male admitted for rehabilitation on September 14 due to stroke with hemorrhagic conversion    THIS PATIENT IS  ON THE STROKE PATHWAY    Right MCA stroke with hemorrhagic conversion and edema  Left hemiparesis  Impulsivity  Cognitive deficits  Poor insight  Continue comprehensive rehabilitation   On aspirin and statin for secondary prophylaxis     Continue scheduled Seroquel 25 mg tid  Depakote 250 mg bid for mood/behavior stabilization    Melatonin 3 mg at bedtime   Trazodone 50 mg at bedtime  If sleep remains an issue, increase nightly Seroquel    Patient's family has requested discharge as soon as possible due to the patient's father's terminal diagnosis  Patient's brother reports he is able to provide 24 hour supervision with combination of family and friends     Systolic CHF  EF 45%  PFO  Consult hospitalist  Outpatient follow up with cardiology     Hypertension  Bradycardia  Coreg 3.125 mg twice a day  Blood pressure stable at 102/70 mmHg today  Pulse is 55 today  Continue to monitor while on beta-blocker  Consult hospitalist     Hypertriglyceridemia   Continue statin     Back pain  Leg cramps  Therapy, bengay, hot pack  PRN robaxin  Avoid narcotics due to cognitive status     Vitamin D deficiency  Vitamin D was 18 on admission, so we began supplementation with cholecalciferol 1000 units daily      DVT prophylaxis:  Contraindicated given hemorrhagic conversion. Increase mobility. Monitor for edema.    Estimated discharge: 14 days    Total time:  29 minutes.  I spent greater than 50% of the time for patient care, counseling, and coordination on this date, including unit/floor time, and face-to-face time with the patient as per interval events and assessment and plan above. Topics discussed included functional status, discharge planning, behavioral concerns.  Discussed with LITO Jean M.D.  9/17/2018

## 2018-09-17 NOTE — PROGRESS NOTES
Received bedside shift report from Angie EWING RN regarding patient and assumed care. Patient awake, calm and stable, currently positioned in bed for comfort and safety; call light within reach. Denies pain or discomfort at this time. Will continue to monitor.

## 2018-09-17 NOTE — PROGRESS NOTES
"Patient is very impulsive and becoming more verbally abusive with this RN and CNA. He threw his chair alarm down on the floor. He wants to go to bed then to the bathroom over and over. He has trouble calming down even with firm redirection and instruction to not \"yell\" out of the room. Charge RN aware of patients very difficult behaviors and impulsiveness. Scheduled Seroquel given with tylenol.  "

## 2018-09-17 NOTE — CARE PLAN
"Problem: Communication  Goal: The ability to communicate needs accurately and effectively will improve    Intervention: Reorient patient to environment as needed  Perseverating on having his \"Vitals taken this am\". He has been mentioning this since the CNA took his am vitals. He did not like the feeling of the BP cuff. Attempting to redirect frequently without success.        "

## 2018-09-17 NOTE — PROGRESS NOTES
Pt is confused, curses at staff and impulsive.  Sleeping intermittently during the night.  Bed alarm in place.  Q hourly rounding done.  Patient's room close to nurse's station.

## 2018-09-17 NOTE — CARE PLAN
Problem: Communication  Goal: The ability to communicate needs accurately and effectively will improve  Encouraged to make needs known to staff and to use call light for staff assist.  Pt does not use call light for assistance.  Always kept within reach.    Problem: Safety  Goal: Will remain free from falls  Call light kept within reach and encouraged to use for assistance and with toileting needs. Encouraged to call staff and to wait for staff before transfers.  Q hourly rounding done.  Bed alarm is in place.  Room close to nurse's station.  Also tv monitored.

## 2018-09-18 ENCOUNTER — APPOINTMENT (OUTPATIENT)
Dept: PAIN MANAGEMENT | Facility: REHABILITATION | Age: 53
DRG: 056 | End: 2018-09-18
Attending: PHYSICAL MEDICINE & REHABILITATION
Payer: COMMERCIAL

## 2018-09-18 ENCOUNTER — APPOINTMENT (OUTPATIENT)
Dept: RADIOLOGY | Facility: REHABILITATION | Age: 53
DRG: 056 | End: 2018-09-18
Attending: PHYSICAL MEDICINE & REHABILITATION
Payer: COMMERCIAL

## 2018-09-18 PROCEDURE — 700102 HCHG RX REV CODE 250 W/ 637 OVERRIDE(OP): Performed by: PHYSICAL MEDICINE & REHABILITATION

## 2018-09-18 PROCEDURE — A9270 NON-COVERED ITEM OR SERVICE: HCPCS

## 2018-09-18 PROCEDURE — 97116 GAIT TRAINING THERAPY: CPT

## 2018-09-18 PROCEDURE — A9270 NON-COVERED ITEM OR SERVICE: HCPCS | Performed by: PHYSICAL MEDICINE & REHABILITATION

## 2018-09-18 PROCEDURE — 92526 ORAL FUNCTION THERAPY: CPT

## 2018-09-18 PROCEDURE — 97112 NEUROMUSCULAR REEDUCATION: CPT

## 2018-09-18 PROCEDURE — 92611 MOTION FLUOROSCOPY/SWALLOW: CPT

## 2018-09-18 PROCEDURE — 99232 SBSQ HOSP IP/OBS MODERATE 35: CPT | Performed by: PHYSICAL MEDICINE & REHABILITATION

## 2018-09-18 PROCEDURE — 97110 THERAPEUTIC EXERCISES: CPT

## 2018-09-18 PROCEDURE — 97535 SELF CARE MNGMENT TRAINING: CPT

## 2018-09-18 PROCEDURE — 74230 X-RAY XM SWLNG FUNCJ C+: CPT

## 2018-09-18 PROCEDURE — 700112 HCHG RX REV CODE 229: Performed by: PHYSICAL MEDICINE & REHABILITATION

## 2018-09-18 PROCEDURE — 700111 HCHG RX REV CODE 636 W/ 250 OVERRIDE (IP): Performed by: PHYSICAL MEDICINE & REHABILITATION

## 2018-09-18 PROCEDURE — 700102 HCHG RX REV CODE 250 W/ 637 OVERRIDE(OP)

## 2018-09-18 PROCEDURE — 770010 HCHG ROOM/CARE - REHAB SEMI PRIVAT*

## 2018-09-18 PROCEDURE — 97530 THERAPEUTIC ACTIVITIES: CPT

## 2018-09-18 RX ORDER — QUETIAPINE FUMARATE 25 MG/1
25 TABLET, FILM COATED ORAL
Status: DISCONTINUED | OUTPATIENT
Start: 2018-09-18 | End: 2018-09-19 | Stop reason: HOSPADM

## 2018-09-18 RX ORDER — TRAZODONE HYDROCHLORIDE 50 MG/1
50 TABLET ORAL ONCE
Status: COMPLETED | OUTPATIENT
Start: 2018-09-18 | End: 2018-09-18

## 2018-09-18 RX ORDER — QUETIAPINE FUMARATE 100 MG/1
100 TABLET, FILM COATED ORAL EVERY EVENING
Status: DISCONTINUED | OUTPATIENT
Start: 2018-09-18 | End: 2018-09-19 | Stop reason: HOSPADM

## 2018-09-18 RX ORDER — TRAZODONE HYDROCHLORIDE 50 MG/1
TABLET ORAL
Status: COMPLETED
Start: 2018-09-18 | End: 2018-09-18

## 2018-09-18 RX ORDER — QUETIAPINE FUMARATE 25 MG/1
25 TABLET, FILM COATED ORAL EVERY 24 HOURS
Status: DISCONTINUED | OUTPATIENT
Start: 2018-09-18 | End: 2018-09-19 | Stop reason: HOSPADM

## 2018-09-18 RX ADMIN — ACETAMINOPHEN 650 MG: 325 TABLET, FILM COATED ORAL at 20:02

## 2018-09-18 RX ADMIN — CARVEDILOL 3.12 MG: 3.12 TABLET, FILM COATED ORAL at 07:34

## 2018-09-18 RX ADMIN — ACETAMINOPHEN 650 MG: 325 TABLET, FILM COATED ORAL at 03:46

## 2018-09-18 RX ADMIN — QUETIAPINE FUMARATE 100 MG: 100 TABLET ORAL at 20:02

## 2018-09-18 RX ADMIN — METHOCARBAMOL 500 MG: 500 TABLET ORAL at 18:49

## 2018-09-18 RX ADMIN — DOCUSATE SODIUM 100 MG: 100 CAPSULE, LIQUID FILLED ORAL at 07:34

## 2018-09-18 RX ADMIN — ONDANSETRON 4 MG: 4 TABLET, ORALLY DISINTEGRATING ORAL at 19:23

## 2018-09-18 RX ADMIN — DIVALPROEX SODIUM 250 MG: 125 CAPSULE, COATED PELLETS ORAL at 07:34

## 2018-09-18 RX ADMIN — ASPIRIN 81 MG 81 MG: 81 TABLET ORAL at 07:34

## 2018-09-18 RX ADMIN — METHOCARBAMOL 500 MG: 500 TABLET ORAL at 07:34

## 2018-09-18 RX ADMIN — DOCUSATE SODIUM AND SENNOSIDES 1 TABLET: 50; 8.6 TABLET, FILM COATED ORAL at 20:02

## 2018-09-18 RX ADMIN — ALUMINUM HYDROXIDE, MAGNESIUM HYDROXIDE, AND DIMETHICONE 20 ML: 400; 400; 40 SUSPENSION ORAL at 20:01

## 2018-09-18 RX ADMIN — QUETIAPINE 25 MG: 25 TABLET ORAL at 14:06

## 2018-09-18 RX ADMIN — TRAZODONE HYDROCHLORIDE 50 MG: 50 TABLET ORAL at 23:29

## 2018-09-18 RX ADMIN — METHOCARBAMOL 500 MG: 500 TABLET ORAL at 03:46

## 2018-09-18 RX ADMIN — TRAZODONE HYDROCHLORIDE 50 MG: 50 TABLET ORAL at 22:55

## 2018-09-18 RX ADMIN — METHOCARBAMOL 500 MG: 500 TABLET ORAL at 14:06

## 2018-09-18 RX ADMIN — MELATONIN TAB 3 MG 3 MG: 3 TAB at 20:02

## 2018-09-18 RX ADMIN — DIVALPROEX SODIUM 250 MG: 125 CAPSULE, COATED PELLETS ORAL at 20:02

## 2018-09-18 RX ADMIN — ACETAMINOPHEN 650 MG: 325 TABLET, FILM COATED ORAL at 14:06

## 2018-09-18 RX ADMIN — QUETIAPINE 25 MG: 25 TABLET ORAL at 07:34

## 2018-09-18 RX ADMIN — VITAMIN D, TAB 1000IU (100/BT) 1000 UNITS: 25 TAB at 07:34

## 2018-09-18 RX ADMIN — ATORVASTATIN CALCIUM 80 MG: 40 TABLET, FILM COATED ORAL at 20:02

## 2018-09-18 RX ADMIN — CARVEDILOL 3.12 MG: 3.12 TABLET, FILM COATED ORAL at 17:03

## 2018-09-18 ASSESSMENT — PAIN SCALES - WONG BAKER
WONGBAKER_NUMERICALRESPONSE: HURTS A LITTLE MORE
WONGBAKER_NUMERICALRESPONSE: HURTS EVEN MORE
WONGBAKER_NUMERICALRESPONSE: HURTS EVEN MORE
WONGBAKER_NUMERICALRESPONSE: HURTS JUST A LITTLE BIT

## 2018-09-18 ASSESSMENT — PAIN SCALES - GENERAL: PAINLEVEL_OUTOF10: 9

## 2018-09-18 NOTE — REHAB-PHARMACY IDT TEAM NOTE
Pharmacy   Pharmacy  Antibiotics/Antifungals/Antivirals:  Medication:      Active Orders     None        Route:         n/a  Stop Date:  n/a  Reason:   Antihypertensives/Cardiac:  Medication:    Orders (72h ago through future)    Start     Ordered    09/15/18 2100  atorvastatin (LIPITOR) tablet 80 mg  EVERY EVENING      09/15/18 1116    09/14/18 2100  atorvastatin (LIPITOR) tablet 80 mg  EVERY EVENING,   Status:  Discontinued      09/14/18 1449    09/14/18 1730  carvedilol (COREG) tablet 3.125 mg  2 TIMES DAILY WITH MEALS      09/14/18 1449    09/14/18 1015  hydrALAZINE (APRESOLINE) tablet 25 mg  EVERY 8 HOURS PRN      09/14/18 1018        Patient Vitals for the past 24 hrs:   BP Pulse   09/18/18 0900 123/80 80   09/18/18 0630 - 96   09/17/18 1840 101/74 72   09/17/18 1600 122/78 90       Anticoagulation:  Medication:  Aspirin,   INR:      Other key medications: Methocarbamol, Quetiapine    A review of the medication list reveals no issues at this time. Patient is currently on antihypertensive(s). Recommend home blood pressure monitoring/recording if antihypertensive(s) regimen(s) continue.    Section completed by:  Jennifer Hines RPH

## 2018-09-18 NOTE — PROGRESS NOTES
0715: Bedside report received, assumed care for this patient.  Patient is A&O x 3 - situation.  VSS.  Communication board updated, call light and belongings are within reach.  Bed is in low position. Patient appears in no distress, and has no complaints of SOB and 0/10 of pain.  Will be medicated per MAR.  Plan of care discussed and agreed upon with patient.  Patient extremely agitated, screaming and yelling, cursing  Despite comfort from multiple staff members.  Wants medications .   Meds given, including seroquel.

## 2018-09-18 NOTE — REHAB-OT IDT TEAM NOTE
Occupational Therapy   Activities of Daily Living  Eating Initial:  5 - Standby Prompting/Supervision or Set-up  Eating Current:  5 - Standby Prompting/Supervision or Set-up   Eating Description:  Supervision for safety, Verbal cueing  Grooming Initial:  5 - Standby Prompting/Supervision or Set-up  Grooming Current:  5 - Standby Prompting/Supervision or Set-up   Grooming Description:  Set-up of equipment, Supervision for safety  Bathing Initial:  3 - Moderate Assistance  Bathing Current:  5 - Standby Prompting/Supervision or Set-up   Bathing Description:  Tub bench, Hand held shower, Supervision for safety, Verbal cueing, Set-up of equipment, Initial preparation for task (setup and SBA/supervised with cues for safety)  Upper Body Dressing Initial:  2 - Max Assistance  Upper Body Dressing Current:  5 - Standby Prompting/Supervision or Set-up   Upper Body Dressing Description:  Set-up of equipment, Supervision for safety, Increased time (setup and supervised with increased time)  Lower Body Dressing Initial:  1 - Total Assistance  Lower Body Dressing Current:  5 - Standby Prompting/Supervsion or Set-up   Lower Body Dressing Description:  5 - Standby Prompting/Supervsion or Set-up  Toileting Initial:  2 - Max Assistance  Toileting Current:  5 - Standby Prompting/Supervision or Set-up   Toileting Description:  Supervision for safety (SBA to stand and void in toilet)  Toilet Transfer Initial:  3 - Moderate Assistance  Toilet Transfer Current:  5 - Standby Prompting/Supervision or Set-up   Toilet Transfer Description:  5 - Standby Prompting/Supervision or Set-up  Tub / Shower Transfer Initial:  2 - Max Assistance  Tub / Shower Transfer Current:  5 - Standby Prompting/Supervision or Set-up   Tub / Shower Transfer Description:  Verbal cueing, Set-up of equipment, Initial preparation for task, Supervision for safety, Increased time, Grab bar, Adaptive equipment  IADL:  Not yet addressed   Family Training/Education:  Son and  brother present during some sessions and do well with encouraging patient to do more for self; no hands on training  DME/DC Recommendations:  Recommend 24 hour supervision upon d/c    Strengths:  Adequate strength, Alert and oriented, Independent PLOF, Making steady progress towards goals, Manages pain appropriately, Motivated for self care and independence, Pleasant and cooperative, Supportive family and Willingly participates in therapeutic activities  Barriers:  Decreased endurance, Generalized weakness, Impulsive, Limited mobility, Poor insight/denial of deficits and Other: left side weakness, impaired standing balance, father is ill back home, patient perseverates on discharging home to see his dying father     # of short term goals set= 5    # of short term goals met= 4         Occupational Therapy Goals           Problem: OT Long Term Goals     Dates: Start: 09/15/18       Goal: LTG-By discharge, patient will complete basic self care tasks     Dates: Start: 09/15/18       Description: 1) Individualized Goal:  At a supv to mod I level using DME and AE as needed.  2) Interventions:   OT E Stim Attended, OT Group Therapy, OT Self Care/ADL, OT Cognitive Skill Dev, OT Community Reintegration, OT Manual Ther Technique, OT Neuro Re-Ed/Balance, OT Sensory Int Techniques, OT Therapeutic Activity, OT Aquatic Therapy, OT Evaluation and OT Therapeutic Exercise.             Goal: LTG-By discharge, patient will perform bathroom transfers     Dates: Start: 09/15/18       Description: 1) Individualized Goal:  At a supv to mod I level using DME and AE as needed.  2) Interventions:   OT E Stim Attended, OT Group Therapy, OT Self Care/ADL, OT Cognitive Skill Dev, OT Community Reintegration, OT Manual Ther Technique, OT Neuro Re-Ed/Balance, OT Sensory Int Techniques, OT Therapeutic Activity, OT Aquatic Therapy, OT Evaluation and OT Therapeutic Exercise.             Goal: LTG-By discharge, patient will complete basic home  management     Dates: Start: 09/15/18       Description: 1) Individualized Goal:  Necessary for DC at a min A to mod I level.  2) Interventions:   OT E Stim Attended, OT Group Therapy, OT Self Care/ADL, OT Cognitive Skill Dev, OT Community Reintegration, OT Manual Ther Technique, OT Neuro Re-Ed/Balance, OT Sensory Int Techniques, OT Therapeutic Activity, OT Aquatic Therapy, OT Evaluation and OT Therapeutic Exercise.               Problem: Toileting     Dates: Start: 09/15/18       Goal: STG-Within one week, patient will complete toileting tasks     Dates: Start: 09/15/18       Description: 1) Individualized Goal:  With supv and set up using DME and AE as needed.  2) Interventions:   OT E Stim Attended, OT Group Therapy, OT Self Care/ADL, OT Cognitive Skill Dev, OT Community Reintegration, OT Manual Ther Technique, OT Neuro Re-Ed/Balance, OT Sensory Int Techniques, OT Therapeutic Activity, OT Aquatic Therapy, OT Evaluation and OT Therapeutic Exercise.       Note:     Goal Note filed on 09/18/18 1467 by Pablito Bunch MS,OTR/L    Goal: STG-Within one week, patient will complete toileting tasks  Outcome: NOT MET  Setup and SBA                      Section completed by:  Pablito Bunch MS,OTR/L

## 2018-09-18 NOTE — CARE PLAN
"Problem: Pain Management  Goal: Pain level will decrease to patient's comfort goal  Patient c/o h/a at times throughout day of 9-10 /10.  States tylenol and robaxing helps this.  Satisfied with being given these meds.      Problem: Psychosocial Needs:  Goal: Level of anxiety will decrease  Patient alternates between anger/agression, irritability, banging/kicking, and restlessness; then hyperverbosity and perseverating/and being aggressive with being \"in the faces,\" so to speak, of employees and staff. For example, patient had to be reminded several times to leave the restricted area of the nurses station, and stated \"when you ask me to do something it only makes me do it more\" and then came back in the restricted area several times. This behavior is mitigated by distraction with therapies and conversation, and then being medicated with seroquel 3x daily.    MD aware.    Night time seroquel dose increased r/t patient being in and out of sleep last night.      "

## 2018-09-18 NOTE — PROGRESS NOTES
Pt continues with cursing at staff.  Pt sleeps in 1-2 hour intervals and is impulsive.  Bed alarm remains in place.  And Q hourly rounding in place.

## 2018-09-18 NOTE — REHAB-CM IDT TEAM NOTE
Case Management      DC Planning    DC destination/dispostion:  Patient lives alone in the [place] outside [place], [place]. Plan is for him to go home with his brother in [place], [place]. Home is one story.     Referrals: Assist to find new PCP.     DC Needs: Patient has no DME. Will need therapy which may be delayed due to establishing with a new PCP. Patient had loop recorder placed and will need cardiology follow up. I anticipate patient will need 24 hour supervision, and family confirms they can provide this.     Barriers to discharge:  No local physician.    Strengths: Good family support and able to provide care.     Section completed by:  Chikis Bradley R.N.
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
## 2018-09-18 NOTE — PROGRESS NOTES
Received bedside shift report from Maci TAPIA RN regarding patient and assumed care. Patient awake, calm and stable, currently positioned in bed for comfort and safety; call light within reach. Denies pain or discomfort at this time. Will continue to monitor.

## 2018-09-18 NOTE — PROGRESS NOTES
"Rehab Progress Note     Encounter date: 9/18/2018  Today I met with the patient face to face in therapy  Chief Complaint:  Stroke (HCC) , perseveration on discharging home    Interval Events (subjective)  Mr. Klein feels that he is back to his baseline and would like to return home as soon as possible.  He is very upset about not being able to visit his father.  He denies any significant difficulty with memory, behavior, or neuromuscular function.  He has very poor insight into his deficits.  Nursing reports that he is still having difficulty sleeping at night, but the Seroquel is helping somewhat.  He has remained behavioral during the day today and has some episodes of aggressive perseveration.    Objective:  VITAL SIGNS: /80   Pulse 80   Temp 36.7 °C (98 °F)   Resp 18   Ht 1.803 m (5' 11\")   Wt 111.2 kg (245 lb 2.4 oz)   SpO2 92%   BMI 34.19 kg/m²     No results found for this or any previous visit (from the past 72 hour(s)).    Current Facility-Administered Medications   Medication Frequency   • Divalproex Sodium (DEPAKOTE) capsule 250 mg Q12HRS   • melatonin tablet 3 mg QHS   • QUEtiapine (SEROQUEL) tablet 25 mg TID   • traZODone (DESYREL) tablet 50 mg QHS   • vitamin D (cholecalciferol) tablet 1,000 Units DAILY   • aspirin (ASA) chewable tab 81 mg DAILY   • atorvastatin (LIPITOR) tablet 80 mg Q EVENING   • acetaminophen (TYLENOL) tablet 650 mg Q4HRS PRN   • Respiratory Care per Protocol Continuous RT   • Pharmacy Consult Request ...Pain Management Review 1 Each PRN   • docusate sodium (COLACE) capsule 100 mg DAILY    And   • senna-docusate (PERICOLACE or SENOKOT S) 8.6-50 MG per tablet 1 Tab Q EVENING    And   • lactulose 20 GM/30ML solution 30 mL Q24HRS PRN    And   • bisacodyl (DULCOLAX) suppository 10 mg QDAY PRN   • artificial tears 1.4 % ophthalmic solution 1 Drop PRN   • hydrALAZINE (APRESOLINE) tablet 25 mg Q8HRS PRN   • mag hydrox-al hydrox-simeth (MAALOX PLUS ES or MYLANTA DS) suspension " 20 mL Q2HRS PRN   • ondansetron (ZOFRAN ODT) dispertab 4 mg 4X/DAY PRN    Or   • ondansetron (ZOFRAN) syringe/vial injection 4 mg 4X/DAY PRN   • sodium chloride (OCEAN) 0.65 % nasal spray 2 Spray PRN   • carvedilol (COREG) tablet 3.125 mg BID WITH MEALS   • menthol-methyl salicylate (BENGAY) ointment TID PRN   • methocarbamol (ROBAXIN) tablet 500 mg 4X/DAY PRN       Exam Date: 9/18/2018    General:  Awake, alert, oriented, no acute distress  Cardiac: regular rate and rhythm  Lungs: clear to auscultation bilaterally.   Abdomen: soft; non tender, non distended, bowel sounds present and normoactive  Extremities: No edema in the bilateral lower limbs  Neuro:   Perseverates on returning home.  Mildly aggressive at times.  Responds to redirection.  Agitated.      Orders Placed This Encounter   Procedures   • Diet Order Regular     Standing Status:   Standing     Number of Occurrences:   1     Order Specific Question:   Diet:     Answer:   Regular [1]     Order Specific Question:   Texture/Fiber modifications:     Answer:   Dysphagia 1(Pureed)specify fluid consistency(question 6) [1]     Order Specific Question:   Consistency/Fluid modifications:     Answer:   Nectar Thick [2]     Order Specific Question:   Miscellaneous modifications:     Answer:   SLP - 1:1 Supervision by Nursing [21]     Order Specific Question:   Miscellaneous modifications:     Answer:   SLP - Deliver to Nursing Station [22]       Assessment:  Active Hospital Problems    Diagnosis   • Stroke (HCC)   • Cerebral edema (HCC)   • Cognitive deficits   • Low vitamin D level   • Dysphagia as late effect of cerebrovascular accident (CVA)   • Cardiomyopathy (HCC)       Medical Decision Making and Plan:  Mr. 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  Left hemiparesis  Impulsivity  Cognitive  deficits  Poor insight  Continue comprehensive rehabilitation   On aspirin and statin for secondary prophylaxis     Continue scheduled Seroquel 25 mg morning and mid day and 100 mg qhs--increased 9/18  Depakote 250 mg bid for mood/behavior stabilization--added 9/17    Melatonin 3 mg at bedtime   Trazodone discontinued with seroquel increase     Patient's family has requested discharge as soon as possible due to the patient's father's terminal diagnosis  Patient's brother reports he is able to provide 24 hour supervision with combination of family and friends    I discussed tomorrow's IDT with the patient today    Discussed with SLP today.  Advanced to regular diet     Systolic CHF  EF 45%  PFO  Consult hospitalist  Outpatient follow up with cardiology     Hypertension  Bradycardia  Coreg 3.125 mg twice a day  Blood pressure stable at 123/80 mmHg today  Pulse is 80 today  Continue to monitor while on beta-blocker  Consult hospitalist     Hypertriglyceridemia   Continue statin     Back pain  Leg cramps  Therapy, bengay, hot pack  PRN robaxin  Avoid narcotics due to cognitive status    Subjectively improved today     Vitamin D deficiency  Vitamin D was 18 on admission, so we began supplementation with cholecalciferol 1000 units daily      DVT prophylaxis:  Contraindicated given hemorrhagic conversion. Increase mobility. Monitor for edema.    Estimated discharge: 14 days    Total time:  32 minutes.  I spent greater than 50% of the time for patient care, counseling, and coordination on this date, including unit/floor time, and face-to-face time with the patient as per interval events and assessment and plan above. Topics discussed included functional progress, discharge planning, IDT, and I discussed his care with nursing.      Vincent Jean M.D.  9/18/2018

## 2018-09-18 NOTE — CARE PLAN
Problem: Bathing  Goal: STG-Within one week, patient will bathe  1) Individualized Goal:  With supv and set up using DME and AE as needed.  2) Interventions:   OT E Stim Attended, OT Group Therapy, OT Self Care/ADL, OT Cognitive Skill Dev, OT Community Reintegration, OT Manual Ther Technique, OT Neuro Re-Ed/Balance, OT Sensory Int Techniques, OT Therapeutic Activity, OT Aquatic Therapy, OT Evaluation and OT Therapeutic Exercise.       Outcome: MET Date Met: 09/18/18  Setup and supervision    Problem: Dressing  Goal: STG-Within one week, patient will dress UB  1) Individualized Goal:  With supv and min A as needed.  2) Interventions:   OT E Stim Attended, OT Group Therapy, OT Self Care/ADL, OT Cognitive Skill Dev, OT Community Reintegration, OT Manual Ther Technique, OT Neuro Re-Ed/Balance, OT Sensory Int Techniques, OT Therapeutic Activity, OT Aquatic Therapy, OT Evaluation and OT Therapeutic Exercise.     Outcome: MET Date Met: 09/18/18  Setup and supervised with min verbal cue  Goal: STG-Within one week, patient will dress LB  1) Individualized Goal:  With mod A using AE as needed.  2) Interventions:   OT E Stim Attended, OT Group Therapy, OT Self Care/ADL, OT Cognitive Skill Dev, OT Community Reintegration, OT Manual Ther Technique, OT Neuro Re-Ed/Balance, OT Sensory Int Techniques, OT Therapeutic Activity, OT Aquatic Therapy, OT Evaluation and OT Therapeutic Exercise.     Outcome: MET Date Met: 09/18/18  Setup and supervised with min cues    Problem: Toileting  Goal: STG-Within one week, patient will complete toileting tasks  1) Individualized Goal:  With supv and set up using DME and AE as needed.  2) Interventions:   OT E Stim Attended, OT Group Therapy, OT Self Care/ADL, OT Cognitive Skill Dev, OT Community Reintegration, OT Manual Ther Technique, OT Neuro Re-Ed/Balance, OT Sensory Int Techniques, OT Therapeutic Activity, OT Aquatic Therapy, OT Evaluation and OT Therapeutic Exercise.     Outcome: NOT  MET  Setup and SBA    Problem: Functional Cognition  Goal: STG-Within one week, patient will demonstrate safety awareness  1) Individualized Goal:  During ADL as evidenced by requiring only min vc for safety.  2) Interventions:   OT E Stim Attended, OT Group Therapy, OT Self Care/ADL, OT Cognitive Skill Dev, OT Community Reintegration, OT Manual Ther Technique, OT Neuro Re-Ed/Balance, OT Sensory Int Techniques, OT Therapeutic Activity, OT Aquatic Therapy, OT Evaluation and OT Therapeutic Exercise.     Outcome: MET Date Met: 09/18/18  Min VC's for safety

## 2018-09-19 ENCOUNTER — HOSPITAL ENCOUNTER (INPATIENT)
Facility: MEDICAL CENTER | Age: 53
LOS: 4 days | DRG: 064 | End: 2018-09-23
Attending: EMERGENCY MEDICINE | Admitting: FAMILY MEDICINE
Payer: COMMERCIAL

## 2018-09-19 ENCOUNTER — APPOINTMENT (OUTPATIENT)
Dept: RADIOLOGY | Facility: MEDICAL CENTER | Age: 53
DRG: 064 | End: 2018-09-19
Attending: EMERGENCY MEDICINE
Payer: COMMERCIAL

## 2018-09-19 ENCOUNTER — APPOINTMENT (OUTPATIENT)
Dept: RADIOLOGY | Facility: MEDICAL CENTER | Age: 53
DRG: 064 | End: 2018-09-19
Attending: HOSPITALIST
Payer: COMMERCIAL

## 2018-09-19 VITALS
TEMPERATURE: 97.8 F | DIASTOLIC BLOOD PRESSURE: 63 MMHG | BODY MASS INDEX: 34.32 KG/M2 | OXYGEN SATURATION: 94 % | SYSTOLIC BLOOD PRESSURE: 97 MMHG | HEIGHT: 71 IN | WEIGHT: 245.15 LBS | HEART RATE: 67 BPM | RESPIRATION RATE: 17 BRPM

## 2018-09-19 DIAGNOSIS — I63.9 ACUTE ISCHEMIC STROKE (HCC): ICD-10-CM

## 2018-09-19 DIAGNOSIS — I63.9 CEREBROVASCULAR ACCIDENT (CVA), UNSPECIFIED MECHANISM (HCC): ICD-10-CM

## 2018-09-19 DIAGNOSIS — R56.9 SEIZURE (HCC): ICD-10-CM

## 2018-09-19 PROBLEM — R41.82 ALTERED MENTAL STATUS: Status: ACTIVE | Noted: 2018-09-19

## 2018-09-19 LAB
ABO GROUP BLD: NORMAL
ALBUMIN SERPL BCP-MCNC: 4 G/DL (ref 3.2–4.9)
ALBUMIN/GLOB SERPL: 1.4 G/DL
ALP SERPL-CCNC: 68 U/L (ref 30–99)
ALT SERPL-CCNC: 24 U/L (ref 2–50)
ANION GAP SERPL CALC-SCNC: 9 MMOL/L (ref 0–11.9)
APPEARANCE UR: CLEAR
APTT PPP: 29.6 SEC (ref 24.7–36)
AST SERPL-CCNC: 17 U/L (ref 12–45)
BACTERIA #/AREA URNS HPF: NEGATIVE /HPF
BASOPHILS # BLD AUTO: 1.3 % (ref 0–1.8)
BASOPHILS # BLD: 0.11 K/UL (ref 0–0.12)
BILIRUB SERPL-MCNC: 0.5 MG/DL (ref 0.1–1.5)
BILIRUB UR QL STRIP.AUTO: NEGATIVE
BLD GP AB SCN SERPL QL: NORMAL
BUN SERPL-MCNC: 15 MG/DL (ref 8–22)
CALCIUM SERPL-MCNC: 9 MG/DL (ref 8.5–10.5)
CHLORIDE SERPL-SCNC: 104 MMOL/L (ref 96–112)
CO2 SERPL-SCNC: 25 MMOL/L (ref 20–33)
COLOR UR: YELLOW
CREAT SERPL-MCNC: 1.01 MG/DL (ref 0.5–1.4)
EKG IMPRESSION: NORMAL
EOSINOPHIL # BLD AUTO: 0.36 K/UL (ref 0–0.51)
EOSINOPHIL NFR BLD: 4.1 % (ref 0–6.9)
EPI CELLS #/AREA URNS HPF: NEGATIVE /HPF
ERYTHROCYTE [DISTWIDTH] IN BLOOD BY AUTOMATED COUNT: 43 FL (ref 35.9–50)
GLOBULIN SER CALC-MCNC: 2.8 G/DL (ref 1.9–3.5)
GLUCOSE SERPL-MCNC: 85 MG/DL (ref 65–99)
GLUCOSE UR STRIP.AUTO-MCNC: NEGATIVE MG/DL
HCT VFR BLD AUTO: 44.7 % (ref 42–52)
HGB BLD-MCNC: 15 G/DL (ref 14–18)
HYALINE CASTS #/AREA URNS LPF: ABNORMAL /LPF
IMM GRANULOCYTES # BLD AUTO: 0.04 K/UL (ref 0–0.11)
IMM GRANULOCYTES NFR BLD AUTO: 0.5 % (ref 0–0.9)
INR PPP: 1.11 (ref 0.87–1.13)
KETONES UR STRIP.AUTO-MCNC: NEGATIVE MG/DL
LEUKOCYTE ESTERASE UR QL STRIP.AUTO: NEGATIVE
LYMPHOCYTES # BLD AUTO: 0.92 K/UL (ref 1–4.8)
LYMPHOCYTES NFR BLD: 10.5 % (ref 22–41)
MCH RBC QN AUTO: 29.6 PG (ref 27–33)
MCHC RBC AUTO-ENTMCNC: 33.6 G/DL (ref 33.7–35.3)
MCV RBC AUTO: 88.2 FL (ref 81.4–97.8)
MICRO URNS: ABNORMAL
MONOCYTES # BLD AUTO: 1.15 K/UL (ref 0–0.85)
MONOCYTES NFR BLD AUTO: 13.2 % (ref 0–13.4)
NEUTROPHILS # BLD AUTO: 6.15 K/UL (ref 1.82–7.42)
NEUTROPHILS NFR BLD: 70.4 % (ref 44–72)
NITRITE UR QL STRIP.AUTO: NEGATIVE
NRBC # BLD AUTO: 0 K/UL
NRBC BLD-RTO: 0 /100 WBC
PH UR STRIP.AUTO: 7 [PH]
PLATELET # BLD AUTO: 368 K/UL (ref 164–446)
PMV BLD AUTO: 10 FL (ref 9–12.9)
POTASSIUM SERPL-SCNC: 4.3 MMOL/L (ref 3.6–5.5)
PROT SERPL-MCNC: 6.8 G/DL (ref 6–8.2)
PROT UR QL STRIP: NEGATIVE MG/DL
PROTHROMBIN TIME: 14.4 SEC (ref 12–14.6)
RBC # BLD AUTO: 5.07 M/UL (ref 4.7–6.1)
RBC # URNS HPF: ABNORMAL /HPF
RBC UR QL AUTO: ABNORMAL
RH BLD: NORMAL
SODIUM SERPL-SCNC: 138 MMOL/L (ref 135–145)
SP GR UR STRIP.AUTO: 1.01
TROPONIN I SERPL-MCNC: <0.01 NG/ML (ref 0–0.04)
UROBILINOGEN UR STRIP.AUTO-MCNC: 0.2 MG/DL
WBC # BLD AUTO: 8.7 K/UL (ref 4.8–10.8)
WBC #/AREA URNS HPF: ABNORMAL /HPF

## 2018-09-19 PROCEDURE — 770020 HCHG ROOM/CARE - TELE (206)

## 2018-09-19 PROCEDURE — 70496 CT ANGIOGRAPHY HEAD: CPT

## 2018-09-19 PROCEDURE — 85025 COMPLETE CBC W/AUTO DIFF WBC: CPT

## 2018-09-19 PROCEDURE — 700111 HCHG RX REV CODE 636 W/ 250 OVERRIDE (IP): Performed by: PSYCHIATRY & NEUROLOGY

## 2018-09-19 PROCEDURE — 700105 HCHG RX REV CODE 258: Performed by: FAMILY MEDICINE

## 2018-09-19 PROCEDURE — 84484 ASSAY OF TROPONIN QUANT: CPT

## 2018-09-19 PROCEDURE — 86901 BLOOD TYPING SEROLOGIC RH(D): CPT

## 2018-09-19 PROCEDURE — 97530 THERAPEUTIC ACTIVITIES: CPT

## 2018-09-19 PROCEDURE — 86900 BLOOD TYPING SEROLOGIC ABO: CPT

## 2018-09-19 PROCEDURE — 86850 RBC ANTIBODY SCREEN: CPT

## 2018-09-19 PROCEDURE — 70498 CT ANGIOGRAPHY NECK: CPT

## 2018-09-19 PROCEDURE — 96375 TX/PRO/DX INJ NEW DRUG ADDON: CPT

## 2018-09-19 PROCEDURE — 97535 SELF CARE MNGMENT TRAINING: CPT

## 2018-09-19 PROCEDURE — 0042T CT-CEREBRAL PERFUSION ANALYSIS: CPT

## 2018-09-19 PROCEDURE — 82550 ASSAY OF CK (CPK): CPT

## 2018-09-19 PROCEDURE — 93005 ELECTROCARDIOGRAM TRACING: CPT | Performed by: EMERGENCY MEDICINE

## 2018-09-19 PROCEDURE — 96376 TX/PRO/DX INJ SAME DRUG ADON: CPT

## 2018-09-19 PROCEDURE — 99285 EMERGENCY DEPT VISIT HI MDM: CPT

## 2018-09-19 PROCEDURE — 99221 1ST HOSP IP/OBS SF/LOW 40: CPT | Performed by: PSYCHIATRY & NEUROLOGY

## 2018-09-19 PROCEDURE — 85730 THROMBOPLASTIN TIME PARTIAL: CPT

## 2018-09-19 PROCEDURE — 94760 N-INVAS EAR/PLS OXIMETRY 1: CPT

## 2018-09-19 PROCEDURE — 70450 CT HEAD/BRAIN W/O DYE: CPT

## 2018-09-19 PROCEDURE — A9270 NON-COVERED ITEM OR SERVICE: HCPCS | Performed by: PHYSICAL MEDICINE & REHABILITATION

## 2018-09-19 PROCEDURE — 84443 ASSAY THYROID STIM HORMONE: CPT

## 2018-09-19 PROCEDURE — 99223 1ST HOSP IP/OBS HIGH 75: CPT | Performed by: FAMILY MEDICINE

## 2018-09-19 PROCEDURE — 700102 HCHG RX REV CODE 250 W/ 637 OVERRIDE(OP): Performed by: HOSPITALIST

## 2018-09-19 PROCEDURE — 92526 ORAL FUNCTION THERAPY: CPT

## 2018-09-19 PROCEDURE — 99239 HOSP IP/OBS DSCHRG MGMT >30: CPT | Performed by: PHYSICAL MEDICINE & REHABILITATION

## 2018-09-19 PROCEDURE — 96374 THER/PROPH/DIAG INJ IV PUSH: CPT

## 2018-09-19 PROCEDURE — 80053 COMPREHEN METABOLIC PANEL: CPT

## 2018-09-19 PROCEDURE — 700112 HCHG RX REV CODE 229: Performed by: PHYSICAL MEDICINE & REHABILITATION

## 2018-09-19 PROCEDURE — 81001 URINALYSIS AUTO W/SCOPE: CPT

## 2018-09-19 PROCEDURE — 36415 COLL VENOUS BLD VENIPUNCTURE: CPT

## 2018-09-19 PROCEDURE — 700102 HCHG RX REV CODE 250 W/ 637 OVERRIDE(OP): Performed by: PHYSICAL MEDICINE & REHABILITATION

## 2018-09-19 PROCEDURE — A9270 NON-COVERED ITEM OR SERVICE: HCPCS | Performed by: HOSPITALIST

## 2018-09-19 PROCEDURE — 71045 X-RAY EXAM CHEST 1 VIEW: CPT

## 2018-09-19 PROCEDURE — 85610 PROTHROMBIN TIME: CPT

## 2018-09-19 PROCEDURE — 700102 HCHG RX REV CODE 250 W/ 637 OVERRIDE(OP): Performed by: FAMILY MEDICINE

## 2018-09-19 PROCEDURE — 700105 HCHG RX REV CODE 258: Performed by: PSYCHIATRY & NEUROLOGY

## 2018-09-19 PROCEDURE — A9270 NON-COVERED ITEM OR SERVICE: HCPCS | Performed by: FAMILY MEDICINE

## 2018-09-19 PROCEDURE — 74018 RADEX ABDOMEN 1 VIEW: CPT

## 2018-09-19 RX ORDER — ASPIRIN 81 MG/1
81 TABLET, CHEWABLE ORAL DAILY
Status: DISCONTINUED | OUTPATIENT
Start: 2018-09-19 | End: 2018-09-23 | Stop reason: HOSPADM

## 2018-09-19 RX ORDER — DOCUSATE SODIUM 100 MG/1
100 CAPSULE, LIQUID FILLED ORAL DAILY
Status: CANCELLED | OUTPATIENT
Start: 2018-09-20

## 2018-09-19 RX ORDER — METHOCARBAMOL 500 MG/1
500 TABLET, FILM COATED ORAL 4 TIMES DAILY PRN
Status: CANCELLED | OUTPATIENT
Start: 2018-09-19

## 2018-09-19 RX ORDER — LACTULOSE 20 G/30ML
30 SOLUTION ORAL
Status: CANCELLED | OUTPATIENT
Start: 2018-09-19

## 2018-09-19 RX ORDER — ONDANSETRON 4 MG/1
4 TABLET, ORALLY DISINTEGRATING ORAL 4 TIMES DAILY PRN
COMMUNITY

## 2018-09-19 RX ORDER — POLYETHYLENE GLYCOL 3350 17 G/17G
1 POWDER, FOR SOLUTION ORAL
Status: DISCONTINUED | OUTPATIENT
Start: 2018-09-19 | End: 2018-09-23 | Stop reason: HOSPADM

## 2018-09-19 RX ORDER — BISACODYL 10 MG
10 SUPPOSITORY, RECTAL RECTAL
Status: DISCONTINUED | OUTPATIENT
Start: 2018-09-19 | End: 2018-09-23 | Stop reason: HOSPADM

## 2018-09-19 RX ORDER — ATORVASTATIN CALCIUM 40 MG/1
80 TABLET, FILM COATED ORAL EVERY EVENING
Status: CANCELLED | OUTPATIENT
Start: 2018-09-19

## 2018-09-19 RX ORDER — TRAZODONE HYDROCHLORIDE 50 MG/1
50 TABLET ORAL NIGHTLY
Status: ON HOLD | COMMUNITY
End: 2018-09-23

## 2018-09-19 RX ORDER — ATORVASTATIN CALCIUM 80 MG/1
80 TABLET, FILM COATED ORAL EVERY EVENING
Status: DISCONTINUED | OUTPATIENT
Start: 2018-09-19 | End: 2018-09-23 | Stop reason: HOSPADM

## 2018-09-19 RX ORDER — ATORVASTATIN CALCIUM 80 MG/1
80 TABLET, FILM COATED ORAL NIGHTLY
COMMUNITY

## 2018-09-19 RX ORDER — SODIUM CHLORIDE 9 MG/ML
INJECTION, SOLUTION INTRAVENOUS CONTINUOUS
Status: DISCONTINUED | OUTPATIENT
Start: 2018-09-19 | End: 2018-09-20

## 2018-09-19 RX ORDER — ACETAMINOPHEN 650 MG/1
650 SUPPOSITORY RECTAL ONCE
Status: COMPLETED | OUTPATIENT
Start: 2018-09-19 | End: 2018-09-19

## 2018-09-19 RX ORDER — BISACODYL 10 MG
10 SUPPOSITORY, RECTAL RECTAL
Status: CANCELLED | OUTPATIENT
Start: 2018-09-19

## 2018-09-19 RX ORDER — ASPIRIN 300 MG/1
300 SUPPOSITORY RECTAL DAILY
Status: DISCONTINUED | OUTPATIENT
Start: 2018-09-19 | End: 2018-09-23 | Stop reason: HOSPADM

## 2018-09-19 RX ORDER — DOCUSATE SODIUM 100 MG/1
100 CAPSULE, LIQUID FILLED ORAL DAILY
COMMUNITY

## 2018-09-19 RX ORDER — ACETAMINOPHEN 325 MG/1
650 TABLET ORAL EVERY 4 HOURS PRN
Status: CANCELLED | OUTPATIENT
Start: 2018-09-19

## 2018-09-19 RX ORDER — DIVALPROEX SODIUM 125 MG/1
250 CAPSULE, COATED PELLETS ORAL EVERY 8 HOURS
Status: DISCONTINUED | OUTPATIENT
Start: 2018-09-19 | End: 2018-09-19 | Stop reason: HOSPADM

## 2018-09-19 RX ORDER — LANOLIN ALCOHOL/MO/W.PET/CERES
3 CREAM (GRAM) TOPICAL
COMMUNITY

## 2018-09-19 RX ORDER — HYDRALAZINE HYDROCHLORIDE 20 MG/ML
10 INJECTION INTRAMUSCULAR; INTRAVENOUS
Status: DISCONTINUED | OUTPATIENT
Start: 2018-09-19 | End: 2018-09-23 | Stop reason: HOSPADM

## 2018-09-19 RX ORDER — ECHINACEA PURPUREA EXTRACT 125 MG
2 TABLET ORAL PRN
Status: CANCELLED | OUTPATIENT
Start: 2018-09-19

## 2018-09-19 RX ORDER — HYDRALAZINE HYDROCHLORIDE 25 MG/1
25 TABLET, FILM COATED ORAL EVERY 8 HOURS PRN
Status: CANCELLED | OUTPATIENT
Start: 2018-09-19

## 2018-09-19 RX ORDER — QUETIAPINE FUMARATE 25 MG/1
25 TABLET, FILM COATED ORAL
Status: CANCELLED | OUTPATIENT
Start: 2018-09-20

## 2018-09-19 RX ORDER — LORAZEPAM 2 MG/ML
1 INJECTION INTRAMUSCULAR ONCE
Status: COMPLETED | OUTPATIENT
Start: 2018-09-19 | End: 2018-09-19

## 2018-09-19 RX ORDER — TRAZODONE HYDROCHLORIDE 50 MG/1
100 TABLET ORAL
Status: DISCONTINUED | OUTPATIENT
Start: 2018-09-19 | End: 2018-09-19 | Stop reason: HOSPADM

## 2018-09-19 RX ORDER — POLYVINYL ALCOHOL 14 MG/ML
1 SOLUTION/ DROPS OPHTHALMIC PRN
Status: CANCELLED | OUTPATIENT
Start: 2018-09-19

## 2018-09-19 RX ORDER — ONDANSETRON 4 MG/1
4 TABLET, ORALLY DISINTEGRATING ORAL 4 TIMES DAILY PRN
Status: CANCELLED | OUTPATIENT
Start: 2018-09-19

## 2018-09-19 RX ORDER — DIVALPROEX SODIUM 125 MG/1
250 CAPSULE, COATED PELLETS ORAL EVERY 8 HOURS
Status: CANCELLED | OUTPATIENT
Start: 2018-09-19

## 2018-09-19 RX ORDER — ASPIRIN 81 MG/1
81 TABLET, CHEWABLE ORAL DAILY
COMMUNITY

## 2018-09-19 RX ORDER — METHOCARBAMOL 500 MG/1
500 TABLET, FILM COATED ORAL 4 TIMES DAILY PRN
COMMUNITY

## 2018-09-19 RX ORDER — ONDANSETRON 2 MG/ML
4 INJECTION INTRAMUSCULAR; INTRAVENOUS 4 TIMES DAILY PRN
Status: CANCELLED | OUTPATIENT
Start: 2018-09-19

## 2018-09-19 RX ORDER — AMOXICILLIN 250 MG
2 CAPSULE ORAL 2 TIMES DAILY
Status: DISCONTINUED | OUTPATIENT
Start: 2018-09-19 | End: 2018-09-23 | Stop reason: HOSPADM

## 2018-09-19 RX ORDER — SENNOSIDES 8.6 MG
650 CAPSULE ORAL EVERY 4 HOURS PRN
COMMUNITY

## 2018-09-19 RX ORDER — QUETIAPINE FUMARATE 25 MG/1
25 TABLET, FILM COATED ORAL EVERY 24 HOURS
Status: CANCELLED | OUTPATIENT
Start: 2018-09-19

## 2018-09-19 RX ORDER — TRAZODONE HYDROCHLORIDE 50 MG/1
100 TABLET ORAL
Status: CANCELLED | OUTPATIENT
Start: 2018-09-19

## 2018-09-19 RX ORDER — ASPIRIN 81 MG/1
81 TABLET, CHEWABLE ORAL DAILY
Status: CANCELLED | OUTPATIENT
Start: 2018-09-20

## 2018-09-19 RX ORDER — LANOLIN ALCOHOL/MO/W.PET/CERES
3 CREAM (GRAM) TOPICAL
Status: CANCELLED | OUTPATIENT
Start: 2018-09-19

## 2018-09-19 RX ORDER — AMOXICILLIN 250 MG
1 CAPSULE ORAL EVERY EVENING
Status: CANCELLED | OUTPATIENT
Start: 2018-09-19

## 2018-09-19 RX ORDER — ALUMINA, MAGNESIA, AND SIMETHICONE 2400; 2400; 240 MG/30ML; MG/30ML; MG/30ML
20 SUSPENSION ORAL
Status: CANCELLED | OUTPATIENT
Start: 2018-09-19

## 2018-09-19 RX ORDER — QUETIAPINE FUMARATE 100 MG/1
100 TABLET, FILM COATED ORAL EVERY EVENING
Status: CANCELLED | OUTPATIENT
Start: 2018-09-19

## 2018-09-19 RX ORDER — ANALGESIC BALM 1.74; 4.06 G/29G; G/29G
OINTMENT TOPICAL 3 TIMES DAILY PRN
Status: CANCELLED | OUTPATIENT
Start: 2018-09-19

## 2018-09-19 RX ORDER — ALUMINA, MAGNESIA, AND SIMETHICONE 2400; 2400; 240 MG/30ML; MG/30ML; MG/30ML
20 SUSPENSION ORAL
COMMUNITY

## 2018-09-19 RX ORDER — CARVEDILOL 3.12 MG/1
3.12 TABLET ORAL 2 TIMES DAILY WITH MEALS
Status: CANCELLED | OUTPATIENT
Start: 2018-09-19

## 2018-09-19 RX ORDER — LABETALOL HYDROCHLORIDE 5 MG/ML
10 INJECTION, SOLUTION INTRAVENOUS EVERY 4 HOURS PRN
Status: DISCONTINUED | OUTPATIENT
Start: 2018-09-19 | End: 2018-09-23 | Stop reason: HOSPADM

## 2018-09-19 RX ADMIN — ACETAMINOPHEN 650 MG: 650 SUPPOSITORY RECTAL at 22:15

## 2018-09-19 RX ADMIN — DOCUSATE SODIUM 100 MG: 100 CAPSULE, LIQUID FILLED ORAL at 08:01

## 2018-09-19 RX ADMIN — ASPIRIN 300 MG: 300 SUPPOSITORY RECTAL at 19:50

## 2018-09-19 RX ADMIN — DIVALPROEX SODIUM 250 MG: 125 CAPSULE, COATED PELLETS ORAL at 08:01

## 2018-09-19 RX ADMIN — SODIUM CHLORIDE 750 MG: 9 INJECTION, SOLUTION INTRAVENOUS at 20:27

## 2018-09-19 RX ADMIN — CARVEDILOL 3.12 MG: 3.12 TABLET, FILM COATED ORAL at 08:01

## 2018-09-19 RX ADMIN — LORAZEPAM 1 MG: 2 INJECTION INTRAMUSCULAR; INTRAVENOUS at 15:30

## 2018-09-19 RX ADMIN — SODIUM CHLORIDE 3000 MG: 9 INJECTION, SOLUTION INTRAVENOUS at 15:47

## 2018-09-19 RX ADMIN — METHOCARBAMOL 500 MG: 500 TABLET ORAL at 08:01

## 2018-09-19 RX ADMIN — ASPIRIN 81 MG 81 MG: 81 TABLET ORAL at 08:02

## 2018-09-19 RX ADMIN — QUETIAPINE 25 MG: 25 TABLET ORAL at 08:01

## 2018-09-19 RX ADMIN — SODIUM CHLORIDE: 9 INJECTION, SOLUTION INTRAVENOUS at 20:27

## 2018-09-19 RX ADMIN — VITAMIN D, TAB 1000IU (100/BT) 1000 UNITS: 25 TAB at 08:01

## 2018-09-19 ASSESSMENT — ENCOUNTER SYMPTOMS
HEMOPTYSIS: 0
BLURRED VISION: 0
WEAKNESS: 1
MYALGIAS: 0
LOSS OF CONSCIOUSNESS: 0
SEIZURES: 1
PALPITATIONS: 0
DOUBLE VISION: 0
NECK PAIN: 0
SPEECH CHANGE: 1
NAUSEA: 0
DEPRESSION: 0
COUGH: 0
VOMITING: 0
BRUISES/BLEEDS EASILY: 0
FEVER: 0
FOCAL WEAKNESS: 1
HEADACHES: 0
DIZZINESS: 0
CHILLS: 0
HEARTBURN: 0

## 2018-09-19 ASSESSMENT — PATIENT HEALTH QUESTIONNAIRE - PHQ9
1. LITTLE INTEREST OR PLEASURE IN DOING THINGS: NOT AT ALL
SUM OF ALL RESPONSES TO PHQ9 QUESTIONS 1 AND 2: 0
2. FEELING DOWN, DEPRESSED, IRRITABLE, OR HOPELESS: NOT AT ALL

## 2018-09-19 ASSESSMENT — ACTIVITIES OF DAILY LIVING (ADL)
TOILETING_LEVEL_OF_ASSIST: REQUIRES SUPERVISION WITH TOILETING
TOILET_TRANSFER_LEVEL_OF_ASSIST: REQUIRES SUPERVISION WITH TOILET TRANSFER
SHOWER_TRANSFER_LEVEL_OF_ASSIST: REQUIRES SUPERVISION WITH SHOWER TRANSFER

## 2018-09-19 ASSESSMENT — PAIN SCALES - GENERAL
PAINLEVEL_OUTOF10: 0
PAINLEVEL_OUTOF10: 9

## 2018-09-19 NOTE — REHAB-COLLABORATIVE ONGOING IDT TEAM NOTE
Weekly Interdisciplinary Team Conference Note    Weekly Interdisciplinary Team Conference # 1  Date:  9/19/2018    Clinicians present and reporting at team conference include the following:   MD: Vincent Jean MD   RN:  Olivia Morton, JAMIA   PT:   Lupe Sandhu, PT, DPT  OT:  Pablito Bunch, MS, OTR/L   ST:  Chantelle Lewis, MS, CCC-SLP  CM:  Judi Guerrero RN, CCM  REC:  None  RT:  None  RPh:  Renny Simpson yusra  Other:   None  All reporting clinicians have a working knowledge of this patient's plan of care.    Targeted DC Date:  9/20/18     Medical    Patient Active Problem List    Diagnosis Date Noted   • Stroke (HCC) 09/06/2018     Priority: High   • Cerebral edema (HCC) 09/06/2018     Priority: High   • Left hemiplegia (HCC) 09/17/2018   • Cognitive deficits 09/15/2018   • Low vitamin D level 09/15/2018   • Dysphagia as late effect of cerebrovascular accident (CVA) 09/10/2018   • Cardiomyopathy (HCC) 09/08/2018     Results     ** No results found for the last 24 hours. **           Nursing  Diet/Nutrition:  Regular and Thin Liquids  Medication Administration:  Whole with Liquid Wash  % consumed at meals in last 24 hours:  Consumed % of meals per documentation.  Meal/Snack Consumption for the past 24 hrs:   Oral Nutrition   09/18/18 1315 Lunch;Between % Consumed       Snack schedule:  None Ordered  Supplement:  Other: N/A  Appetite:  Good  Fluid Intake/Output in past 24 hours: In: 620 [P.O.:620]  Out: 850 Pt also up to the toilet to void.  Admit Weight:  Weight: 112 kg (246 lb 14.6 oz)  Weight Last 7 Days   [111.2 kg (245 lb 2.4 oz)-112 kg (246 lb 14.6 oz)] 111.2 kg (245 lb 2.4 oz) (09/16 0500)    Pain Issues:    Location:  Back;Buttock;Head (09/18 2102)  Left;Posterior;Right (09/18 2102)         Severity:  Moderate   Scheduled pain medications:  None     PRN pain medications used in last 24 hours:  acetaminophen (TYLENOL)  Robaxin  Non Pharmacologic Interventions:  heating pad to  back  Effectiveness of pain management plan:  good=patient states acceptable comfort after interventions    Bowel:    Bowel Assist Initial Score:  3 - Moderate Assistance  Bowel Assist Current Score:  6 - Modified Independent  Bowl Accidents in last 7 days:     Last bowel movement: 09/17/18  Stool Description: Large, Formed, Hard     Usual bowel pattern:  daily  Scheduled bowel medications:  docusate sodium (COLACE) and senna-docusate (PERICOLACE or SENOKOT S)   PRN bowel medications used in last 24 hours:  None  Nursing Interventions:  Increased time, Positioning on commode/toilet, Supervision, Verbal cueing, Set-up  Effectiveness of bowel program:   good=regular, predictable, controlled emptying of bowel  Bladder:    Bladder Assist Initial Score:  3 - Moderate Assistance  Bladder Assist Current Score:  1 - Total Assistance  Bladder Accidents in last 7 days:  2  PVR range for past 24-48 hours: -- ()  Intermittent Catheterization:  N/A  Medications affecting bladder:  None    Time void schedule/voiding pattern:  Voiding every 3-4  hours  Interventions:  Increased time, Supervision, Verbal cueing, Emptying of device, Urinal, Time void patient initiated  Effectiveness of bladder training:  Good=regular, predictable, emptying of bladder, patient initiates time voiding    Sleep/wake cycle:   Average hours slept:  Restless throughout night  Pt has been intermittently sleeping during the night.  Sleep medication usage:  Other Scheduled Melatonin every bedtime.  Called on call Dr.Keating saleh 9/18/18 for sleep medication.  Order received for Trazodone x 1 with repeat x 1.  Pt restless tonight, as with previous nights, multiple attempts to get out of bed-pt is with unsteady gait.  Verbally abusive and threatening staff, kicking wheelchair multiple times tonight.    Patient/Family Training/Education:  Fall Prevention, General Self Care, Medication Management, Safe Transfers and Safety  Discharge Supply  Recommendations:  Other: Adaptive Equipment  Strengths: Supportive family   Barriers:   Unable to follow instructions, Bladder incontinence, Confused, Generalized weakness, Impulsive, Limited mobility, Poor sleep pattern and Uncooperative            Nursing Problems           Problem: Bowel/Gastric:     Goal: Normal bowel function is maintained or improved           Goal: Will not experience complications related to bowel motility             Problem: Communication     Goal: The ability to communicate needs accurately and effectively will improve             Problem: Discharge Barriers/Planning     Goal: Patient's continuum of care needs will be met             Problem: Infection     Goal: Will remain free from infection             Problem: Knowledge Deficit     Goal: Knowledge of disease process/condition, treatment plan, diagnostic tests, and medications will improve           Goal: Knowledge of the prescribed therapeutic regimen will improve             Problem: Mobility     Goal: Risk for activity intolerance will decrease             Problem: Pain Management     Goal: Pain level will decrease to patient's comfort goal             Problem: Psychosocial Needs:     Goal: Level of anxiety will decrease             Problem: Respiratory:     Goal: Respiratory status will improve             Problem: Safety     Goal: Will remain free from injury           Goal: Will remain free from falls             Problem: Skin Integrity     Goal: Risk for impaired skin integrity will decrease             Problem: Venous Thromboembolism (VTW)/Deep Vein Thrombosis (DVT) Prevention:     Goal: Patient will participate in Venous Thrombosis (VTE)/Deep Vein Thrombosis (DVT)Prevention Measures                  Long Term Goals:   At discharge patient will be able to function safely at home and in the community with support.    Section completed by:  Candice North L.P.N.2             Mobility  Bed mobility:   5  Bed /Chair/Wheelchair  Transfer Initial:  4 - Minimal Assistance  Bed /Chair/Wheelchair Transfer Current:  5 - Standby Prompting/Supervision or Set-up   Bed/Chair/Wheelchair Transfer Description:  Increased time, Supervision for safety, Verbal cueing, Set-up of equipment (SPT SBA, bed mobility SPV)  Walk Initial:  1 - Total Assistance  Walk Current:  4 - Minimal Assistance   Walk Description:  Extra time, Supervision for safety, Safety concerns, Walker, Verbal cueing (150' x 1 and 225' x 1 FWW CGA/Anastacia for walker management, vc for inc L step length. poor safety awareness and walker management)  Wheelchair Initial:  1 - Total Assistance  Wheelchair Current:  2 - Max Assistance   Wheelchair Description:   (120ft x 1, B UE, cues for attn to task)  Stairs Initial:  0 - Not tested,unsafe activity  Stairs Current: 0 - Not tested,unsafe activity   Stairs Description:    Patient/Family Training/Education:  CLOF, gait mechanics  DME/DC Recommendations:  TBD, FWW  Strengths:  Manages pain appropriately, Motivated for self care and independence and Supportive family  Barriers:   Decreased endurance, Impulsive, Limited mobility, Poor activity tolerance, Poor balance, Poor carryover of learning, Poor insight/denial of deficits, Uncooperative and Lack of motivation, impaired IMAN, impaired visual scanning, impaired attn, impulsivity, poor safety awareness, refused to perform stairs  # of short term goals set= 4  # of short term goals met=2       Physical Therapy Problems           Problem: Balance     Dates: Start: 09/15/18       Goal: STG-Within one week, patient will maintain static standing     Dates: Start: 09/15/18       Description: 1) Individualized goal:  With UE support 5 minutes x 2 with SPV to maintain balance  2) Interventions:  PT Group Therapy, PT Gait Training, PT Therapeutic Exercises, PT Neuro Re-Ed/Balance, PT Aquatic Therapy, PT Therapeutic Activity, PT Manual Therapy and PT Evaluation       Note:     Goal Note filed on 09/19/18 0430  "by Lupe Sandhu, PT    Goal: STG-Within one week, patient will maintain static standing  Outcome: NOT MET  Pt limited by attn to task                  Problem: Mobility     Dates: Start: 09/15/18       Goal: STG-Within one week, patient will ambulate up/down a curb     Dates: Start: 09/15/18       Description: 1) Individualized goal:  Min A for 6\" rise with UE support at // bars  2) Interventions:  PT Group Therapy, PT Gait Training, PT Therapeutic Exercises, PT Neuro Re-Ed/Balance, PT Aquatic Therapy, PT Therapeutic Activity, PT Manual Therapy and PT Evaluation     Note:     Goal Note filed on 09/19/18 1118 by Lupe Sandhu, PT    Goal: STG-Within one week, patient will ambulate up/down a curb  Outcome: NOT MET  Pt limited by attn to task, safety concerns at this time                  Problem: Mobility Transfers     Dates: Start: 09/15/18       Goal: STG-Within one week, patient will transfer bed to chair     Dates: Start: 09/15/18       Description: 1) Individualized goal:  Close SBA/ set-up with UE support and cues for sequencing/ safety  2) Interventions:  PT Group Therapy, PT Gait Training, PT Therapeutic Exercises, PT Neuro Re-Ed/Balance, PT Aquatic Therapy, PT Therapeutic Activity, PT Manual Therapy and PT Evaluation             Problem: PT-Long Term Goals     Dates: Start: 09/15/18       Goal: LTG-By discharge, patient will tolerate standing     Dates: Start: 09/15/18       Description: 1) Individualized goal:  X 10 minutes with UE support as needed with ability to maintain balance independent for basic mobility/ exercise program instruction  2) Interventions:  PT Group Therapy, PT Gait Training, PT Therapeutic Exercises, PT Neuro Re-Ed/Balance, PT Aquatic Therapy, PT Therapeutic Activity, PT Manual Therapy and PT Evaluation             Goal: LTG-By discharge, patient will propel wheelchair     Dates: Start: 09/15/18       Description: 1) Individualized goal:  SPV on unit to/ from meals and therapies " 150 ft x 2  2) Interventions:  PT Group Therapy, PT Gait Training, PT Therapeutic Exercises, PT Neuro Re-Ed/Balance, PT Aquatic Therapy, PT Therapeutic Activity, PT Manual Therapy and PT Evaluation             Goal: LTG-By discharge, patient will ambulate     Dates: Start: 09/15/18       Description: 1) Individualized goal:  SPV with  ft x 2 for level indoors  2) Interventions:  PT Group Therapy, PT Gait Training, PT Therapeutic Exercises, PT Neuro Re-Ed/Balance, PT Aquatic Therapy, PT Therapeutic Activity, PT Manual Therapy and PT Evaluation             Goal: LTG-By discharge, patient will transfer one surface to another     Dates: Start: 09/15/18       Description: 1) Individualized goal:  SPV/ modified independent with UE support  2) Interventions:  PT Group Therapy, PT Gait Training, PT Therapeutic Exercises, PT Neuro Re-Ed/Balance, PT Aquatic Therapy, PT Therapeutic Activity, PT Manual Therapy and PT Evaluation             Goal: LTG-By discharge, patient will ambulate up/down flight of stairs     Dates: Start: 09/15/18       Description: 1) Individualized goal:  SPV/ CGA with hand rails  2) Interventions:  PT Group Therapy, PT Gait Training, PT Therapeutic Exercises, PT Neuro Re-Ed/Balance, PT Aquatic Therapy, PT Therapeutic Activity, PT Manual Therapy and PT Evaluation           Goal: LTG-By discharge, patient will transfer in/out of a car     Dates: Start: 09/15/18       Description: 1) Individualized goal:  SPV  2) Interventions:  PT Group Therapy, PT Gait Training, PT Therapeutic Exercises, PT Neuro Re-Ed/Balance, PT Aquatic Therapy, PT Therapeutic Activity, PT Manual Therapy and PT Evaluation                     Section completed by:  Lupe Sandhu, PT       Activities of Daily Living  Eating Initial:  5 - Standby Prompting/Supervision or Set-up  Eating Current:  5 - Standby Prompting/Supervision or Set-up   Eating Description:  Supervision for safety, Verbal cueing  Grooming Initial:  5 - Standby  Prompting/Supervision or Set-up  Grooming Current:  5 - Standby Prompting/Supervision or Set-up   Grooming Description:  Set-up of equipment, Supervision for safety  Bathing Initial:  3 - Moderate Assistance  Bathing Current:  5 - Standby Prompting/Supervision or Set-up   Bathing Description:  Tub bench, Hand held shower, Supervision for safety, Verbal cueing, Set-up of equipment, Initial preparation for task (setup and SBA/supervised with cues for safety)  Upper Body Dressing Initial:  2 - Max Assistance  Upper Body Dressing Current:  5 - Standby Prompting/Supervision or Set-up   Upper Body Dressing Description:  Set-up of equipment, Supervision for safety, Increased time (setup and supervised with increased time)  Lower Body Dressing Initial:  1 - Total Assistance  Lower Body Dressing Current:  5 - Standby Prompting/Supervsion or Set-up   Lower Body Dressing Description:  5 - Standby Prompting/Supervsion or Set-up  Toileting Initial:  2 - Max Assistance  Toileting Current:  5 - Standby Prompting/Supervision or Set-up   Toileting Description:  Supervision for safety (SBA to stand and void in toilet)  Toilet Transfer Initial:  3 - Moderate Assistance  Toilet Transfer Current:  5 - Standby Prompting/Supervision or Set-up   Toilet Transfer Description:  5 - Standby Prompting/Supervision or Set-up  Tub / Shower Transfer Initial:  2 - Max Assistance  Tub / Shower Transfer Current:  5 - Standby Prompting/Supervision or Set-up   Tub / Shower Transfer Description:  Verbal cueing, Set-up of equipment, Initial preparation for task, Supervision for safety, Increased time, Grab bar, Adaptive equipment  IADL:  Not yet addressed   Family Training/Education:  Son and brother present during some sessions and do well with encouraging patient to do more for self; no hands on training  DME/DC Recommendations:  Recommend 24 hour supervision upon d/c    Strengths:  Adequate strength, Alert and oriented, Independent PLOF, Making steady  progress towards goals, Manages pain appropriately, Motivated for self care and independence, Pleasant and cooperative, Supportive family and Willingly participates in therapeutic activities  Barriers:  Decreased endurance, Generalized weakness, Impulsive, Limited mobility, Poor insight/denial of deficits and Other: left side weakness, impaired standing balance, father is ill back home, patient perseverates on discharging home to see his dying father     # of short term goals set= 5    # of short term goals met= 4         Occupational Therapy Goals           Problem: OT Long Term Goals     Dates: Start: 09/15/18       Goal: LTG-By discharge, patient will complete basic self care tasks     Dates: Start: 09/15/18       Description: 1) Individualized Goal:  At a supv to mod I level using DME and AE as needed.  2) Interventions:   OT E Stim Attended, OT Group Therapy, OT Self Care/ADL, OT Cognitive Skill Dev, OT Community Reintegration, OT Manual Ther Technique, OT Neuro Re-Ed/Balance, OT Sensory Int Techniques, OT Therapeutic Activity, OT Aquatic Therapy, OT Evaluation and OT Therapeutic Exercise.             Goal: LTG-By discharge, patient will perform bathroom transfers     Dates: Start: 09/15/18       Description: 1) Individualized Goal:  At a supv to mod I level using DME and AE as needed.  2) Interventions:   OT E Stim Attended, OT Group Therapy, OT Self Care/ADL, OT Cognitive Skill Dev, OT Community Reintegration, OT Manual Ther Technique, OT Neuro Re-Ed/Balance, OT Sensory Int Techniques, OT Therapeutic Activity, OT Aquatic Therapy, OT Evaluation and OT Therapeutic Exercise.             Goal: LTG-By discharge, patient will complete basic home management     Dates: Start: 09/15/18       Description: 1) Individualized Goal:  Necessary for DC at a min A to mod I level.  2) Interventions:   OT E Stim Attended, OT Group Therapy, OT Self Care/ADL, OT Cognitive Skill Dev, OT Community Reintegration, OT Manual Ther  Technique, OT Neuro Re-Ed/Balance, OT Sensory Int Techniques, OT Therapeutic Activity, OT Aquatic Therapy, OT Evaluation and OT Therapeutic Exercise.               Problem: Toileting     Dates: Start: 09/15/18       Goal: STG-Within one week, patient will complete toileting tasks     Dates: Start: 09/15/18       Description: 1) Individualized Goal:  With supv and set up using DME and AE as needed.  2) Interventions:   OT E Stim Attended, OT Group Therapy, OT Self Care/ADL, OT Cognitive Skill Dev, OT Community Reintegration, OT Manual Ther Technique, OT Neuro Re-Ed/Balance, OT Sensory Int Techniques, OT Therapeutic Activity, OT Aquatic Therapy, OT Evaluation and OT Therapeutic Exercise.       Note:     Goal Note filed on 09/18/18 1447 by Pablito Bunch MS,OTR/L    Goal: STG-Within one week, patient will complete toileting tasks  Outcome: NOT MET  Setup and SBA                      Section completed by:  Pablito Bunch MS,OTR/L       Cognitive Linquistic Functions  Comprehension Initial:  5 - Stand-by Prompting/Supervision or Set-up  Comprehension Current: 5 - Stand-by Prompting/Supervision or Set-up   Comprehension Description:  Verbal cues, Glasses  Expression Initial:  5 - Stand-by Prompting/Supervision or Set-up  Expression Current:  5 - Stand-by Prompting/Supervision or Set-up   Expression Description:  Verbal cueing  Social Interaction Initial:  2 - Max Assistance  Social Interaction Current:  3 - Moderate Assistance   Social Interaction Description:  Increased time, Verbal cues  Problem Solving Initial:  2 - Max Assistance  Problem Solving Current:  3 - Moderate Assistance   Problem Solving Description:  Verbal cueing, Therapy schedule, Bed/chair alarm, Increased time  Memory Initial:  3 - Moderate Assistance  Memory Current:  3 - Moderate Assistance   Memory Description:  Verbal cueing, Therapy schedule, Bed/chair alarm  Executive Functioning / Organization Initial:     Executive Functioning /  Organization Current: 2 - Max Assistance      Executive Functioning Desciption: Pt was independent prior. Attention deficits barrier to completing IADL related tasks at this time.   Swallowing  Swallowing Status: MBSS completed 9/18/18, flash penetration of residue following thin liquid wash and serial swallows thins. No aspiration noted. Advanced to regular textures/thin liquids. Pt remains in therapeutic dining for supervision and continued training in use of compensatory strategies to clear residue. Impulsive, limited carryover at this time.   Orders Placed This Encounter   Procedures   • Diet Order Regular     Standing Status:   Standing     Number of Occurrences:   1     Order Specific Question:   Diet:     Answer:   Regular [1]     Order Specific Question:   Consistency/Fluid modifications:     Answer:   Thin Liquids [3]     Behavior Modification Plan  Keep instructions simple/concrete, Give clear feedback, Set clear goals, Redirect to task/topic, Provide reasonable choices, Decrease the chance of failure by offering activities that are within the patient's abilities, Analyze tasks (break down into smaller steps) and Reinforce participation in desired tasks  Assistive Technology  Low/Impaired vision equipment and Low tech: Calendar, planner, schedule, alarms/timers, pill organizer, post-it notes, lists  Family Training/Education:  Initiated with pt's son.   DC Recommendations: Pt will require continued SLP services upon d/c, 24 hour supervision for safety at this time.   Strengths:  Independent PLOF, Making steady progress towards goals and Supportive family  Barriers:  Agitation, Confused, Generalized weakness, Impulsive, Poor activity tolerance, Poor insight/denial of deficits and Uncooperative  # of short term goals set=5  # of short term goals met=2       Speech Therapy Problems           Problem: Memory STGs     Dates: Start: 09/15/18       Goal: STG-Within one week, patient will     Dates: Start:  09/15/18       Description: 1) Individualized goal:  score 20/30 on the orientation log across 3/3 sessions with MIN A.   2) Interventions:  SLP Speech Language Treatment and SLP Cognitive Skill Development       Note:     Goal Note filed on 09/19/18 1020 by Chantelle Leiws MS,CCC-SLP    Goal: STG-Within one week, patient will  Outcome: NOT MET  Pt received 22/30 for 1 session. Will continue to target to address   carryover.                   Problem: Problem Solving STGs     Dates: Start: 09/15/18       Goal: STG-Within one week, patient will     Dates: Start: 09/15/18       Description: 1) Individualized goal:  complete sustained attention tasks with 75% accuracy and MOD A.   2) Interventions:  SLP Speech Language Treatment and SLP Cognitive Skill Development       Note:     Goal Note filed on 09/19/18 1020 by Chantelle Lewis MS,CCC-SLP    Goal: STG-Within one week, patient will  Outcome: NOT MET  MAX cues for simple attention                  Problem: Social Interaction STGs     Dates: Start: 09/15/18       Goal: STG-Within one week, patient will     Dates: Start: 09/15/18       Description: 1) Individualized goal:  Verbally recognize social cues following inappropriate comments/actions/questions in 5/5 opportunities during a 60-minute session with MOD A.   2) Interventions:  SLP Speech Language Treatment and SLP Cognitive Skill Development       Note:     Goal Note filed on 09/19/18 1020 by Chantelle Lewis MS,CCC-SLP    Goal: STG-Within one week, patient will  Outcome: NOT MET  Pt's performance fluctuates and is not consistent in self monitoring   social communication at this time.                   Problem: Speech/Swallowing LTGs     Dates: Start: 09/15/18       Goal: LTG-By discharge, patient will safely swallow     Dates: Start: 09/15/18       Description: 1) Individualized goal:  Regular texture diet without overt clinical s/sx of aspiration with MOD I.   2) Interventions:  SLP Electrical Stimulation -  Attended, SLP Swallowing Dysfunction Treatment, SLP Oral Pharyngeal Evaluation, SLP Video Swallow Evaluation, SLP Self Care / ADL Training , SLP Sensory Integration Techniques , SLP Cognitive Skill Development and SLP Group Treatment             Goal: LTG-By discharge, patient will solve basic problems     Dates: Start: 09/15/18       Description: 1) Individualized goal:  R/t safety and ADLs/IADLs with MOD I.   2) Interventions:  SLP Speech Language Treatment, SLP Self Care / ADL Training , SLP Sensory Integration Techniques , SLP Cognitive Skill Development and SLP Group Treatment                    Section completed by:  Chantelle Lewis MS,[facility]-SLP          REHAB-Pharmacy IDT Team Note by Jennifer Hines RPH at 9/18/2018 11:09 AM  Version 1 of 1    Author:  Jennifer Hines RPH Service:  (none) Author Type:  Pharmacist    Filed:  9/18/2018 11:10 AM Date of Service:  9/18/2018 11:09 AM Status:  Signed    :  Jennifer Hines RPH (Pharmacist)         Pharmacy   Pharmacy  Antibiotics/Antifungals/Antivirals:  Medication:      Active Orders     None        Route:         n/a  Stop Date:  n/a  Reason:   Antihypertensives/Cardiac:  Medication:    Orders (72h ago through future)    Start     Ordered    09/15/18 2100  atorvastatin (LIPITOR) tablet 80 mg  EVERY EVENING      09/15/18 1116    09/14/18 2100  atorvastatin (LIPITOR) tablet 80 mg  EVERY EVENING,   Status:  Discontinued      09/14/18 1449    09/14/18 1730  carvedilol (COREG) tablet 3.125 mg  2 TIMES DAILY WITH MEALS      09/14/18 1449    09/14/18 1015  hydrALAZINE (APRESOLINE) tablet 25 mg  EVERY 8 HOURS PRN      09/14/18 1018        Patient Vitals for the past 24 hrs:   BP Pulse   09/18/18 0900 123/80 80   09/18/18 0630 - 96   09/17/18 1840 101/74 72   09/17/18 1600 122/78 90       Anticoagulation:  Medication:  Aspirin,   INR:      Other key medications: Methocarbamol, Quetiapine    A review of the medication list reveals no issues at this time. Patient  is currently on antihypertensive(s). Recommend home blood pressure monitoring/recording if antihypertensive(s) regimen(s) continue.    Section completed by:  Jennifer Hines RPH[TK.1]        Attribution Key     TK.1 - Jennifer Hines RPH on 9/18/2018 11:09 AM                      DC Planning    DC destination/dispostion:  Patient lives alone in the [place], [place]. Plan is for him to go home with his brother in [place], [place]. Home is one story.     Referrals: Assist to find new PCP.     DC Needs: Patient has no DME. Will need therapy which may be delayed due to establishing with a new PCP. Patient had loop recorder placed and will need cardiology follow up. I anticipate patient will need 24 hour supervision, and family confirms they can provide this.     Barriers to discharge:  No local physician.    Strengths: Good family support and able to provide care.     Section completed by:  Chikis Bradley R.N.      Physician Summary  Vincent Jean MD participated and led team conference discussion.

## 2018-09-19 NOTE — CARE PLAN
Problem: Swallowing STGs  Goal: STG-Within one week, patient will safely swallow  1) Individualized goal:  Dysphagia 1 texture and nectar thick liquids without overt clinical s/sx of aspiration and MIN cues for strategy use.   2) Interventions:  SLP Electrical Stimulation - Attended, SLP Swallowing Dysfunction Treatment, SLP Oral Pharyngeal Evaluation, SLP Video Swallow Evaluation, SLP Self Care / ADL Training , SLP Sensory Integration Techniques , SLP Cognitive Skill Development and SLP Group Treatment     Outcome: MET Date Met: 09/19/18  Pt advanced to regular textures/thin liquids following MBSS.   Goal: STG-Within one week, patient will  1) Individualized goal:  Complete MBSS with appropriate goals to follow.   2) Interventions:  SLP Video Swallow Evaluation     Outcome: MET Date Met: 09/19/18  Pt with flash penetration on residue following thin liquids. Advanced to regular textures/thin liquids.     Problem: Social Interaction STGs  Goal: STG-Within one week, patient will  1) Individualized goal:  Verbally recognize social cues following inappropriate comments/actions/questions in 5/5 opportunities during a 60-minute session with MOD A.   2) Interventions:  SLP Speech Language Treatment and SLP Cognitive Skill Development     Outcome: NOT MET  Pt's performance fluctuates and is not consistent in self monitoring social communication at this time.     Problem: Problem Solving STGs  Goal: STG-Within one week, patient will  1) Individualized goal:  complete sustained attention tasks with 75% accuracy and MOD A.   2) Interventions:  SLP Speech Language Treatment and SLP Cognitive Skill Development     Outcome: NOT MET  MAX cues for simple attention    Problem: Memory STGs  Goal: STG-Within one week, patient will  1) Individualized goal:  score 20/30 on the orientation log across 3/3 sessions with MIN A.   2) Interventions:  SLP Speech Language Treatment and SLP Cognitive Skill Development     Outcome: NOT MET  Pt  received 22/30 for 1 session. Will continue to target to address carryover.

## 2018-09-19 NOTE — ED TRIAGE NOTES
PT BIB EMS per report pt had a stroke 2 weeks ago and was recovering at [facility].  Per report a MD went to assess the pt today and noted the pt to have increased weakness on the lt side.  PT was taken to CT, neuro arrived in [place]

## 2018-09-19 NOTE — REHAB-SLP IDT TEAM NOTE
Speech Therapy   Cognitive Linquistic Functions  Comprehension Initial:  5 - Stand-by Prompting/Supervision or Set-up  Comprehension Current: 5 - Stand-by Prompting/Supervision or Set-up   Comprehension Description:  Verbal cues, Glasses  Expression Initial:  5 - Stand-by Prompting/Supervision or Set-up  Expression Current:  5 - Stand-by Prompting/Supervision or Set-up   Expression Description:  Verbal cueing  Social Interaction Initial:  2 - Max Assistance  Social Interaction Current:  3 - Moderate Assistance   Social Interaction Description:  Increased time, Verbal cues  Problem Solving Initial:  2 - Max Assistance  Problem Solving Current:  3 - Moderate Assistance   Problem Solving Description:  Verbal cueing, Therapy schedule, Bed/chair alarm, Increased time  Memory Initial:  3 - Moderate Assistance  Memory Current:  3 - Moderate Assistance   Memory Description:  Verbal cueing, Therapy schedule, Bed/chair alarm  Executive Functioning / Organization Initial:     Executive Functioning / Organization Current: 2 - Max Assistance      Executive Functioning Desciption: Pt was independent prior. Attention deficits barrier to completing IADL related tasks at this time.   Swallowing  Swallowing Status: MBSS completed 9/18/18, flash penetration of residue following thin liquid wash and serial swallows thins. No aspiration noted. Advanced to regular textures/thin liquids. Pt remains in therapeutic dining for supervision and continued training in use of compensatory strategies to clear residue. Impulsive, limited carryover at this time.   Orders Placed This Encounter   Procedures   • Diet Order Regular     Standing Status:   Standing     Number of Occurrences:   1     Order Specific Question:   Diet:     Answer:   Regular [1]     Order Specific Question:   Consistency/Fluid modifications:     Answer:   Thin Liquids [3]     Behavior Modification Plan  Keep instructions simple/concrete, Give clear feedback, Set clear goals,  Redirect to task/topic, Provide reasonable choices, Decrease the chance of failure by offering activities that are within the patient's abilities, Analyze tasks (break down into smaller steps) and Reinforce participation in desired tasks  Assistive Technology  Low/Impaired vision equipment and Low tech: Calendar, planner, schedule, alarms/timers, pill organizer, post-it notes, lists  Family Training/Education:  Initiated with pt's son.   DC Recommendations: Pt will require continued SLP services upon d/c, 24 hour supervision for safety at this time.   Strengths:  Independent PLOF, Making steady progress towards goals and Supportive family  Barriers:  Agitation, Confused, Generalized weakness, Impulsive, Poor activity tolerance, Poor insight/denial of deficits and Uncooperative  # of short term goals set=5  # of short term goals met=2       Speech Therapy Problems           Problem: Memory STGs     Dates: Start: 09/15/18       Goal: STG-Within one week, patient will     Dates: Start: 09/15/18       Description: 1) Individualized goal:  score 20/30 on the orientation log across 3/3 sessions with MIN A.   2) Interventions:  SLP Speech Language Treatment and SLP Cognitive Skill Development       Note:     Goal Note filed on 09/19/18 1020 by Chantelle Lewis MS,CCC-SLP    Goal: STG-Within one week, patient will  Outcome: NOT MET  Pt received 22/30 for 1 session. Will continue to target to address   carryover.                   Problem: Problem Solving STGs     Dates: Start: 09/15/18       Goal: STG-Within one week, patient will     Dates: Start: 09/15/18       Description: 1) Individualized goal:  complete sustained attention tasks with 75% accuracy and MOD A.   2) Interventions:  SLP Speech Language Treatment and SLP Cognitive Skill Development       Note:     Goal Note filed on 09/19/18 1020 by Chantelle Lewis MS,CCC-SLP    Goal: STG-Within one week, patient will  Outcome: NOT MET  MAX cues for simple attention                   Problem: Social Interaction STGs     Dates: Start: 09/15/18       Goal: STG-Within one week, patient will     Dates: Start: 09/15/18       Description: 1) Individualized goal:  Verbally recognize social cues following inappropriate comments/actions/questions in 5/5 opportunities during a 60-minute session with MOD A.   2) Interventions:  SLP Speech Language Treatment and SLP Cognitive Skill Development       Note:     Goal Note filed on 09/19/18 1020 by Chantelle Lewis MS,CCC-SLP    Goal: STG-Within one week, patient will  Outcome: NOT MET  Pt's performance fluctuates and is not consistent in self monitoring   social communication at this time.                   Problem: Speech/Swallowing LTGs     Dates: Start: 09/15/18       Goal: LTG-By discharge, patient will safely swallow     Dates: Start: 09/15/18       Description: 1) Individualized goal:  Regular texture diet without overt clinical s/sx of aspiration with MOD I.   2) Interventions:  SLP Electrical Stimulation - Attended, SLP Swallowing Dysfunction Treatment, SLP Oral Pharyngeal Evaluation, SLP Video Swallow Evaluation, SLP Self Care / ADL Training , SLP Sensory Integration Techniques , SLP Cognitive Skill Development and SLP Group Treatment             Goal: LTG-By discharge, patient will solve basic problems     Dates: Start: 09/15/18       Description: 1) Individualized goal:  R/t safety and ADLs/IADLs with MOD I.   2) Interventions:  SLP Speech Language Treatment, SLP Self Care / ADL Training , SLP Sensory Integration Techniques , SLP Cognitive Skill Development and SLP Group Treatment                    Section completed by:  Chantelle Lewis MS,CCC-SLP

## 2018-09-19 NOTE — PROGRESS NOTES
Pt continuously keeps getting out of bed without assist.  Pt is unsteady on his feet and cursing at staff.  Pt has kicked the wheelchair a few times tonight and threatening staff.  Notified on call Dr. Kaminski regarding patient.  New orders given for Trazodone.  See MAR.
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
## 2018-09-19 NOTE — PROGRESS NOTES
0715: Bedside report received, assumed care for this patient.  Patient is A&O x 4.  Refused vitals so far.  Communication board updated, call light and belongings are within reach.  Bed is in low position. Patient appears in no distress, and has no complaints of SOB and 9/10 of pain.  Will be medicated per MAR.  Plan of care discussed and agreed upon with patient.  Says will take vitals after eating.  Patient given medications in T-dine, relatively calm, but tense demeanor.    Patient immediately went to sleep after breakfast.  Will wait for patient to wake before getting vitals as patient gets agitated/aggressive when woken.  Respirations calm/even

## 2018-09-19 NOTE — REHAB-PT IDT TEAM NOTE
Physical Therapy   Mobility  Bed mobility:   5  Bed /Chair/Wheelchair Transfer Initial:  4 - Minimal Assistance  Bed /Chair/Wheelchair Transfer Current:  5 - Standby Prompting/Supervision or Set-up   Bed/Chair/Wheelchair Transfer Description:  Increased time, Supervision for safety, Verbal cueing, Set-up of equipment (SPT SBA, bed mobility SPV)  Walk Initial:  1 - Total Assistance  Walk Current:  4 - Minimal Assistance   Walk Description:  Extra time, Supervision for safety, Safety concerns, Walker, Verbal cueing (150' x 1 and 225' x 1 FWW CGA/Anastacia for walker management, vc for inc L step length. poor safety awareness and walker management)  Wheelchair Initial:  1 - Total Assistance  Wheelchair Current:  2 - Max Assistance   Wheelchair Description:   (120ft x 1, B UE, cues for attn to task)  Stairs Initial:  0 - Not tested,unsafe activity  Stairs Current: 0 - Not tested,unsafe activity   Stairs Description:    Patient/Family Training/Education:  CLOF, gait mechanics  DME/DC Recommendations:  TBD, FWW  Strengths:  Manages pain appropriately, Motivated for self care and independence and Supportive family  Barriers:   Decreased endurance, Impulsive, Limited mobility, Poor activity tolerance, Poor balance, Poor carryover of learning, Poor insight/denial of deficits, Uncooperative and Lack of motivation, impaired IMAN, impaired visual scanning, impaired attn, impulsivity, poor safety awareness, refused to perform stairs  # of short term goals set= 4  # of short term goals met=2       Physical Therapy Problems           Problem: Balance     Dates: Start: 09/15/18       Goal: STG-Within one week, patient will maintain static standing     Dates: Start: 09/15/18       Description: 1) Individualized goal:  With UE support 5 minutes x 2 with SPV to maintain balance  2) Interventions:  PT Group Therapy, PT Gait Training, PT Therapeutic Exercises, PT Neuro Re-Ed/Balance, PT Aquatic Therapy, PT Therapeutic Activity, PT Manual  "Therapy and PT Evaluation       Note:     Goal Note filed on 09/19/18 1118 by Lupe Sandhu, PT    Goal: STG-Within one week, patient will maintain static standing  Outcome: NOT MET  Pt limited by attn to task                  Problem: Mobility     Dates: Start: 09/15/18       Goal: STG-Within one week, patient will ambulate up/down a curb     Dates: Start: 09/15/18       Description: 1) Individualized goal:  Min A for 6\" rise with UE support at // bars  2) Interventions:  PT Group Therapy, PT Gait Training, PT Therapeutic Exercises, PT Neuro Re-Ed/Balance, PT Aquatic Therapy, PT Therapeutic Activity, PT Manual Therapy and PT Evaluation     Note:     Goal Note filed on 09/19/18 1118 by Lupe Sandhu, PT    Goal: STG-Within one week, patient will ambulate up/down a curb  Outcome: NOT MET  Pt limited by attn to task, safety concerns at this time                  Problem: Mobility Transfers     Dates: Start: 09/15/18       Goal: STG-Within one week, patient will transfer bed to chair     Dates: Start: 09/15/18       Description: 1) Individualized goal:  Close SBA/ set-up with UE support and cues for sequencing/ safety  2) Interventions:  PT Group Therapy, PT Gait Training, PT Therapeutic Exercises, PT Neuro Re-Ed/Balance, PT Aquatic Therapy, PT Therapeutic Activity, PT Manual Therapy and PT Evaluation             Problem: PT-Long Term Goals     Dates: Start: 09/15/18       Goal: LTG-By discharge, patient will tolerate standing     Dates: Start: 09/15/18       Description: 1) Individualized goal:  X 10 minutes with UE support as needed with ability to maintain balance independent for basic mobility/ exercise program instruction  2) Interventions:  PT Group Therapy, PT Gait Training, PT Therapeutic Exercises, PT Neuro Re-Ed/Balance, PT Aquatic Therapy, PT Therapeutic Activity, PT Manual Therapy and PT Evaluation             Goal: LTG-By discharge, patient will propel wheelchair     Dates: Start: 09/15/18       " Description: 1) Individualized goal:  SPV on unit to/ from meals and therapies 150 ft x 2  2) Interventions:  PT Group Therapy, PT Gait Training, PT Therapeutic Exercises, PT Neuro Re-Ed/Balance, PT Aquatic Therapy, PT Therapeutic Activity, PT Manual Therapy and PT Evaluation             Goal: LTG-By discharge, patient will ambulate     Dates: Start: 09/15/18       Description: 1) Individualized goal:  SPV with  ft x 2 for level indoors  2) Interventions:  PT Group Therapy, PT Gait Training, PT Therapeutic Exercises, PT Neuro Re-Ed/Balance, PT Aquatic Therapy, PT Therapeutic Activity, PT Manual Therapy and PT Evaluation             Goal: LTG-By discharge, patient will transfer one surface to another     Dates: Start: 09/15/18       Description: 1) Individualized goal:  SPV/ modified independent with UE support  2) Interventions:  PT Group Therapy, PT Gait Training, PT Therapeutic Exercises, PT Neuro Re-Ed/Balance, PT Aquatic Therapy, PT Therapeutic Activity, PT Manual Therapy and PT Evaluation             Goal: LTG-By discharge, patient will ambulate up/down flight of stairs     Dates: Start: 09/15/18       Description: 1) Individualized goal:  SPV/ CGA with hand rails  2) Interventions:  PT Group Therapy, PT Gait Training, PT Therapeutic Exercises, PT Neuro Re-Ed/Balance, PT Aquatic Therapy, PT Therapeutic Activity, PT Manual Therapy and PT Evaluation           Goal: LTG-By discharge, patient will transfer in/out of a car     Dates: Start: 09/15/18       Description: 1) Individualized goal:  SPV  2) Interventions:  PT Group Therapy, PT Gait Training, PT Therapeutic Exercises, PT Neuro Re-Ed/Balance, PT Aquatic Therapy, PT Therapeutic Activity, PT Manual Therapy and PT Evaluation                     Section completed by:  Lupe Sandhu, PT

## 2018-09-19 NOTE — REHAB-NURSING IDT TEAM NOTE
Nursing   Nursing  Diet/Nutrition:  Regular and Thin Liquids  Medication Administration:  Whole with Liquid Wash  % consumed at meals in last 24 hours:  Consumed % of meals per documentation.  Meal/Snack Consumption for the past 24 hrs:   Oral Nutrition   09/18/18 1315 Lunch;Between % Consumed       Snack schedule:  None Ordered  Supplement:  Other: N/A  Appetite:  Good  Fluid Intake/Output in past 24 hours: In: 620 [P.O.:620]  Out: 850 Pt also up to the toilet to void.  Admit Weight:  Weight: 112 kg (246 lb 14.6 oz)  Weight Last 7 Days   [111.2 kg (245 lb 2.4 oz)-112 kg (246 lb 14.6 oz)] 111.2 kg (245 lb 2.4 oz) (09/16 0500)    Pain Issues:    Location:  Back;Buttock;Head (09/18 2102)  Left;Posterior;Right (09/18 2102)         Severity:  Moderate   Scheduled pain medications:  None     PRN pain medications used in last 24 hours:  acetaminophen (TYLENOL)  Robaxin  Non Pharmacologic Interventions:  heating pad to back  Effectiveness of pain management plan:  good=patient states acceptable comfort after interventions    Bowel:    Bowel Assist Initial Score:  3 - Moderate Assistance  Bowel Assist Current Score:  6 - Modified Independent  Bowl Accidents in last 7 days:     Last bowel movement: 09/17/18  Stool Description: Large, Formed, Hard     Usual bowel pattern:  daily  Scheduled bowel medications:  docusate sodium (COLACE) and senna-docusate (PERICOLACE or SENOKOT S)   PRN bowel medications used in last 24 hours:  None  Nursing Interventions:  Increased time, Positioning on commode/toilet, Supervision, Verbal cueing, Set-up  Effectiveness of bowel program:   good=regular, predictable, controlled emptying of bowel  Bladder:    Bladder Assist Initial Score:  3 - Moderate Assistance  Bladder Assist Current Score:  1 - Total Assistance  Bladder Accidents in last 7 days:  2  PVR range for past 24-48 hours: -- ()  Intermittent Catheterization:  N/A  Medications affecting bladder:  None    Time void  schedule/voiding pattern:  Voiding every 3-4  hours  Interventions:  Increased time, Supervision, Verbal cueing, Emptying of device, Urinal, Time void patient initiated  Effectiveness of bladder training:  Good=regular, predictable, emptying of bladder, patient initiates time voiding    Sleep/wake cycle:   Average hours slept:  Restless throughout night  Pt has been intermittently sleeping during the night.  Sleep medication usage:  Other Scheduled Melatonin every bedtime.  Called on call Dr.Keating saleh 9/18/18 for sleep medication.  Order received for Trazodone x 1 with repeat x 1.  Pt restless tonight, as with previous nights, multiple attempts to get out of bed-pt is with unsteady gait.  Verbally abusive and threatening staff, kicking wheelchair multiple times tonight.    Patient/Family Training/Education:  Fall Prevention, General Self Care, Medication Management, Safe Transfers and Safety  Discharge Supply Recommendations:  Other: Adaptive Equipment  Strengths: Supportive family   Barriers:   Unable to follow instructions, Bladder incontinence, Confused, Generalized weakness, Impulsive, Limited mobility, Poor sleep pattern and Uncooperative            Nursing Problems           Problem: Bowel/Gastric:     Goal: Normal bowel function is maintained or improved           Goal: Will not experience complications related to bowel motility             Problem: Communication     Goal: The ability to communicate needs accurately and effectively will improve             Problem: Discharge Barriers/Planning     Goal: Patient's continuum of care needs will be met             Problem: Infection     Goal: Will remain free from infection             Problem: Knowledge Deficit     Goal: Knowledge of disease process/condition, treatment plan, diagnostic tests, and medications will improve           Goal: Knowledge of the prescribed therapeutic regimen will improve             Problem: Mobility     Goal: Risk for activity  intolerance will decrease             Problem: Pain Management     Goal: Pain level will decrease to patient's comfort goal             Problem: Psychosocial Needs:     Goal: Level of anxiety will decrease             Problem: Respiratory:     Goal: Respiratory status will improve             Problem: Safety     Goal: Will remain free from injury           Goal: Will remain free from falls             Problem: Skin Integrity     Goal: Risk for impaired skin integrity will decrease             Problem: Venous Thromboembolism (VTW)/Deep Vein Thrombosis (DVT) Prevention:     Goal: Patient will participate in Venous Thrombosis (VTE)/Deep Vein Thrombosis (DVT)Prevention Measures                  Long Term Goals:   At discharge patient will be able to function safely at home and in the community with support.    Section completed by:  Candice North L.P.N.2

## 2018-09-19 NOTE — DISCHARGE PLANNING
Case Management;  Met briefly with patient.  He is anxious for discharge and we will have conference this afternoon.  I spoke with  today and requested assistance with finding PCP on plan with insurance and outpatient rehabs in patient's home area.  Patient indicates that his son and brother are also working on this.  Will follow.

## 2018-09-19 NOTE — PROGRESS NOTES
Intermittently slept during the night despite having Trazodone.  Bed alarm remains in place.  Q 1 hour checks continues.  TV monitored is continued.

## 2018-09-19 NOTE — PROGRESS NOTES
"Rehab Progress Note     Encounter date: 9/19/2018  Today I met with the patient face to face in his room    Chief Complaint:  Stroke (HCC) , discharge    Interval Events (subjective)  Mr. Klein continues to perseverate on discharging home as soon as possible.  He denies any fevers, chills, headache, dizziness, chest pain, or shortness of breath.  He denies any ongoing impairments.  He reports that he is now fully back to his baseline.  Nursing reports that he continued to have agitated/aggressive episodes overnight.  The on-call physician was called due to inability to sleep and additional prescription for trazodone were added last night.  There was not significant benefit noted from the seroquel/depakote yesterday night    Objective:  VITAL SIGNS: /74   Pulse 75   Temp 36.6 °C (97.8 °F)   Resp 18   Ht 1.803 m (5' 11\")   Wt 111.2 kg (245 lb 2.4 oz)   SpO2 96%   BMI 34.19 kg/m²     No results found for this or any previous visit (from the past 72 hour(s)).    Current Facility-Administered Medications   Medication Frequency   • Divalproex Sodium (DEPAKOTE) capsule 250 mg Q8HRS   • traZODone (DESYREL) tablet 100 mg QHS   • QUEtiapine (SEROQUEL) tablet 25 mg Q24HR    And   • QUEtiapine (SEROQUEL) tablet 25 mg Daily-0800    And   • QUEtiapine (SEROQUEL) tablet 100 mg Q EVENING   • melatonin tablet 3 mg QHS   • vitamin D (cholecalciferol) tablet 1,000 Units DAILY   • aspirin (ASA) chewable tab 81 mg DAILY   • atorvastatin (LIPITOR) tablet 80 mg Q EVENING   • acetaminophen (TYLENOL) tablet 650 mg Q4HRS PRN   • Respiratory Care per Protocol Continuous RT   • Pharmacy Consult Request ...Pain Management Review 1 Each PRN   • docusate sodium (COLACE) capsule 100 mg DAILY    And   • senna-docusate (PERICOLACE or SENOKOT S) 8.6-50 MG per tablet 1 Tab Q EVENING    And   • lactulose 20 GM/30ML solution 30 mL Q24HRS PRN    And   • bisacodyl (DULCOLAX) suppository 10 mg QDAY PRN   • artificial tears 1.4 % ophthalmic " solution 1 Drop PRN   • hydrALAZINE (APRESOLINE) tablet 25 mg Q8HRS PRN   • mag hydrox-al hydrox-simeth (MAALOX PLUS ES or MYLANTA DS) suspension 20 mL Q2HRS PRN   • ondansetron (ZOFRAN ODT) dispertab 4 mg 4X/DAY PRN    Or   • ondansetron (ZOFRAN) syringe/vial injection 4 mg 4X/DAY PRN   • sodium chloride (OCEAN) 0.65 % nasal spray 2 Spray PRN   • carvedilol (COREG) tablet 3.125 mg BID WITH MEALS   • menthol-methyl salicylate (BENGAY) ointment TID PRN   • methocarbamol (ROBAXIN) tablet 500 mg 4X/DAY PRN       Exam Date: 9/19/2018    General: On my first visit, he was sleeping soundly, opened his eyes, but refused to participate in conversation.  During second encounter, he was awake, alert, participates with examination  Cardiac: regular rate and rhythm  Lungs: clear to auscultation bilaterally.   Abdomen: soft; non tender, non distended, bowel sounds present and normoactive  Extremities: No edema in the bilateral lower limbs  Neuro:   Extremely tangential, perseverates on going home, mild aggression at times, redirection is effective in controlling symptoms.  Mild dysarthria       Orders Placed This Encounter   Procedures   • Diet Order Regular     Standing Status:   Standing     Number of Occurrences:   1     Order Specific Question:   Diet:     Answer:   Regular [1]     Order Specific Question:   Consistency/Fluid modifications:     Answer:   Thin Liquids [3]       Assessment:  Active Hospital Problems    Diagnosis   • Stroke (HCC)   • Cerebral edema (HCC)   • Cognitive deficits   • Low vitamin D level   • Dysphagia as late effect of cerebrovascular accident (CVA)   • Cardiomyopathy (HCC)       Medical Decision Making and Plan:  Mr. 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  Left hemiparesis  Impulsivity  Cognitive deficits  Poor  insight  Continue comprehensive rehabilitation   On aspirin and statin for secondary prophylaxis     Continue scheduled Seroquel 25 mg morning and mid day and 100 mg qhs--increased 9/18  Depakote 250 mg q8 hours for mood/behavior stabilization--increased frequency on 9/19  Trazodone 100 mg qhs due to nocturnal agitation/poor sleep    Reinforced day/night interval   Melatonin 3 mg at bedtime     Patient's family has requested discharge as soon as possible due to the patient's father's terminal diagnosis  Patient's brother reports he is able to provide 24 hour supervision with combination of family and friends    Addendum: Called by nursing at 1325 regarding patient reports of worsening numbness on the left side.  Upon my reevaluation, the patient is having mild increase in dysarthria with slurring of speech, mild increase in left pronator drift, mild worsening of left-sided coordination, and clear change in left-sided facial sensation.  I requested a stat head CT to evaluate potential for further bleeding/stroke.  This could not be done in a timely manner, so he was sent to the emergency department for emergent imaging.       Systolic CHF  EF 45%  PFO  Outpatient follow up with cardiology for his loop recorder     Hypertension  Bradycardia  Coreg 3.125 mg twice a day  Blood pressure stable at 112/74 mmHg today  Pulse is 75 today  Continue to monitor while on beta-blocker     Hypertriglyceridemia   Continue statin     Back pain  Leg cramps  Therapy, bengay, hot pack  PRN robaxin  Avoid narcotics due to cognitive status     Vitamin D deficiency  Vitamin D was 18 on admission, so we began supplementation with cholecalciferol 1000 units daily      DVT prophylaxis:  Contraindicated given hemorrhagic conversion. Increase mobility. Monitor for edema.    Estimated discharge: September 20--to help facilitate the patient and family being able to cope with his father's end of life.  He would benefit from ongoing inpatient care,  but the family prefers transitioning sooner.   He will need a manual wheelchair  He will need outpatient SLP/OT/PT    IDT Team Conference 9/19/2018  I individually evaluated the patient today.  In addition to my daily follow up visit, I was present for and led the interdisciplinary team conference on 9/19/2018 and agree with the interdisciplinary conference documentation and plan of care.     RN: he had neurological changes today and is being re imaged.  He was still having issues with behavior.  He is having poor sleep.       PT:  He is having improved endurance.  He needs cuing with a walker.  He has very poor scanning and is impulsive.  Poor safety awareness.  He refuses to trial stairs or car transfers.       OT:  He is improving well.  Perseveration and safety awareness are barriers.  Left sided weakness is a barrier.  He has poor deficit awareness.      Speech and language pathology:  He did well with his swallow.  He is back to a regular diet.  He needs cuing for strategies.  He has significant attention deficits.  He will need cuing for safety with meals at discharge.    Total time:  45 minutes.  I spent greater than 50% of the time for patient care, counseling, and coordination on this date, including unit/floor time, and face-to-face time with the patient as per interval events and assessment and plan above. Topics discussed included discharge planning, functional progress, acute change in status, new left-sided symptoms.  Discussed with nursing    Vincent Jean M.D.  9/19/2018

## 2018-09-19 NOTE — CARE PLAN
"Problem: Balance  Goal: STG-Within one week, patient will maintain static standing  1) Individualized goal:  With UE support 5 minutes x 2 with SPV to maintain balance  2) Interventions:  PT Group Therapy, PT Gait Training, PT Therapeutic Exercises, PT Neuro Re-Ed/Balance, PT Aquatic Therapy, PT Therapeutic Activity, PT Manual Therapy and PT Evaluation     Outcome: NOT MET  Pt limited by attn to task    Problem: Mobility  Goal: STG-Within one week, patient will propel wheelchair household distances  1) Individualized goal:  SBA with cues for sequencing 50 ft x 2  2) Interventions:  PT Group Therapy, PT Gait Training, PT Therapeutic Exercises, PT Neuro Re-Ed/Balance, PT Aquatic Therapy, PT Therapeutic Activity, PT Manual Therapy and PT Evaluation     Outcome: MET Date Met: 09/19/18    Goal: STG-Within one week, patient will ambulate household distance  1) Individualized goal:  Min A with FWW 50 ft x 3 level/indoors  2) Interventions:  PT Group Therapy, PT Gait Training, PT Therapeutic Exercises, PT Neuro Re-Ed/Balance, PT Aquatic Therapy, PT Therapeutic Activity, PT Manual Therapy and PT Evaluation     Outcome: MET Date Met: 09/19/18    Goal: STG-Within one week, patient will ambulate up/down a curb  1) Individualized goal:  Min A for 6\" rise with UE support at // bars  2) Interventions:  PT Group Therapy, PT Gait Training, PT Therapeutic Exercises, PT Neuro Re-Ed/Balance, PT Aquatic Therapy, PT Therapeutic Activity, PT Manual Therapy and PT Evaluation   Outcome: NOT MET  Pt limited by attn to task, safety concerns at this time      "

## 2018-09-19 NOTE — ED PROVIDER NOTES
"ED Provider Note    Scribed for Marcela Zacarias M.D. by Tariq De La Garza. 9/19/2018  2:46 PM    Primary care provider: None noted  Means of arrival: Walk In  History obtained from: EMS  History limited by: None    CHIEF COMPLAINT  Chief Complaint   Patient presents with   • Possible Stroke       HPI  Anne-Marie Brooks is a 53 y.o. male who presents to the Emergency Department after being sent here from rehab where he was placed after suffering a stroke approximately 1-2 weeks ago. He was noticed to have a left sided facial droop at approximately 1:15 PM today and reported left arm and left leg numbness, and showed a left sided arm drift. Patient currently has a NIH score of 5    REVIEW OF SYSTEMS  Neuro: Stroke no shaking no falls or head trauma no vomiting or diarrhea chest pains or shortness of breath he is not currently on any blood thinning or seizure medications.  See history of present illness. All other systems are negative.    PAST MEDICAL HISTORY       SURGICAL HISTORY  patient denies any surgical history    SOCIAL HISTORY  Social History   Substance Use Topics   • Smoking status: Current Every Day Smoker   • Smokeless tobacco: Never Used   • Alcohol use No      History   Drug Use No       FAMILY HISTORY  No family history on file.    CURRENT MEDICATIONS  No current facility-administered medications on file prior to encounter.      No current outpatient prescriptions on file prior to encounter.     ALLERGIES  No Known Allergies    PHYSICAL EXAM  VITAL SIGNS: /63   Pulse 83   Temp 37.1 °C (98.7 °F)   Resp 16   Ht 1.778 m (5' 10\")   Wt 117.9 kg (260 lb)   BMI 37.31 kg/m²     Constitutional: Well developed, Well nourished, No acute distress, Non-toxic appearance.   HEENT: Normocephalic, Atraumatic,  external ears normal, pharynx pink,  Mucous  Membranes moist, No rhinorrhea or mucosal edema  Eyes: PERRL, EOMI, Conjunctiva normal, No discharge.   Neck: Normal range of motion, No tenderness, Supple, No " stridor.   Lymphatic: No lymphadenopathy    Cardiovascular: Regular Rate and Rhythm, No murmurs,  rubs, or gallops.   Thorax & Lungs: Lungs clear to auscultation bilaterally, No respiratory distress, No wheezes, rhales or rhonchi, No chest wall tenderness.   Abdomen: Bowel sounds normal, Soft, non tender, non distended,  No pulsatile masses., no rebound guarding or peritoneal signs.   Skin: Warm, Dry, No erythema, No rash,   Back:  No CVA tenderness,  No spinal tenderness, bony crepitance, step offs, or instability.   Neurologic: Minor left-sided facial weakness, Left arm with slight drift, NIH score of 5, Visual fields normal, Alert & oriented x 3, Normal sensory function,  Extremities: Equal, intact distal pulses, No cyanosis, clubbing or edema,  No tenderness.   Musculoskeletal: Good range of motion in all major joints. No tenderness to palpation or major deformities noted.     DIAGNOSTIC STUDIES / PROCEDURES    LABS  Results for orders placed or performed during the hospital encounter of 09/19/18   CBC WITH DIFFERENTIAL   Result Value Ref Range    WBC 8.7 4.8 - 10.8 K/uL    RBC 5.07 4.70 - 6.10 M/uL    Hemoglobin 15.0 14.0 - 18.0 g/dL    Hematocrit 44.7 42.0 - 52.0 %    MCV 88.2 81.4 - 97.8 fL    MCH 29.6 27.0 - 33.0 pg    MCHC 33.6 (L) 33.7 - 35.3 g/dL    RDW 43.0 35.9 - 50.0 fL    Platelet Count 368 164 - 446 K/uL    MPV 10.0 9.0 - 12.9 fL    Neutrophils-Polys 70.40 44.00 - 72.00 %    Lymphocytes 10.50 (L) 22.00 - 41.00 %    Monocytes 13.20 0.00 - 13.40 %    Eosinophils 4.10 0.00 - 6.90 %    Basophils 1.30 0.00 - 1.80 %    Immature Granulocytes 0.50 0.00 - 0.90 %    Nucleated RBC 0.00 /100 WBC    Neutrophils (Absolute) 6.15 1.82 - 7.42 K/uL    Lymphs (Absolute) 0.92 (L) 1.00 - 4.80 K/uL    Monos (Absolute) 1.15 (H) 0.00 - 0.85 K/uL    Eos (Absolute) 0.36 0.00 - 0.51 K/uL    Baso (Absolute) 0.11 0.00 - 0.12 K/uL    Immature Granulocytes (abs) 0.04 0.00 - 0.11 K/uL    NRBC (Absolute) 0.00 K/uL   COMP METABOLIC  PANEL   Result Value Ref Range    Sodium 138 135 - 145 mmol/L    Potassium 4.3 3.6 - 5.5 mmol/L    Chloride 104 96 - 112 mmol/L    Co2 25 20 - 33 mmol/L    Anion Gap 9.0 0.0 - 11.9    Glucose 85 65 - 99 mg/dL    Bun 15 8 - 22 mg/dL    Creatinine 1.01 0.50 - 1.40 mg/dL    Calcium 9.0 8.5 - 10.5 mg/dL    AST(SGOT) 17 12 - 45 U/L    ALT(SGPT) 24 2 - 50 U/L    Alkaline Phosphatase 68 30 - 99 U/L    Total Bilirubin 0.5 0.1 - 1.5 mg/dL    Albumin 4.0 3.2 - 4.9 g/dL    Total Protein 6.8 6.0 - 8.2 g/dL    Globulin 2.8 1.9 - 3.5 g/dL    A-G Ratio 1.4 g/dL   PROTHROMBIN TIME   Result Value Ref Range    PT 14.4 12.0 - 14.6 sec    INR 1.11 0.87 - 1.13   APTT   Result Value Ref Range    APTT 29.6 24.7 - 36.0 sec   COD (ADULT)   Result Value Ref Range    ABO Grouping Only A     Rh Grouping Only POS     Antibody Screen-Cod NEG    TROPONIN   Result Value Ref Range    Troponin I <0.01 0.00 - 0.04 ng/mL   URINALYSIS CULTURE, IF INDICATED   Result Value Ref Range    Color Yellow     Character Clear     Specific Gravity 1.014 <1.035    Ph 7.0 5.0 - 8.0    Glucose Negative Negative mg/dL    Ketones Negative Negative mg/dL    Protein Negative Negative mg/dL    Bilirubin Negative Negative    Urobilinogen, Urine 0.2 Negative    Nitrite Negative Negative    Leukocyte Esterase Negative Negative    Occult Blood Trace (A) Negative    Micro Urine Req Microscopic    ESTIMATED GFR   Result Value Ref Range    GFR If African American >60 >60 mL/min/1.73 m 2    GFR If Non African American >60 >60 mL/min/1.73 m 2   URINE MICROSCOPIC (W/UA)   Result Value Ref Range    WBC 0-2 (A) /hpf    RBC 5-10 (A) /hpf    Bacteria Negative None /hpf    Epithelial Cells Negative /hpf    Hyaline Cast 0-2 /lpf   EKG (NOW)   Result Value Ref Range    Report       [facility]    Test Date:  2018-09-19  Pt Name:    JOAN JUAN MANUEL                 Department: ER  MRN:        [id number]                      Room:       RD 04  Gender:     Male                          Technician: [id number]  :        [id number]                   Requested By:ANDREW JURADO  Order #:    [id number]                    Reading MD:    Measurements  Intervals                                Axis  Rate:       89                           P:          57  OH:         180                          QRS:        -68  QRSD:       86                           T:          67  QT:         368  QTc:        448    Interpretive Statements  SINUS RHYTHM  LEFT ANTERIOR FASCICULAR BLOCK  CONSIDER RIGHT VENTRICULAR HYPERTROPHY  BORDERLINE R WAVE PROGRESSION, ANTERIOR LEADS  No previous ECG available for comparison         All labs reviewed by me.    EKG  12 lead EKG; Interpreted by emergency department physician    Rhythm: Normal Sinus Rhythm   Rate: 89 bpm  Axis: Normal  R Wave: Normal R wave progression  Normal ST-T segments  ST Segments: No ST segment elevation   T waves: Normal  Q waves: None    Clinical Impression: No acute changes    RADIOLOGY  DX-CHEST-PORTABLE (1 VIEW)   Final Result      1.  Prominent cardiac silhouette and mediastinal contours are likely related to low lung volumes and portable AP technique.      CT-HEAD W/O   Final Result   Addendum 1 of 1   There is subtle hemorrhage located centrally within the right basal    ganglia and insula. These findings are consistent with infarct.      Findings were discussed with ANDREW JURADO on 2018 3:34 PM.      Final      Right-sided mass effect with compression of the right lateral ventricle and 3.4 mm right to left midline shift.   Low-attenuation within the right caudate nucleus, basal ganglia, and right insula suggestive of edema possibly ischemic.   No acute intracranial hemorrhage.      CT-CEREBRAL PERFUSION ANALYSIS   Final Result      1.  CT perfusion examination over the limited section of brain reveals 0 mL of brain parenchyma has less than 30% of cerebral blood flow (CBF).      2.  Please note that the cerebral perfusion was  performed on the limited brain tissue around the basal ganglia region. Infarct/ischemia outside the CT perfusion sections can be missed in this study.      CT-CTA HEAD WITH & W/O-POST PROCESS    (Results Pending)   CT-CTA NECK WITH & W/O-POST PROCESSING    (Results Pending)   MR-BRAIN-W/O    (Results Pending)     The radiologist's interpretation of all radiological studies have been reviewed by me.    COURSE & MEDICAL DECISION MAKING  Nursing notes, VS, PMSFHx reviewed in chart.    2:46 PM Patient seen and examined at bedside. Patient was seen by myself and Dr. Monique.  Ordered DX-Chest, CT-Head w/o, CT-Cerebral perfusion analysis, CT-CTA-Head with and without post process, CT-CTA neck with and without post processing, CBC with differential, CMP, Prothrombin Time, APTT, COD (Adult), Troponin, Urinalysis culture if indicated, EKG to evaluate his symptoms. The differential diagnoses include but are not limited to: CVA, TIA, intracranial hemorrhage, seizure    4:11 PM the patient will be admitted to the hospitalist service for serial neurologic exams and will be given IV Keppra in the emergency department.  Dr. Monique believes he is having a seizure because of his previous large stroke 2 weeks ago.  The patient is not a TPA candidate as he does have a small amount of hemorrhage within the stroke.  I spoke with Dr. Ruiz neurosurgery who states that there is nothing for him to do that he will consult on the case as needed.      Patient will be admitted in guarded condition.  I spoke with the hospitalist who steps him for admission.    FINAL IMPRESSION  1. Seizure (HCC) Active   2. Cerebrovascular accident (CVA), unspecified mechanism (HCC)          Tariq ALCARAZ (Dangelo), am scribing for, and in the presence of, Marcela Zacarias M.D..    Electronically signed by: Tariq De La Garza (Dangelo), 9/19/2018    Marcela ALCARAZ M.D. personally performed the services described in this documentation, as scribed by Tariq De La Garza  in my presence, and it is both accurate and complete. C.    The note accurately reflects work and decisions made by me.  Marcela Zacarias  9/19/2018  4:12 PM

## 2018-09-19 NOTE — PROGRESS NOTES
"Patient was with Pablito in OT when began to c/o \"Feeling weird\" and \"tingling on L side of body.\"  MD notified and in to see patient, stat head CT w/i 2hrs.  Scheduling cannot get patient in until 5pm, patient being sent to ER.    Patient notified, I asked if he would like me to call anyone , he waved me off and said \"I'm on the phone with my mom right now.\"  His family (brother, son) is scheduled to come in 20min, per my conversation with them earlier.    CN to call [facility]  "

## 2018-09-19 NOTE — PROGRESS NOTES
Received bedside shift report from Olivia TAPIA RN regarding patient and assumed care. Patient awake, calm and stable, currently positioned in bed for comfort and safety; call light within reach. Denies pain or discomfort at this time. Will continue to monitor.

## 2018-09-19 NOTE — CARE PLAN
Problem: Communication  Goal: The ability to communicate needs accurately and effectively will improve  Encouraged to make needs known to staff and to use call light for staff assist.  Pt does not use call light for assistance.  Always kept within reach.    Problem: Safety  Goal: Will remain free from falls  Call light kept within reach and encouraged to use for assistance and with toileting needs. Encouraged to call staff and to wait for staff before transfers.  Q hourly rounding done.  Bed alarm is in place.  Room close to nurse's station.  Also tv monitored.  Pt is impulsive and does not wait for staff for transfer assistance.

## 2018-09-19 NOTE — PROGRESS NOTES
Called [facility] to send pt to ER per MD due to increased numbness on L side of the body in order to get head CT. Pt brothers notified in person.

## 2018-09-19 NOTE — PROGRESS NOTES
Pt continues with cursing at staff and impulsiveness.  Bed alarms and chair alarms in place as appropriate.  Has poor safety awareness, does not use call light for staff assistance.

## 2018-09-19 NOTE — ED NOTES
Late Entry-    Pt bib [facility], from [facility] with c/c left side facial droop & lower limb numbness.  Per EMS, pt was being assessed at 1:15pm when physician noticed changes.

## 2018-09-19 NOTE — DISCHARGE INSTRUCTIONS
Occupational Therapy Discharge Instructions for Filiberto Klein    9/19/2018    Level of Assist Required for Eating: Requires Supervision with Eating  Level of Assist Required for Grooming: Requires Supervision with Grooming  Level of Assist Required for Dressing: Requires Supervision with Dressing  Level of Assist Required for Toileting: Requires Supervision with Toileting  Level of Assist Required for Toilet Transfer: Requires Supervision with Toilet Transfer  Equipment for Toilet Transfer: Grab Bars by Toilet  Level of Assist Required for Bathing: Requires Supervision with Bathing  Equipment for Bathing: Shower Chair, Grab Bars in Tub / Shower, Hand Held Shower Head  Level of Assist Required for Shower Transfer: Requires Supervision with Shower Transfer  Equipment for Shower Transfer: Grab Bars in Tub / Shower, Shower Chair  Level of Assist Required for Home Mgmt: Requires Physical Assist with Home Management  Level of Assist Required for Meal Prep: Requires Physical Assist with Meal Preparation  Driving: May not Drive, Please Contact Physician for Further Information  Home Exercise Program: None Issued

## 2018-09-19 NOTE — CONSULTS
Date of Admission: 9/19/2018    Today's Date: 09/19/18    Consulting Physician: Marcela Zacarias M.D.      CC: Stroke symptoms per EMS report      HPI:    IVAN Francis MRN PRIOR [id number] is a 53 y.o. male who presents today in initial neurologic consultation for symptoms suggesting acute stroke.  At rehab this AM when noted speech slurred more than normal moderate to severe, left arm and leg weaker than prior.  Stroke approx 2wks ago seen here by me.  Notes left face and arm twitching/chest tightening prior.  No other associated symptoms.  No exacerbating or alleviating factors.  No other complaints.         ROS:   Constitutional: No fevers or chills.  Eyes: No blurry vision or eye pain.  ENT: No dysphagia or hearing loss.  Respiratory: No cough or shortness of breath.  Cardiovascular: No chest pain or palpitations.  GI: No nausea, vomiting, or diarrhea.  : No urinary incontinence or dysuria.  Musculoskeletal: No joint swelling or arthralgias.  Skin: No skin rashes.  Neuro: No headaches, dizziness, or tremors.  Endocrine: No heat or cold intolerance. No polydipsia or polyuria.  Psych: No depression or anxiety.  Heme/Lymph: No easy bruising or swollen lymph nodes.  All other review of systems were reviewed and were negative.     Past Medical History: No past medical history on file.    Past Surgical History: No past surgical history on file.    Social History:   Social History     Social History   • Marital status: N/A     Spouse name: N/A   • Number of children: N/A   • Years of education: N/A     Occupational History   • Not on file.     Social History Main Topics   • Smoking status: Current Every Day Smoker   • Smokeless tobacco: Never Used   • Alcohol use No   • Drug use: No   • Sexual activity: Not on file     Other Topics Concern   • Not on file     Social History Narrative   • No narrative on file       Family Hx: No family history on file.    Current Medications:   Current Facility-Administered  "Medications:   •  LORazepam (ATIVAN) injection 1 mg, 1 mg, Intravenous, Once, Reg Monique M.D.  •  levETIRAcetam (KEPPRA) 3,000 mg in  mL IVPB, 3,000 mg, Intravenous, Once, Reg Monique M.D.  No current outpatient prescriptions on file.    Allergies: No Known Allergies      Physical Exam:     Vitals:    09/19/18 1505 09/19/18 1507   BP:  113/63   Pulse:  83   Resp:  16   Temp:  37.1 °C (98.7 °F)   Weight: 117.9 kg (260 lb)    Height: 1.778 m (5' 10\")        General: NCAT    Skin: No rashes observed head/neck or body     Head/Neck: NCAT no meningismus neg kernig neg brudzinski. No obvious mass or heard bruit. No tender arteries or lost pulses.     Eyes/Funduscopic: unable    Mental Status: Awake, alert, oriented x 3. Names/repeats/fluent/follows commands. No neglect/extinction. Attention and concentration, recent & remote memory, Fund of Knowledge wnl.    Cranial Nerves: left low face weak and dysarthria, other CN II-XII intact. PERRL 3mm.   No afferent pupillary defect. EOM full  , Visual Field full . left facial weakness. No nystagmus.       Motor: bulk wnl  tone wnl. Left arm and leg drift arm is profound, otherwise intact/symmetric. no abn mvmts     Sensory:  Decreased on left otherwise intact/symmetric to light touch & sharp    Coordination: no dysmetria     DTR's: intact/sym. no clonus. Toes mute.    Gait/Station: n/a fall concern       NIHSS score:  1a. LOC:   1b. LOC Questions:   1c. LOC Commands:   2. Best Gaze:   3. Visual Fields:   4. Facial Paresis: 1  5a. Motor arm left: 1  5b. Motor arm right:   6a. Motor leg left: 1  6b. Motor leg right:   7. Sensory:1  8. Best Language:   9. Limb Ataxia:   10. Dysarthria: 1  11. Extinction/Inattention:     Total Score: 5        Labs:  Recent Labs      09/19/18   1435   WBC  8.7   RBC  5.07   HEMOGLOBIN  15.0   HEMATOCRIT  44.7   MCV  88.2   MCH  29.6   MCHC  33.6*   RDW  43.0   PLATELETCT  368   MPV  10.0     Recent Labs      09/19/18   1435   SODIUM  138 "   POTASSIUM  4.3   CHLORIDE  104   CO2  25   GLUCOSE  85   BUN  15   CREATININE  1.01   CALCIUM  9.0         Recent Labs      09/19/18   1435   TROPONINI  <0.01         Recent Labs      09/19/18   1435   SODIUM  138   POTASSIUM  4.3   CHLORIDE  104   CO2  25   GLUCOSE  85   BUN  15     Recent Labs      09/19/18   1435   SODIUM  138   POTASSIUM  4.3   CHLORIDE  104   CO2  25   BUN  15   CREATININE  1.01   CALCIUM  9.0         No results found for this or any previous visit.            Imaging reviewed & visualized by me  CT-CEREBRAL PERFUSION ANALYSIS   Final Result      1.  CT perfusion examination over the limited section of brain reveals 0 mL of brain parenchyma has less than 30% of cerebral blood flow (CBF).      2.  Please note that the cerebral perfusion was performed on the limited brain tissue around the basal ganglia region. Infarct/ischemia outside the CT perfusion sections can be missed in this study.      CT-HEAD W/O   Final Result      Right-sided mass effect with compression of the right lateral ventricle and 3.4 mm right to left midline shift.   Low-attenuation within the right caudate nucleus, basal ganglia, and right insula suggestive of edema possibly ischemic.   No acute intracranial hemorrhage.      DX-CHEST-PORTABLE (1 VIEW)    (Results Pending)   CT-CTA HEAD WITH & W/O-POST PROCESS    (Results Pending)   CT-CTA NECK WITH & W/O-POST PROCESSING    (Results Pending)            Assessment/Plan:    Impression:  53 y.o. male with symptoms consistent with acute ischemic stroke  But suspect sz with post ictal deficit 2' to old stroke with mild old hem transformation  Decision NOT to give alteplase: recent stroke <3m  Contraindication for IVtPA: as above      Presumed mechanism by TOAST:  __Large Artery Atherosclerosis  __Small Vessel (Lacunar)  __Cardioembolic  _x_Other (Sickle Cell, Vasculitis, Hypercoagulable)  __Unknown      Recommendations:    Admit to the NEURO FLOOR WITH RN CORE STAFF with NON-TPA  protocol    ALLOW PERMISSIVE HYPERTENSION, do not treat BP unless >220/>120; if no stroke, ok to correct bp     Nothing by mouth until cleared by speech therapy    ASA 81mg PO/WI. Ok to start prior antithrombotic regimen.    NS @ 50cc/hr    PT and OT will evaluate and treat the patient, PMR consult    keppra load IV 3g, then 750 q12h    sz precautions    mrb pending    No further neuro workup as inpatient if aforementioned studies WNL & maintains neuro baseline.  Neuro sign off, reconsult PRN.  F/u otpt Neuro MD in coming months.         Proximally 40 minutes of critical care time were spent in caring for this patient with acute ischemic stroke receiving thrombolytics going to endovascular treatment with high risk of worsening brain function or death.      Reg Monique M.D.  , Neurohospitalist & Stroke  Clinical Professor, [employer]  Diplomate, Neurology & Neuroimaging

## 2018-09-20 ENCOUNTER — APPOINTMENT (OUTPATIENT)
Dept: RADIOLOGY | Facility: MEDICAL CENTER | Age: 53
DRG: 064 | End: 2018-09-20
Attending: PSYCHIATRY & NEUROLOGY
Payer: COMMERCIAL

## 2018-09-20 LAB
ALBUMIN SERPL BCP-MCNC: 3.7 G/DL (ref 3.2–4.9)
ALBUMIN/GLOB SERPL: 1.4 G/DL
ALP SERPL-CCNC: 64 U/L (ref 30–99)
ALT SERPL-CCNC: 19 U/L (ref 2–50)
ANION GAP SERPL CALC-SCNC: 8 MMOL/L (ref 0–11.9)
AST SERPL-CCNC: 13 U/L (ref 12–45)
BASOPHILS # BLD AUTO: 1.2 % (ref 0–1.8)
BASOPHILS # BLD: 0.09 K/UL (ref 0–0.12)
BILIRUB SERPL-MCNC: 0.5 MG/DL (ref 0.1–1.5)
BUN SERPL-MCNC: 16 MG/DL (ref 8–22)
CALCIUM SERPL-MCNC: 8.8 MG/DL (ref 8.5–10.5)
CHLORIDE SERPL-SCNC: 107 MMOL/L (ref 96–112)
CHOLEST SERPL-MCNC: 76 MG/DL (ref 100–199)
CK SERPL-CCNC: 42 U/L (ref 0–154)
CO2 SERPL-SCNC: 24 MMOL/L (ref 20–33)
CREAT SERPL-MCNC: 0.95 MG/DL (ref 0.5–1.4)
EOSINOPHIL # BLD AUTO: 0.35 K/UL (ref 0–0.51)
EOSINOPHIL NFR BLD: 4.8 % (ref 0–6.9)
ERYTHROCYTE [DISTWIDTH] IN BLOOD BY AUTOMATED COUNT: 41.9 FL (ref 35.9–50)
EST. AVERAGE GLUCOSE BLD GHB EST-MCNC: 114 MG/DL
GLOBULIN SER CALC-MCNC: 2.7 G/DL (ref 1.9–3.5)
GLUCOSE SERPL-MCNC: 94 MG/DL (ref 65–99)
HBA1C MFR BLD: 5.6 % (ref 0–5.6)
HCT VFR BLD AUTO: 42.4 % (ref 42–52)
HDLC SERPL-MCNC: 26 MG/DL
HGB BLD-MCNC: 14.4 G/DL (ref 14–18)
IMM GRANULOCYTES # BLD AUTO: 0.04 K/UL (ref 0–0.11)
IMM GRANULOCYTES NFR BLD AUTO: 0.6 % (ref 0–0.9)
LDLC SERPL CALC-MCNC: 12 MG/DL
LYMPHOCYTES # BLD AUTO: 1.01 K/UL (ref 1–4.8)
LYMPHOCYTES NFR BLD: 13.9 % (ref 22–41)
MCH RBC QN AUTO: 29.6 PG (ref 27–33)
MCHC RBC AUTO-ENTMCNC: 34 G/DL (ref 33.7–35.3)
MCV RBC AUTO: 87.1 FL (ref 81.4–97.8)
MONOCYTES # BLD AUTO: 0.85 K/UL (ref 0–0.85)
MONOCYTES NFR BLD AUTO: 11.7 % (ref 0–13.4)
NEUTROPHILS # BLD AUTO: 4.92 K/UL (ref 1.82–7.42)
NEUTROPHILS NFR BLD: 67.8 % (ref 44–72)
NRBC # BLD AUTO: 0 K/UL
NRBC BLD-RTO: 0 /100 WBC
PLATELET # BLD AUTO: 330 K/UL (ref 164–446)
PMV BLD AUTO: 10 FL (ref 9–12.9)
POTASSIUM SERPL-SCNC: 4.2 MMOL/L (ref 3.6–5.5)
PROT SERPL-MCNC: 6.4 G/DL (ref 6–8.2)
RBC # BLD AUTO: 4.87 M/UL (ref 4.7–6.1)
SODIUM SERPL-SCNC: 139 MMOL/L (ref 135–145)
TRIGL SERPL-MCNC: 191 MG/DL (ref 0–149)
TSH SERPL DL<=0.005 MIU/L-ACNC: 0.61 UIU/ML (ref 0.38–5.33)
WBC # BLD AUTO: 7.3 K/UL (ref 4.8–10.8)

## 2018-09-20 PROCEDURE — A9270 NON-COVERED ITEM OR SERVICE: HCPCS | Performed by: FAMILY MEDICINE

## 2018-09-20 PROCEDURE — G8988 SELF CARE GOAL STATUS: HCPCS | Mod: CI

## 2018-09-20 PROCEDURE — 99232 SBSQ HOSP IP/OBS MODERATE 35: CPT | Performed by: PSYCHIATRY & NEUROLOGY

## 2018-09-20 PROCEDURE — G8997 SWALLOW GOAL STATUS: HCPCS | Mod: CI

## 2018-09-20 PROCEDURE — 700111 HCHG RX REV CODE 636 W/ 250 OVERRIDE (IP): Performed by: HOSPITALIST

## 2018-09-20 PROCEDURE — G8996 SWALLOW CURRENT STATUS: HCPCS | Mod: CI

## 2018-09-20 PROCEDURE — 700111 HCHG RX REV CODE 636 W/ 250 OVERRIDE (IP): Performed by: PSYCHIATRY & NEUROLOGY

## 2018-09-20 PROCEDURE — G8978 MOBILITY CURRENT STATUS: HCPCS | Mod: CJ

## 2018-09-20 PROCEDURE — G8979 MOBILITY GOAL STATUS: HCPCS | Mod: CI

## 2018-09-20 PROCEDURE — 92610 EVALUATE SWALLOWING FUNCTION: CPT

## 2018-09-20 PROCEDURE — G8987 SELF CARE CURRENT STATUS: HCPCS | Mod: CJ

## 2018-09-20 PROCEDURE — 700102 HCHG RX REV CODE 250 W/ 637 OVERRIDE(OP): Performed by: SPECIALIST

## 2018-09-20 PROCEDURE — 97166 OT EVAL MOD COMPLEX 45 MIN: CPT

## 2018-09-20 PROCEDURE — 700102 HCHG RX REV CODE 250 W/ 637 OVERRIDE(OP): Performed by: FAMILY MEDICINE

## 2018-09-20 PROCEDURE — 85025 COMPLETE CBC W/AUTO DIFF WBC: CPT

## 2018-09-20 PROCEDURE — 99232 SBSQ HOSP IP/OBS MODERATE 35: CPT | Performed by: SPECIALIST

## 2018-09-20 PROCEDURE — 36415 COLL VENOUS BLD VENIPUNCTURE: CPT

## 2018-09-20 PROCEDURE — A9270 NON-COVERED ITEM OR SERVICE: HCPCS | Performed by: SPECIALIST

## 2018-09-20 PROCEDURE — 95951 EEG: CPT | Mod: 26,52 | Performed by: PSYCHIATRY & NEUROLOGY

## 2018-09-20 PROCEDURE — 80053 COMPREHEN METABOLIC PANEL: CPT

## 2018-09-20 PROCEDURE — 80061 LIPID PANEL: CPT

## 2018-09-20 PROCEDURE — 95951 EEG: CPT | Mod: 52 | Performed by: PSYCHIATRY & NEUROLOGY

## 2018-09-20 PROCEDURE — 700111 HCHG RX REV CODE 636 W/ 250 OVERRIDE (IP): Performed by: SPECIALIST

## 2018-09-20 PROCEDURE — 83036 HEMOGLOBIN GLYCOSYLATED A1C: CPT

## 2018-09-20 PROCEDURE — 770020 HCHG ROOM/CARE - TELE (206)

## 2018-09-20 PROCEDURE — 97162 PT EVAL MOD COMPLEX 30 MIN: CPT

## 2018-09-20 PROCEDURE — 700105 HCHG RX REV CODE 258: Performed by: PSYCHIATRY & NEUROLOGY

## 2018-09-20 RX ORDER — CALCIUM CARBONATE 500 MG/1
500 TABLET, CHEWABLE ORAL EVERY 4 HOURS PRN
Status: DISCONTINUED | OUTPATIENT
Start: 2018-09-20 | End: 2018-09-21

## 2018-09-20 RX ORDER — POLYETHYLENE GLYCOL 3350 17 G/17G
1 POWDER, FOR SOLUTION ORAL DAILY
Status: DISCONTINUED | OUTPATIENT
Start: 2018-09-20 | End: 2018-09-23 | Stop reason: HOSPADM

## 2018-09-20 RX ORDER — TRAZODONE HYDROCHLORIDE 100 MG/1
50 TABLET ORAL NIGHTLY
Status: DISCONTINUED | OUTPATIENT
Start: 2018-09-20 | End: 2018-09-21

## 2018-09-20 RX ORDER — LANOLIN ALCOHOL/MO/W.PET/CERES
3 CREAM (GRAM) TOPICAL
Status: DISCONTINUED | OUTPATIENT
Start: 2018-09-20 | End: 2018-09-20

## 2018-09-20 RX ORDER — ASPIRIN 81 MG/1
81 TABLET, CHEWABLE ORAL DAILY
Status: DISCONTINUED | OUTPATIENT
Start: 2018-09-20 | End: 2018-09-20

## 2018-09-20 RX ORDER — OXYCODONE HYDROCHLORIDE 10 MG/1
10 TABLET ORAL EVERY 4 HOURS PRN
Status: DISCONTINUED | OUTPATIENT
Start: 2018-09-20 | End: 2018-09-23 | Stop reason: HOSPADM

## 2018-09-20 RX ORDER — OXYCODONE HYDROCHLORIDE 5 MG/1
5 TABLET ORAL EVERY 4 HOURS PRN
Status: DISCONTINUED | OUTPATIENT
Start: 2018-09-20 | End: 2018-09-23 | Stop reason: HOSPADM

## 2018-09-20 RX ORDER — METHOCARBAMOL 500 MG/1
500 TABLET, FILM COATED ORAL 4 TIMES DAILY PRN
Status: DISCONTINUED | OUTPATIENT
Start: 2018-09-20 | End: 2018-09-23 | Stop reason: HOSPADM

## 2018-09-20 RX ORDER — LORAZEPAM 2 MG/ML
1 INJECTION INTRAMUSCULAR
Status: COMPLETED | OUTPATIENT
Start: 2018-09-20 | End: 2018-09-20

## 2018-09-20 RX ORDER — ATORVASTATIN CALCIUM 80 MG/1
80 TABLET, FILM COATED ORAL NIGHTLY
Status: DISCONTINUED | OUTPATIENT
Start: 2018-09-20 | End: 2018-09-20

## 2018-09-20 RX ORDER — KETOROLAC TROMETHAMINE 30 MG/ML
15 INJECTION, SOLUTION INTRAMUSCULAR; INTRAVENOUS ONCE
Status: COMPLETED | OUTPATIENT
Start: 2018-09-20 | End: 2018-09-20

## 2018-09-20 RX ADMIN — ATORVASTATIN CALCIUM 80 MG: 80 TABLET, FILM COATED ORAL at 18:26

## 2018-09-20 RX ADMIN — TRAZODONE HYDROCHLORIDE 50 MG: 100 TABLET ORAL at 20:18

## 2018-09-20 RX ADMIN — OXYCODONE HYDROCHLORIDE 10 MG: 10 TABLET ORAL at 10:37

## 2018-09-20 RX ADMIN — SODIUM CHLORIDE 750 MG: 9 INJECTION, SOLUTION INTRAVENOUS at 18:00

## 2018-09-20 RX ADMIN — ANTACID TABLETS 500 MG: 500 TABLET, CHEWABLE ORAL at 19:41

## 2018-09-20 RX ADMIN — LORAZEPAM 1 MG: 2 INJECTION INTRAMUSCULAR; INTRAVENOUS at 22:08

## 2018-09-20 RX ADMIN — STANDARDIZED SENNA CONCENTRATE AND DOCUSATE SODIUM 2 TABLET: 8.6; 5 TABLET ORAL at 12:05

## 2018-09-20 RX ADMIN — OXYCODONE HYDROCHLORIDE 10 MG: 10 TABLET ORAL at 18:26

## 2018-09-20 RX ADMIN — KETOROLAC TROMETHAMINE 15 MG: 30 INJECTION, SOLUTION INTRAMUSCULAR; INTRAVENOUS at 03:22

## 2018-09-20 RX ADMIN — SODIUM CHLORIDE 750 MG: 9 INJECTION, SOLUTION INTRAVENOUS at 05:43

## 2018-09-20 RX ADMIN — OXYCODONE HYDROCHLORIDE 10 MG: 10 TABLET ORAL at 14:30

## 2018-09-20 RX ADMIN — ASPIRIN 300 MG: 300 SUPPOSITORY RECTAL at 04:38

## 2018-09-20 RX ADMIN — STANDARDIZED SENNA CONCENTRATE AND DOCUSATE SODIUM 2 TABLET: 8.6; 5 TABLET ORAL at 18:25

## 2018-09-20 RX ADMIN — ANTACID TABLETS 500 MG: 500 TABLET, CHEWABLE ORAL at 14:30

## 2018-09-20 ASSESSMENT — COGNITIVE AND FUNCTIONAL STATUS - GENERAL
MOVING TO AND FROM BED TO CHAIR: A LITTLE
HELP NEEDED FOR BATHING: A LITTLE
SUGGESTED CMS G CODE MODIFIER MOBILITY: CK
MOBILITY SCORE: 19
CLIMB 3 TO 5 STEPS WITH RAILING: A LITTLE
HELP NEEDED FOR BATHING: A LITTLE
MOBILITY SCORE: 15
TOILETING: A LITTLE
PERSONAL GROOMING: A LITTLE
STANDING UP FROM CHAIR USING ARMS: A LITTLE
DRESSING REGULAR LOWER BODY CLOTHING: A LITTLE
SUGGESTED CMS G CODE MODIFIER MOBILITY: CK
MOVING FROM LYING ON BACK TO SITTING ON SIDE OF FLAT BED: A LITTLE
WALKING IN HOSPITAL ROOM: A LITTLE
MOVING TO AND FROM BED TO CHAIR: UNABLE
DRESSING REGULAR UPPER BODY CLOTHING: A LITTLE
TOILETING: A LITTLE
STANDING UP FROM CHAIR USING ARMS: A LITTLE
SUGGESTED CMS G CODE MODIFIER DAILY ACTIVITY: CJ
DAILY ACTIVITIY SCORE: 21
MOVING FROM LYING ON BACK TO SITTING ON SIDE OF FLAT BED: UNABLE
SUGGESTED CMS G CODE MODIFIER DAILY ACTIVITY: CK
WALKING IN HOSPITAL ROOM: A LITTLE
DRESSING REGULAR LOWER BODY CLOTHING: A LITTLE
CLIMB 3 TO 5 STEPS WITH RAILING: A LITTLE
DAILY ACTIVITIY SCORE: 19

## 2018-09-20 ASSESSMENT — ENCOUNTER SYMPTOMS
BLURRED VISION: 0
HEARTBURN: 1
HEADACHES: 1
MUSCULOSKELETAL NEGATIVE: 1
ABDOMINAL PAIN: 0
FOCAL WEAKNESS: 1
VOMITING: 0
CONSTITUTIONAL NEGATIVE: 1
NAUSEA: 0
RESPIRATORY NEGATIVE: 1
PSYCHIATRIC NEGATIVE: 1
DOUBLE VISION: 0
CARDIOVASCULAR NEGATIVE: 1

## 2018-09-20 ASSESSMENT — PAIN SCALES - GENERAL
PAINLEVEL_OUTOF10: 5
PAINLEVEL_OUTOF10: 8
PAINLEVEL_OUTOF10: 10
PAINLEVEL_OUTOF10: 10
PAINLEVEL_OUTOF10: 7
PAINLEVEL_OUTOF10: 0
PAINLEVEL_OUTOF10: 9
PAINLEVEL_OUTOF10: 10

## 2018-09-20 ASSESSMENT — PATIENT HEALTH QUESTIONNAIRE - PHQ9
2. FEELING DOWN, DEPRESSED, IRRITABLE, OR HOPELESS: NOT AT ALL
SUM OF ALL RESPONSES TO PHQ9 QUESTIONS 1 AND 2: 0
2. FEELING DOWN, DEPRESSED, IRRITABLE, OR HOPELESS: NOT AT ALL
1. LITTLE INTEREST OR PLEASURE IN DOING THINGS: NOT AT ALL
1. LITTLE INTEREST OR PLEASURE IN DOING THINGS: NOT AT ALL
SUM OF ALL RESPONSES TO PHQ9 QUESTIONS 1 AND 2: 0

## 2018-09-20 ASSESSMENT — GAIT ASSESSMENTS
DISTANCE (FEET): 50
ASSISTIVE DEVICE: FRONT WHEEL WALKER
GAIT LEVEL OF ASSIST: MINIMAL ASSIST
DEVIATION: ATAXIC;DECREASED BASE OF SUPPORT;DECREASED HEEL STRIKE;DECREASED TOE OFF

## 2018-09-20 ASSESSMENT — LIFESTYLE VARIABLES: ALCOHOL_USE: NO

## 2018-09-20 ASSESSMENT — ACTIVITIES OF DAILY LIVING (ADL): TOILETING: INDEPENDENT

## 2018-09-20 NOTE — CARE PLAN
Problem: Knowledge Deficit  Goal: Knowledge of disease process/condition, treatment plan, diagnostic tests, and medications will improve  Outcome: PROGRESSING SLOWER THAN EXPECTED  Pt. Shows no evidence of learning when educated about why he is in the hospital or what his plan of care is.

## 2018-09-20 NOTE — PROGRESS NOTES
Pt arrived from ER. Pt AACEDRIC Oh. Left side weakness, facial droop, and slurred speech noted. Pt up with x1 HH assist to bathroom. Telemonitor on. Bed alarm on.

## 2018-09-20 NOTE — PROGRESS NOTES
Pt. family is present on the floor, they requested to speak with Dr. Kidd, I informed the Dr. they were here and would like to speak with him.

## 2018-09-20 NOTE — ED NOTES
Son and brother at bedside, son took pt's black cell phone home with him.  PT has an orange t shirt and dark blue sweat pants at bedside.  No other belongings with pt

## 2018-09-20 NOTE — CARE PLAN
Problem: Communication  Goal: The ability to communicate needs accurately and effectively will improve  Outcome: PROGRESSING SLOWER THAN EXPECTED      Problem: Safety  Goal: Will remain free from falls  Outcome: PROGRESSING AS EXPECTED  Pt. Is free from falls today.

## 2018-09-20 NOTE — CARE PLAN
Problem: Safety  Goal: Free from accidental injury  Outcome: PROGRESSING AS EXPECTED  Fall precautions in place. Bed alarm on     Problem: Risk of Aspiration  Goal: Absence of aspiration  Outcome: PROGRESSING AS EXPECTED  Pt NPO until cleared by speech

## 2018-09-20 NOTE — ASSESSMENT & PLAN NOTE
Left side weak    CT scan of the brain: ischemia with possible mild old hemorrhagic transformation.    CTA of the neck: negative.   altered mental status felt possibly seizure related(per neurology); improved   Not TPA candidate due to recent stroke  low-dose aspirin/statin/permissive hypertension   PT/OT/speech involved     Fall precautions.    MRI of the brain: pt could not remain still despite ativan; son liezt trazadone(discontinued)   Echocardiogram: obtained this am, pending   EEG: rt cortical dysfxn noted(poss sz risk); joyce  Will ask PMR to reeval(family proposes PCP appt in [place], [place] on Mon. Am w pursuit of outpt PT/OT/ST thereafter)

## 2018-09-20 NOTE — THERAPY
"Speech Language Therapy Clinical Swallow Evaluation completed.  Functional Status: The patient was seen for clinical swallow evaluation this date. The patient was awake, alert and oriented to self, location but demonstrated poor insight into current medical condition and reason for transfer from rehab. The patient repetitively requested to know \"when I can get out of here today!\" The patient was able to follow oral motor directives with no gross asymmetry or weakness appreciated which appears improved from H&P. The patient was given PO trials of ice chips, nectars, purees, thin liquids and solids. The patient presented with no overt s/s of aspiration or difficulty but required mod-max cues for swallow strategies. At this time, recommend patient begin slightly modified PO diet of D3/thin liquid meal items. Please hold PO with any difficulty.     Recommendations - Diet:  Dysphagia III, Thin Liquid                          Strategies: Direct supervision during meals and Head of Bed at 90 Degrees                          Medication Administration: Whole with Liquid Wash    Plan of Care: Will benefit from Speech Therapy 5 times per week  Post-Acute Therapy: Discharge to a transitional care facility for continued skilled therapy services.    See \"Rehab Therapy-Acute\" Patient Summary Report for complete documentation.   "

## 2018-09-20 NOTE — PROGRESS NOTES
"JESSI Gary witnessed pt. pull out his PIV. Pt. was agitated and angry, swearing at staff. Pt. refuses to allow a new PIV to be placed until he \"gets something to fucking eat.\" I explained to the pt. that he could not have anything to eat or drink until he is evaluated by speech therapy for his safety.     The pt. allowed the CNA to fix the leads on his tele box and take his VS, but the pt. is still upset about not being able to eat and state he \"doesn't care if speech comes or not, I am going to get something to eat today,\" and that \"I don't care what the fucking doctor says.\"    "

## 2018-09-20 NOTE — ASSESSMENT & PLAN NOTE
Proposed to be secondary to old stroke focus.   EEG noted as above  Cont keppra  No apparent sz activity

## 2018-09-20 NOTE — PROGRESS NOTES
Dr. Kidd came in and discussed plan of care with the pt. The pt. is markedly more calm and cooperative after speaking with the

## 2018-09-20 NOTE — THERAPY
"Occupational Therapy Evaluation completed.   Functional Status: Supv supine > EOB, SBA LB dressing without AE, SBA standing grooming at sink, CGA transfers with FWW, CGA toileting (BM NT)  Plan of Care: Will benefit from Occupational Therapy 5 times per week  Discharge Recommendations:  Equipment: No Equipment Needed (Family has all necessary DME in place.) Post-acute therapy: TBD - pt was to DC home from rehab today with family. Requires close 24/7 supv for safety.     See \"Rehab Therapy-Acute\" Patient Summary Report for complete documentation.    53 y.o. male with h/o R CVA ~2weeks ago. Pt was at [facility] and scheduled to DC home with family today when he presented with L facial droop, L-sided numbness. CT shows acute ischemic CVA. (Unclear from notes if new or from prior CVA.) Seen now for OT eval. Pt presents with: cognitive deficits, balance impairment, LUE weakness/incoordination negatively impacting ADL and functional mobility. LUE strength is 4-/5, intact to light touch but pt reports mild numbness, mild-moderate coordination deficit. Pt requires CGA for transfers due to balance impairment and impulsivity. Pt is oriented to month, year, not day; unaware that he is back in hospital (stated he was at the \"rehab place\". Pt has poor attention, is tangential and uses sarcastic(and at times inappropriate) humor to compensate. Pt and family are very eager to DC so they can begin 10 hour drive home. Per pt and family, pt's father was recently dx with cancer and is anticipated to pass soon. On review of rehab notes from 9/18, pt appears close to most recent ADL and transfer abilities. Rehab noted he had poor safety awareness and required 24/7 supv. Pt may benefit from return to [facility], however anticipate resistance based on strong desire to return home and see his father. Per son, pt would DC to pt's brother's home which is fully accessible with all recommended DME in place. Family can provide 24/7 supv. They were planning " on obtaining a rental WC for the trip home. If pt remains IP he would benefit from continued OT to maximize functional independence and safety. Will follow.

## 2018-09-20 NOTE — H&P
Hospital Medicine History & Physical Note    Date of Service  9/19/2018    Primary Care Physician  No primary care provider on file.    Consultants  Neurology    Code Status  Full    Chief Complaint  Slurred speech and altered mental status.    History of Presenting Illness  53 y.o. male with past medical history of recent right MCA stroke 2 weeks ago status post thrombectomy who came in from rehab due to slurred speech and worsening left upper and lower extremities weakness as well as altered mental status.  Also patient was noted to have face and arm twitching.  Neurology consulted who felt that the patient is having acute ischemic stroke and suspected seizure.  CTA of the neck was within normal limits.  CT scan of the brain showed possible ischemia with possible mild hemorrhagic transformation (that was verbally informed by radiologist).  ER physician consulted neurosurgery Dr. Ruiz who did not recommend any surgical intervention and recommended medical management for now.  Patient denies any headache.  No chest pain or shortness of breath.  No fever or chills.    Review of Systems  Review of Systems   Constitutional: Positive for malaise/fatigue. Negative for chills and fever.   HENT: Negative for hearing loss and tinnitus.    Eyes: Negative for blurred vision and double vision.   Respiratory: Negative for cough and hemoptysis.    Cardiovascular: Negative for chest pain and palpitations.   Gastrointestinal: Negative for heartburn, nausea and vomiting.   Genitourinary: Negative for dysuria, frequency and urgency.   Musculoskeletal: Negative for myalgias and neck pain.   Skin: Negative for rash.   Neurological: Positive for speech change, focal weakness, seizures and weakness. Negative for dizziness, loss of consciousness and headaches.   Endo/Heme/Allergies: Does not bruise/bleed easily.   Psychiatric/Behavioral: Negative for depression and suicidal ideas.       Past Medical History  Recent right MCA  stroke.    Surgical History  Denies any    Family History  Family history reviewed and was found noncontributory.    Social History   reports that he has been smoking.  He has never used smokeless tobacco. He reports that he does not drink alcohol or use drugs.    Allergies  No Known Allergies    Medications  Prior to Admission Medications   Prescriptions Last Dose Informant Patient Reported? Taking?   acetaminophen (TYLENOL) 650 MG CR tablet 9/18/2018 at 2002 MAR from Other Facility Yes Yes   Sig: Take 650 mg by mouth every four hours as needed for Mild Pain.   aspirin (ASA) 81 MG Chew Tab chewable tablet 9/19/2018 at 0802 MAR from Other Facility Yes Yes   Sig: Take 81 mg by mouth every day.   atorvastatin (LIPITOR) 80 MG tablet 9/18/2018 at 2002 MAR from Other Facility Yes Yes   Sig: Take 80 mg by mouth every evening.   docusate sodium (COLACE) 100 MG Cap 9/19/2018 at 0801 MAR from Other Facility Yes Yes   Sig: Take 100 mg by mouth every day.   mag hydrox-al hydrox-simeth (MAALOX PLUS ES OR MYLANTA DS) 400-400-40 MG/5ML Suspension 9/18/2018 at 2001 MAR from Other Facility Yes Yes   Sig: Take 20 mL by mouth every 2 hours as needed (Dyspepsia).   melatonin 3 MG Tab 9/18/2018 at 2002 MAR from Other Facility Yes Yes   Sig: Take 3 mg by mouth every bedtime.   methocarbamol (ROBAXIN) 500 MG Tab 9/19/2018 at 0801 MAR from Other Facility Yes Yes   Sig: Take 500 mg by mouth 4 times a day as needed (Muscle Spasms).   ondansetron (ZOFRAN ODT) 4 MG TABLET DISPERSIBLE 9/18/2018 at 1923 MAR from Other Facility Yes Yes   Sig: Take 4 mg by mouth 4 times a day as needed for Nausea.   traZODone (DESYREL) 50 MG Tab 9/18/2018 at 2329 MAR from Other Facility Yes Yes   Sig: Take 50 mg by mouth every evening.   vitamin D (CHOLECALCIFEROL) 1000 UNIT Tab 9/19/2018 at 0801 MAR from Other Facility Yes Yes   Sig: Take 1,000 Units by mouth every day.      Facility-Administered Medications: None       Physical Exam  Temp:  [37.1 °C (98.7 °F)]  37.1 °C (98.7 °F)  Pulse:  [68-93] 68  Resp:  [14-32] 14  BP: (113)/(63) 113/63    Physical Exam   Constitutional: He is oriented to person, place, and time. He appears lethargic. No distress.   HENT:   Head: Normocephalic and atraumatic.   Mouth/Throat: No oropharyngeal exudate.   Eyes: Pupils are equal, round, and reactive to light. Conjunctivae are normal. Left eye exhibits no discharge. No scleral icterus.   Neck: Neck supple. No tracheal deviation present. No thyromegaly present.   Cardiovascular: Normal rate and regular rhythm.  Exam reveals no gallop and no friction rub.    Pulmonary/Chest: Breath sounds normal. No respiratory distress. He has no wheezes.   Abdominal: Soft. Bowel sounds are normal. He exhibits no distension. There is no tenderness.   Musculoskeletal: Normal range of motion. He exhibits no tenderness or deformity.   Neurological: He is oriented to person, place, and time. He appears lethargic.   Strength is 4 out of 5 on left upper and lower extremities.  There is moderate dysmetria on left.  Mild pronator drift on the left as well.   Skin: Skin is dry. He is not diaphoretic. No erythema.   Psychiatric: He has a normal mood and affect.       Laboratory:  Recent Labs      09/19/18   1435   WBC  8.7   RBC  5.07   HEMOGLOBIN  15.0   HEMATOCRIT  44.7   MCV  88.2   MCH  29.6   MCHC  33.6*   RDW  43.0   PLATELETCT  368   MPV  10.0     Recent Labs      09/19/18   1435   SODIUM  138   POTASSIUM  4.3   CHLORIDE  104   CO2  25   GLUCOSE  85   BUN  15   CREATININE  1.01   CALCIUM  9.0     Recent Labs      09/19/18   1435   ALTSGPT  24   ASTSGOT  17   ALKPHOSPHAT  68   TBILIRUBIN  0.5   GLUCOSE  85     Recent Labs      09/19/18   1435   APTT  29.6   INR  1.11             Lab Results   Component Value Date    TROPONINI <0.01 09/19/2018       Urinalysis:    Recent Labs      09/19/18   1514   SPECGRAVITY  1.014   GLUCOSEUR  Negative   KETONES  Negative   NITRITE  Negative   LEUKESTERAS  Negative   WBCURINE   0-2*   RBCURINE  5-10*   BACTERIA  Negative   EPITHELCELL  Negative        Imaging:  CT-CTA NECK WITH & W/O-POST PROCESSING   Final Result      1.  CT angiogram of the neck within normal limits.   2.  Mediastinal adenopathy   3.  BILATERAL upper lobe mixed interstitial and airspace disease which is most likely pulmonary edema or atypical infection      CT-CTA HEAD WITH & W/O-POST PROCESS   Final Result      1.  Narrowing of the right M1 segment at the trifurcation consistent with stenosis. Thrombus not excluded.   2.  Remainder of the [place] arteries are opacified and patent.   3.  Posterior circulation arteries are opacified and patent.   4.  Areas of enhancement within the right caudate nucleus, right base and lingula, right insula, right temporal lobe, and lateral right occipital lobe consistent with ischemic change.   5.  Findings were discussed with Dr. Mcnamara and Dr Zacarias      DX-CHEST-PORTABLE (1 VIEW)   Final Result      1.  Prominent cardiac silhouette and mediastinal contours are likely related to low lung volumes and portable AP technique.      CT-HEAD W/O   Final Result   Addendum 1 of 1   There is subtle hemorrhage located centrally within the right basal    ganglia and insula. These findings are consistent with infarct.      Findings were discussed with ANDREW ZACARIAS on 9/19/2018 3:34 PM.      Final      Right-sided mass effect with compression of the right lateral ventricle and 3.4 mm right to left midline shift.   Low-attenuation within the right caudate nucleus, basal ganglia, and right insula suggestive of edema possibly ischemic.   No acute intracranial hemorrhage.      CT-CEREBRAL PERFUSION ANALYSIS   Final Result      1.  CT perfusion examination over the limited section of brain reveals 0 mL of brain parenchyma has less than 30% of cerebral blood flow (CBF).      2.  Please note that the cerebral perfusion was performed on the limited brain tissue around the basal ganglia region.  Infarct/ischemia outside the CT perfusion sections can be missed in this study.      MR-BRAIN-W/O    (Results Pending)   EC-ECHOCARDIOGRAM COMPLETE W/O CONT    (Results Pending)   [id number] VIEW    (Results Pending)         Assessment/Plan:  I anticipate this patient will require at least two midnights for appropriate medical management, necessitating inpatient admission.    * Acute ischemic stroke (HCC)- (present on admission)   Assessment & Plan    Patient presented with slurred speech as well as worsening weakness on left upper and lower extremities.  CT scan of the brain showed possible ischemia with possible mild old hemorrhagic transformation.  CTA of the neck was within normal limits.  Also patient was having altered mental status which was felt to be she seizure related related per neurology.  Not TPA candidate due to recent stroke.  Continue with low-dose aspirin.  Allow permissive hypertension.  PT/OT/speech.  Keep n.p.o. for now until cleared by speech.  Fall precautions.  MRI of the brain.  Echocardiogram.  EEG for possible seizure.        Altered mental status- (present on admission)   Assessment & Plan    Secondary to above.  Fall and aspiration precautions.  EEG ordered.        Seizure (HCC)- (present on admission)   Assessment & Plan    Patient felt that was having post ictal seizure secondary to old stroke with mild hemorrhagic transformation.  Neurology following.  Patient was given 3 g of Keppra IV x1 and then was placed on 750 mg IV twice daily.  EEG ordered.  Seizure/fall/aspiration precautions.            VTE prophylaxis: SCD's

## 2018-09-20 NOTE — ED NOTES
"PT becoming very agitated.  PT yelling out of room, ripping all monitoring devices off.  All cords and monitors re-applied.  PT very confused, asking that the \"video be paused so that he can pay money out of his own pocket and pay for the children[employer] medical [employer]\"    PT demanding that the IV be removed, pt told that it cannot be removed at this time, pt stated \"then tell the f*^$*ing doctor to give me the good stuff\"  "

## 2018-09-20 NOTE — PROGRESS NOTES
Monitor summary: New @1914 SR 78-86, IA 0.20, QRS 0.10, QT 0.34, with occasional PVCs  per strip from monitor room.

## 2018-09-20 NOTE — PROCEDURES
EEG 09/20/18 3:26 PM  ROUTINE VIDEO ELECTROENCEPHALOGRAM REPORT      NAME: Filiberto Klein    REFERRING Dr:  DR. DE GUZMAN    DURATION: 27 minutes    INDICATION:  speech slurred       TECHNIQUE: 30 channel routine electroencephalogram (EEG) was performed in accordance with the international 10-20 system. The study was reviewed in bipolar and referential montages. The recording examined the patient during wakeful and drowsy state(s).     DESCRIPTION OF THE RECORD:      Background rhythm during awake stage shows well-organized, well-developed, average voltage 10 to 11 hertz alpha activity in the left posterior regions but attenuated over right. No spike-and-wave discharges but monomorphic lateralizing delta abnormalities over right are seen.  Photic stimulation did not produce any abnormalities.  Stage I sleep was achieved.      ACTIVATION PROCEDURES:      Photic Stimulation were done    ICTAL AND/OR INTERICTAL FINDINGS:    No spike-and-wave discharges but monomorphic lateralizing delta abnormalities over right are seen.  No clinical events or seizures were reported or recorded during the study.      EKG: sampling of the EKG recording demonstrated sinus rhythm.        INTERPRETATION:      ________________________________________________________________________    This is abnormal routine video EEG recording in the awake and drowsy/sleep state(s).    This scalp EEG denotes      1. Moderate focal cortical dysfunction /irritibility over right--this patten could increase the risk of seizure - advise clinical correlation regarding management        If clinical suspicion of active seizure remains high.  Prolonged EEG   monitoring may be of help.    EEG 09/20/18 3:46 PM    ________________________________________________________________________

## 2018-09-20 NOTE — PROGRESS NOTES
Called the pt. son Ignacio at [phone], per the pt. request. Ignacio informed me he was in the parking lot and would be up to see the pt. soon. I informed the pt. his son was here and would be up to see him soon.

## 2018-09-20 NOTE — PROGRESS NOTES
"Received bedside handoff from JAMIA Smiht. Pt is disordered to time, but otherwise alert and oriented. Pt. is angry about not being able to eat or drink and states he doesn't \"care if speech comes to see him or not, he's going to get something to eat today.\" Pt. is swearing at staff and states he doesn't care about speech or \"what the fucking doctor\" says.    "

## 2018-09-20 NOTE — PROGRESS NOTES
"Pt. is very upset about his father. He's concerned because his father is terminal. The pt. was crying and asking repeatedly when he can leave so he can \"go see my dad before he dies.\" I listened to the pt. concerns and explained that we will not have a timeline for his discharge until we get the results from all the tests that need to be done so we can see what's going on with him. The pt. expressed understanding that he needed to get the tests done and the results in before we could work on his plans and timeline for discharge, but was still very distraught about not being able to go see his father.   "

## 2018-09-20 NOTE — PROGRESS NOTES
The pt. was making sexually inappropriate comments and jokes to female staff. Pt. was informed that this was not ok, and the behavior was discouraged.

## 2018-09-21 ENCOUNTER — APPOINTMENT (OUTPATIENT)
Dept: RADIOLOGY | Facility: MEDICAL CENTER | Age: 53
DRG: 064 | End: 2018-09-21
Attending: PSYCHIATRY & NEUROLOGY
Payer: COMMERCIAL

## 2018-09-21 PROCEDURE — 92526 ORAL FUNCTION THERAPY: CPT

## 2018-09-21 PROCEDURE — 99232 SBSQ HOSP IP/OBS MODERATE 35: CPT | Performed by: SPECIALIST

## 2018-09-21 PROCEDURE — 770020 HCHG ROOM/CARE - TELE (206)

## 2018-09-21 PROCEDURE — 700111 HCHG RX REV CODE 636 W/ 250 OVERRIDE (IP): Performed by: HOSPITALIST

## 2018-09-21 PROCEDURE — 700102 HCHG RX REV CODE 250 W/ 637 OVERRIDE(OP): Performed by: SPECIALIST

## 2018-09-21 PROCEDURE — A9270 NON-COVERED ITEM OR SERVICE: HCPCS | Performed by: FAMILY MEDICINE

## 2018-09-21 PROCEDURE — A9270 NON-COVERED ITEM OR SERVICE: HCPCS | Performed by: HOSPITALIST

## 2018-09-21 PROCEDURE — 99232 SBSQ HOSP IP/OBS MODERATE 35: CPT | Performed by: PSYCHIATRY & NEUROLOGY

## 2018-09-21 PROCEDURE — A9270 NON-COVERED ITEM OR SERVICE: HCPCS | Performed by: SPECIALIST

## 2018-09-21 PROCEDURE — 700102 HCHG RX REV CODE 250 W/ 637 OVERRIDE(OP): Performed by: FAMILY MEDICINE

## 2018-09-21 PROCEDURE — 700111 HCHG RX REV CODE 636 W/ 250 OVERRIDE (IP): Performed by: PSYCHIATRY & NEUROLOGY

## 2018-09-21 PROCEDURE — 700102 HCHG RX REV CODE 250 W/ 637 OVERRIDE(OP): Performed by: HOSPITALIST

## 2018-09-21 PROCEDURE — 4A10X4Z MONITORING OF CENTRAL NERVOUS ELECTRICAL ACTIVITY, EXTERNAL APPROACH: ICD-10-PCS | Performed by: PSYCHIATRY & NEUROLOGY

## 2018-09-21 PROCEDURE — 700105 HCHG RX REV CODE 258: Performed by: PSYCHIATRY & NEUROLOGY

## 2018-09-21 RX ORDER — LORAZEPAM 2 MG/ML
1 INJECTION INTRAMUSCULAR
Status: COMPLETED | OUTPATIENT
Start: 2018-09-21 | End: 2018-09-21

## 2018-09-21 RX ORDER — ALPRAZOLAM 0.5 MG/1
0.5 TABLET ORAL NIGHTLY PRN
Status: DISCONTINUED | OUTPATIENT
Start: 2018-09-21 | End: 2018-09-23 | Stop reason: HOSPADM

## 2018-09-21 RX ORDER — ACETAMINOPHEN 325 MG/1
650 TABLET ORAL EVERY 6 HOURS PRN
Status: DISCONTINUED | OUTPATIENT
Start: 2018-09-21 | End: 2018-09-23 | Stop reason: HOSPADM

## 2018-09-21 RX ORDER — LEVETIRACETAM 250 MG/1
750 TABLET ORAL 2 TIMES DAILY
Status: DISCONTINUED | OUTPATIENT
Start: 2018-09-21 | End: 2018-09-23 | Stop reason: HOSPADM

## 2018-09-21 RX ORDER — CALCIUM CARBONATE 500 MG/1
1000 TABLET, CHEWABLE ORAL EVERY 4 HOURS PRN
Status: DISCONTINUED | OUTPATIENT
Start: 2018-09-21 | End: 2018-09-23 | Stop reason: HOSPADM

## 2018-09-21 RX ORDER — ALPRAZOLAM 0.5 MG/1
0.5 TABLET ORAL ONCE
Status: COMPLETED | OUTPATIENT
Start: 2018-09-21 | End: 2018-09-21

## 2018-09-21 RX ADMIN — ALPRAZOLAM 0.5 MG: 0.5 TABLET ORAL at 21:33

## 2018-09-21 RX ADMIN — ATORVASTATIN CALCIUM 80 MG: 80 TABLET, FILM COATED ORAL at 17:03

## 2018-09-21 RX ADMIN — POLYETHYLENE GLYCOL 3350 1 PACKET: 17 POWDER, FOR SOLUTION ORAL at 12:38

## 2018-09-21 RX ADMIN — ANTACID TABLETS 500 MG: 500 TABLET, CHEWABLE ORAL at 20:06

## 2018-09-21 RX ADMIN — STANDARDIZED SENNA CONCENTRATE AND DOCUSATE SODIUM 2 TABLET: 8.6; 5 TABLET ORAL at 05:37

## 2018-09-21 RX ADMIN — LORAZEPAM 1 MG: 2 INJECTION INTRAMUSCULAR; INTRAVENOUS at 08:46

## 2018-09-21 RX ADMIN — ALPRAZOLAM 0.5 MG: 0.5 TABLET ORAL at 23:25

## 2018-09-21 RX ADMIN — SODIUM CHLORIDE 750 MG: 9 INJECTION, SOLUTION INTRAVENOUS at 05:38

## 2018-09-21 RX ADMIN — OXYCODONE HYDROCHLORIDE 5 MG: 5 TABLET ORAL at 17:04

## 2018-09-21 RX ADMIN — ACETAMINOPHEN 650 MG: 325 TABLET, FILM COATED ORAL at 23:24

## 2018-09-21 RX ADMIN — ASPIRIN 81 MG: 81 TABLET, CHEWABLE ORAL at 05:37

## 2018-09-21 RX ADMIN — ANTACID TABLETS 1000 MG: 500 TABLET, CHEWABLE ORAL at 23:24

## 2018-09-21 RX ADMIN — LEVETIRACETAM 750 MG: 250 TABLET ORAL at 17:03

## 2018-09-21 RX ADMIN — STANDARDIZED SENNA CONCENTRATE AND DOCUSATE SODIUM 2 TABLET: 8.6; 5 TABLET ORAL at 17:04

## 2018-09-21 RX ADMIN — OXYCODONE HYDROCHLORIDE 5 MG: 5 TABLET ORAL at 21:03

## 2018-09-21 ASSESSMENT — ENCOUNTER SYMPTOMS
GASTROINTESTINAL NEGATIVE: 1
CONSTITUTIONAL NEGATIVE: 1
BLURRED VISION: 0
DOUBLE VISION: 0
HEADACHES: 1
FOCAL WEAKNESS: 1
CARDIOVASCULAR NEGATIVE: 1
MUSCULOSKELETAL NEGATIVE: 1
PSYCHIATRIC NEGATIVE: 1
RESPIRATORY NEGATIVE: 1

## 2018-09-21 ASSESSMENT — PATIENT HEALTH QUESTIONNAIRE - PHQ9
1. LITTLE INTEREST OR PLEASURE IN DOING THINGS: NOT AT ALL
2. FEELING DOWN, DEPRESSED, IRRITABLE, OR HOPELESS: NOT AT ALL
SUM OF ALL RESPONSES TO PHQ9 QUESTIONS 1 AND 2: 0

## 2018-09-21 ASSESSMENT — PAIN SCALES - GENERAL
PAINLEVEL_OUTOF10: 0
PAINLEVEL_OUTOF10: 6

## 2018-09-21 NOTE — PROGRESS NOTES
Pt down to MRI at 2230. MRI unable to be completed due to patient vomiting. Pt back up to unit with son and brother at bedside. Will reschedule MRI for tomorrow. Pt now resting comfortably.

## 2018-09-21 NOTE — PROGRESS NOTES
Pt. is verbally aggressive this morning. Pt. was not following directions, arguing with me about doing the NIHSS assessment, and grabbing my hand during the assessment, despite being told not to multipul times.

## 2018-09-21 NOTE — PROGRESS NOTES
S- about the same.  No other complaints.     O-   Allergies: No Known Allergies      Current Facility-Administered Medications:   •  methocarbamol (ROBAXIN) tablet 500 mg, 500 mg, Oral, 4X/DAY PRN, Daniel Hines M.D.  •  traZODone (DESYREL) tablet 50 mg, 50 mg, Oral, Nightly, Daniel Hines M.D., Stopped at 09/20/18 0400  •  oxyCODONE immediate-release (ROXICODONE) tablet 5 mg, 5 mg, Oral, Q4HRS PRN **OR** oxyCODONE immediate release (ROXICODONE) tablet 10 mg, 10 mg, Oral, Q4HRS PRN, Tommie Kidd M.D., 10 mg at 09/20/18 1826  •  polyethylene glycol/lytes (MIRALAX) PACKET 1 Packet, 1 Packet, Oral, DAILY, Tommie Kidd M.D.  •  calcium carbonate (TUMS) chewable tab 500 mg, 500 mg, Oral, Q4HRS PRN, Tommie Kidd M.D., 500 mg at 09/20/18 1826  •  LORazepam (ATIVAN) injection 1 mg, 1 mg, Intravenous, Once PRN, Tommie Kidd M.D.  •  levETIRAcetam (KEPPRA) 750 mg in  mL IVPB, 750 mg, Intravenous, Q12HRS, Reg Monique M.D., Stopped at 09/20/18 1815  •  labetalol (NORMODYNE,TRANDATE) injection 10 mg, 10 mg, Intravenous, Q4HRS PRN **OR** hydrALAZINE (APRESOLINE) injection 10 mg, 10 mg, Intravenous, Q2HRS PRN, Daniel Hines M.D.  •  atorvastatin (LIPITOR) tablet 80 mg, 80 mg, Oral, Q EVENING, Daniel Hinse M.D., 80 mg at 09/20/18 1826  •  aspirin EC (ECOTRIN) tablet 81 mg, 81 mg, Oral, DAILY **OR** aspirin (ASA) chewable tab 81 mg, 81 mg, Oral, DAILY **OR** aspirin (ASA) suppository 300 mg, 300 mg, Rectal, DAILY, Daniel Hines M.D., 300 mg at 09/20/18 0438  •  senna-docusate (PERICOLACE or SENOKOT S) 8.6-50 MG per tablet 2 Tab, 2 Tab, Oral, BID, 2 Tab at 09/20/18 1825 **AND** polyethylene glycol/lytes (MIRALAX) PACKET 1 Packet, 1 Packet, Oral, QDAY PRN **AND** magnesium hydroxide (MILK OF MAGNESIA) suspension 30 mL, 30 mL, Oral, QDAY PRN **AND** bisacodyl (DULCOLAX) suppository 10 mg, 10 mg, Rectal, QDAY PRN, Daniel Hines M.D.      PHYSICAL EXAM    Vitals:    09/20/18 0000  09/20/18 0800 09/20/18 1156 09/20/18 1549   BP: 111/59 100/60 123/91 124/79   Pulse: 84 71 94 90   Resp: (!) 22 19 18 18   Temp: 36.7 °C (98 °F) 36.8 °C (98.3 °F) 36.9 °C (98.4 °F) 36.8 °C (98.3 °F)   SpO2: 95% 94% 95%    Weight:       Height:         General: NCAT     Skin: No rashes observed head/neck or body      Head/Neck: NCAT no meningismus neg kernig neg brudzinski. No obvious mass or heard bruit. No tender arteries or lost pulses.      Eyes/Funduscopic: unable     Mental Status: Awake, alert, oriented x 3. Names/repeats/fluent/follows commands. No neglect/extinction. Attention and concentration, recent & remote memory, Fund of Knowledge wnl.     Cranial Nerves: left low face weak and dysarthria, other CN II-XII intact. PERRL 3mm.   No afferent pupillary defect. EOM full  , Visual Field full . left facial weakness. No nystagmus.                         Motor: bulk wnl  tone wnl. Left arm and leg drift arm is profound, otherwise intact/symmetric. no abn mvmts             Sensory:  Decreased on left otherwise intact/symmetric to light touch & sharp     Coordination: no dysmetria      DTR's: intact/sym. no clonus. Toes mute.     Gait/Station: n/a fall concern                 NIHSS score:  1a. LOC:   1b. LOC Questions:   1c. LOC Commands:   2. Best Gaze:   3. Visual Fields:   4. Facial Paresis: 1  5a. Motor arm left: 1  5b. Motor arm right:   6a. Motor leg left: 1  6b. Motor leg right:   7. Sensory:1  8. Best Language:   9. Limb Ataxia:   10. Dysarthria: 1  11. Extinction/Inattention:      Total Score: 5           Labs:  Recent Labs      09/19/18   1435  09/20/18   0303   WBC  8.7  7.3   RBC  5.07  4.87   HEMOGLOBIN  15.0  14.4   HEMATOCRIT  44.7  42.4   MCV  88.2  87.1   MCH  29.6  29.6   MCHC  33.6*  34.0   RDW  43.0  41.9   PLATELETCT  368  330   MPV  10.0  10.0     Recent Labs      09/19/18   1435  09/20/18   0303   SODIUM  138  139   POTASSIUM  4.3  4.2   CHLORIDE  104  107   CO2  25  24   GLUCOSE  85  94    BUN  15  16   CREATININE  1.01  0.95   CALCIUM  9.0  8.8     Recent Labs      09/19/18   1435   APTT  29.6   INR  1.11         Recent Labs      09/19/18   1435   CPKTOTAL  42   TROPONINI  <0.01     Recent Labs      09/20/18   0303   TRIGLYCERIDE  191*   HDL  26*   LDL  12     Recent Labs      09/19/18   1435  09/20/18   0303   SODIUM  138  139   POTASSIUM  4.3  4.2   CHLORIDE  104  107   CO2  25  24   GLUCOSE  85  94   BUN  15  16   CPKTOTAL  42   --      Recent Labs      09/19/18   1435  09/20/18   0303   SODIUM  138  139   POTASSIUM  4.3  4.2   CHLORIDE  104  107   CO2  25  24   BUN  15  16   CREATININE  1.01  0.95   CALCIUM  9.0  8.8     Recent Labs      09/19/18   1435   APTT  29.6   INR  1.11     No results found for this or any previous visit.           Imaging: neuroimaging reviewed and directly visualized by me  [id number] VIEW   Final Result      No metallic objects projecting over the abdomen.      CT-CTA NECK WITH & W/O-POST PROCESSING   Final Result      1.  CT angiogram of the neck within normal limits.   2.  Mediastinal adenopathy   3.  BILATERAL upper lobe mixed interstitial and airspace disease which is most likely pulmonary edema or atypical infection      CT-CTA HEAD WITH & W/O-POST PROCESS   Final Result      1.  Narrowing of the right M1 segment at the trifurcation consistent with stenosis. Thrombus not excluded.   2.  Remainder of the [place] arteries are opacified and patent.   3.  Posterior circulation arteries are opacified and patent.   4.  Areas of enhancement within the right caudate nucleus, right base and lingula, right insula, right temporal lobe, and lateral right occipital lobe consistent with ischemic change.   5.  Findings were discussed with Dr. Mcnamara and Dr Rell QUEZADA-CHEST-PORTABLE (1 VIEW)   Final Result      1.  Prominent cardiac silhouette and mediastinal contours are likely related to low lung volumes and portable AP technique.      CT-HEAD W/O   Final  Result   Addendum 1 of 1   There is subtle hemorrhage located centrally within the right basal    ganglia and insula. These findings are consistent with infarct.      Findings were discussed with ANDREW JURADO on 9/19/2018 3:34 PM.      Final      Right-sided mass effect with compression of the right lateral ventricle and 3.4 mm right to left midline shift.   Low-attenuation within the right caudate nucleus, basal ganglia, and right insula suggestive of edema possibly ischemic.   No acute intracranial hemorrhage.      CT-CEREBRAL PERFUSION ANALYSIS   Final Result      1.  CT perfusion examination over the limited section of brain reveals 0 mL of brain parenchyma has less than 30% of cerebral blood flow (CBF).      2.  Please note that the cerebral perfusion was performed on the limited brain tissue around the basal ganglia region. Infarct/ischemia outside the CT perfusion sections can be missed in this study.      MR-BRAIN-W/O    (Results Pending)   EC-ECHOCARDIOGRAM COMPLETE W/O CONT    (Results Pending)   This is abnormal routine video EEG recording in the awake and drowsy/sleep state(s).     This scalp EEG denotes                1. Moderate focal cortical dysfunction /irritibility over right--this patten could increase the risk of seizure - advise clinical correlation regarding management         If clinical suspicion of active seizure remains high.  Prolonged EEG   monitoring may be of help.     EEG 09/20/18 3:46 PM       Assessment/Plan:    suspect sz with post ictal deficit 2' to old stroke with mild old hem transformation    Decision NOT to give alteplase: recent stroke <3m  Contraindication for IVtPA: as above        Presumed mechanism by TOAST:  __Large Artery Atherosclerosis  __Small Vessel (Lacunar)  __Cardioembolic  _x_Other (Sickle Cell, Vasculitis, Hypercoagulable)  __Unknown        Recommendations:     keppra 750 q12h     sz precautions     mrb pending       Reg Monique M.D.  ,  Neurohospitalist & Stroke  Clinical Professor, [employer]  Diplomate, Neurology & Neuroimaging

## 2018-09-21 NOTE — PROGRESS NOTES
Assumed care of patient at 1900, received report from day shift RN. Pt is AOx4, no reports of pain at this time. Bed alarm on and call light within reach.

## 2018-09-21 NOTE — PROGRESS NOTES
[facility] Hospitalist Progress Note    Date of Service: 2018    Chief Complaint  53 y.o. male admitted 2018 with altered mental status and dysarthria    Interval Problem Update  There is concern for the possibility of seizures due to scarring from old stroke with possible prior hemorrhagic transformation (old)    Consultants/Specialty  Neurology    Disposition  Continued inpatient stay        Review of Systems   Constitutional: Negative.    HENT: Negative.    Eyes: Negative for blurred vision and double vision.   Respiratory: Negative.    Cardiovascular: Negative.    Gastrointestinal: Positive for heartburn. Negative for abdominal pain, nausea and vomiting.   Genitourinary: Negative.    Musculoskeletal: Negative.    Skin: Negative.    Neurological: Positive for focal weakness and headaches.   Endo/Heme/Allergies: Negative.    Psychiatric/Behavioral: Negative.       Physical Exam  Laboratory/Imaging   Hemodynamics  Temp (24hrs), Av.8 °C (98.3 °F), Min:36.7 °C (98 °F), Max:36.9 °C (98.4 °F)   Temperature: 36.8 °C (98.3 °F)  Pulse  Av.3  Min: 68  Max: 94 Heart Rate (Monitored): 70  Blood Pressure: 124/79, NIBP: 122/56      Respiratory      Respiration: 18, Pulse Oximetry: 95 %             Fluids    Intake/Output Summary (Last 24 hours) at 18 1743  Last data filed at 18 1400   Gross per 24 hour   Intake              950 ml   Output                0 ml   Net              950 ml       Nutrition  Orders Placed This Encounter   Procedures   • Diet Order Regular     Standing Status:   Standing     Number of Occurrences:   1     Order Specific Question:   Diet:     Answer:   Regular [1]     Order Specific Question:   Texture/Fiber modifications:     Answer:   Dysphagia 3(Mechanical Soft)specify fluid consistency(question 6) [3]     Order Specific Question:   Consistency/Fluid modifications:     Answer:   Thin Liquids [3]     Order Specific Question:   Miscellaneous modifications:     Answer:   SLP  - 1:1 Supervision by Nursing [21]     Physical Exam   Constitutional: He is oriented to person, place, and time. He appears well-developed and well-nourished. No distress.   Obese   HENT:   Head: Normocephalic and atraumatic.   Mouth/Throat: No oropharyngeal exudate.   Eyes: Pupils are equal, round, and reactive to light. EOM are normal. No scleral icterus.   Neck: Normal range of motion. Neck supple.   Cardiovascular: Normal rate and regular rhythm.    Pulmonary/Chest: Effort normal and breath sounds normal.   Abdominal: Soft. Bowel sounds are normal. He exhibits no distension. There is no tenderness. There is no rebound.   Protuberant   Musculoskeletal: Normal range of motion.   No obvious seizure activity presently   Lymphadenopathy:     He has no cervical adenopathy.   Neurological: He is alert and oriented to person, place, and time. No cranial nerve deficit.   Skin: Skin is warm and dry.   Psychiatric: He has a normal mood and affect.       Recent Labs      09/19/18   1435  09/20/18   0303   WBC  8.7  7.3   RBC  5.07  4.87   HEMOGLOBIN  15.0  14.4   HEMATOCRIT  44.7  42.4   MCV  88.2  87.1   MCH  29.6  29.6   MCHC  33.6*  34.0   RDW  43.0  41.9   PLATELETCT  368  330   MPV  10.0  10.0     Recent Labs      09/19/18   1435  09/20/18   0303   SODIUM  138  139   POTASSIUM  4.3  4.2   CHLORIDE  104  107   CO2  25  24   GLUCOSE  85  94   BUN  15  16   CREATININE  1.01  0.95   CALCIUM  9.0  8.8     Recent Labs      09/19/18   1435   APTT  29.6   INR  1.11         Recent Labs      09/20/18   0303   TRIGLYCERIDE  191*   HDL  26*   LDL  12          Assessment/Plan     * Acute ischemic stroke (HCC)- (present on admission)   Assessment & Plan    Left side weak    CT scan of the brain: ischemia with possible mild old hemorrhagic transformation.    CTA of the neck: negative.   altered mental status felt possibly seizure related(per neurolog)    Not TPA candidate due to recent stroke  low-dose aspirin/statin/permissive  hypertension   PT/OT/speech involved     Fall precautions.    MRI of the brain: pending    Echocardiogram: pending    EEG: rt cortical dysfxn noted(poss sz risk); keppra        Altered mental status- (present on admission)   Assessment & Plan    Secondary to above.  Fall and aspiration precautions.  EEG ordered.        Seizure (HCC)- (present on admission)   Assessment & Plan    secondary to old stroke with mild hemorrhagic transformation.  Neurology following.  Patient was given 3 g of Keppra IV x1 and then was placed on 750 mg IV twice daily.  EEG noted as above  Cont keppra          Quality-Core Measures   DVT prophylaxis - mechanical:  SCDs

## 2018-09-21 NOTE — THERAPY
Speech Language Therapy dysphagia treatment completed.   Functional Status:  The patient was seen for follow up this date.  The patient was awake, alert and oriented to self, location, geeta and year and continues to demonstrate poor insight into current medical and deficits resulting from CVA.  The patient perseverating on the need to eat and get out of here to go see his dying father. Patient was seen at lunch with son and brother present, but standing outside of the room to minimize distractions.  Patient needed moderate verbal cues to take small bites and sips and slow down.  He was instructed to use a double swallow which he was able to do with moderate verbal cueing ~50% of the time.  He had wet voice X2 on solids and coughing X2 on thin liquids--in all instances, he was trying to talk or yell at his son during all of these instances.  When he was able to sustain focus to the tasks of eating, he had no overt S/Sx of aspiration noted.  At this time, would recommend continuation of dysphagia III diet with thin liquids with direct 1:1 supervision for all PO intake and swallow strategies as listed below.   Patient would like to DC home with his brother, and brother states that either he or a caregiver can provide 24/7 care.  SLP will follow for dysphagia therapy and cognitive evaluation as appropriate.  Please hold PO with any difficulty.      Recommendations - Diet:  Dysphagia III, Thin Liquid                          Strategies: Direct 1:1 supervision during meals and Head of Bed at 90 Degrees: TV off for all PO intake: double swallow                          Medication Administration: Whole with Liquid Wash     Plan of Care: Will benefit from Speech Therapy 5 times per week  Post-Acute Therapy: Discharge to a transitional care facility for continued skilled therapy services vs home with 24 hour supervision and home health therapy.

## 2018-09-21 NOTE — PROGRESS NOTES
Second attempt of MRI.   Per RN patient received Ativan before coming down.   Patient unable to cooperate with test (moving, confused, needed to urinate, etc)  More than 30 minutes spent with patient attempting to start test.   Patient sent back to floor, RN aware.

## 2018-09-21 NOTE — PROGRESS NOTES
S- SAME.  No other complaints.     O-   Allergies: No Known Allergies      Current Facility-Administered Medications:   •  methocarbamol (ROBAXIN) tablet 500 mg, 500 mg, Oral, 4X/DAY PRN, Daniel Hines M.D.  •  traZODone (DESYREL) tablet 50 mg, 50 mg, Oral, Nightly, Daniel Hines M.D., 50 mg at 09/20/18 2018  •  oxyCODONE immediate-release (ROXICODONE) tablet 5 mg, 5 mg, Oral, Q4HRS PRN **OR** oxyCODONE immediate release (ROXICODONE) tablet 10 mg, 10 mg, Oral, Q4HRS PRN, Tommie Kidd M.D., 10 mg at 09/20/18 1826  •  polyethylene glycol/lytes (MIRALAX) PACKET 1 Packet, 1 Packet, Oral, DAILY, Tommie Kidd M.D.  •  calcium carbonate (TUMS) chewable tab 500 mg, 500 mg, Oral, Q4HRS PRN, Tommie Kidd M.D., 500 mg at 09/20/18 1941  •  levETIRAcetam (KEPPRA) 750 mg in  mL IVPB, 750 mg, Intravenous, Q12HRS, Reg Monique M.D., Stopped at 09/21/18 0553  •  labetalol (NORMODYNE,TRANDATE) injection 10 mg, 10 mg, Intravenous, Q4HRS PRN **OR** hydrALAZINE (APRESOLINE) injection 10 mg, 10 mg, Intravenous, Q2HRS PRN, Daniel Hines M.D.  •  atorvastatin (LIPITOR) tablet 80 mg, 80 mg, Oral, Q EVENING, Daniel Hines M.D., 80 mg at 09/20/18 1826  •  aspirin EC (ECOTRIN) tablet 81 mg, 81 mg, Oral, DAILY **OR** aspirin (ASA) chewable tab 81 mg, 81 mg, Oral, DAILY, 81 mg at 09/21/18 0537 **OR** aspirin (ASA) suppository 300 mg, 300 mg, Rectal, DAILY, Daniel Hines M.D., 300 mg at 09/20/18 0438  •  senna-docusate (PERICOLACE or SENOKOT S) 8.6-50 MG per tablet 2 Tab, 2 Tab, Oral, BID, 2 Tab at 09/21/18 0537 **AND** polyethylene glycol/lytes (MIRALAX) PACKET 1 Packet, 1 Packet, Oral, QDAY PRN **AND** magnesium hydroxide (MILK OF MAGNESIA) suspension 30 mL, 30 mL, Oral, QDAY PRN **AND** bisacodyl (DULCOLAX) suppository 10 mg, 10 mg, Rectal, QDAY PRN, Daniel Hines M.D.      PHYSICAL EXAM    Vitals:    09/20/18 1156 09/20/18 1549 09/20/18 1900 09/21/18 0400   BP: 123/91 124/79 107/76 126/70    Pulse: 94 90 78 98   Resp: 18 18 18 16   Temp: 36.9 °C (98.4 °F) 36.8 °C (98.3 °F) 36.8 °C (98.2 °F) 37.6 °C (99.6 °F)   SpO2: 95%  95% 94%   Weight:       Height:           General: NCAT     Skin: No rashes observed head/neck or body      Head/Neck: NCAT no meningismus neg kernig neg brudzinski. No obvious mass or heard bruit. No tender arteries or lost pulses.      Eyes/Funduscopic: unable     Mental Status: Awake, alert, oriented x 3. Names/repeats/fluent/follows commands. No neglect/extinction. Attention and concentration, recent & remote memory, Fund of Knowledge wnl.     Cranial Nerves: left low face weak and dysarthria, other CN II-XII intact. PERRL 3mm.   No afferent pupillary defect. EOM full  , Visual Field full . left facial weakness. No nystagmus.                Motor: bulk wnl  tone wnl. Left arm and leg drift arm is profound, otherwise intact/symmetric. no abn mvmts             Sensory:  Decreased on left otherwise intact/symmetric to light touch & sharp     Coordination: no dysmetria      DTR's: intact/sym. no clonus. Toes mute.     Gait/Station: n/a fall concern        NIHSS score:  1a. LOC:   1b. LOC Questions:   1c. LOC Commands:   2. Best Gaze:   3. Visual Fields:   4. Facial Paresis: 1  5a. Motor arm left: 1  5b. Motor arm right:   6a. Motor leg left: 1  6b. Motor leg right:   7. Sensory:1  8. Best Language:   9. Limb Ataxia:   10. Dysarthria: 1  11. Extinction/Inattention:      Total Score: 5         Labs:  Recent Labs      09/19/18   1435  09/20/18   0303   WBC  8.7  7.3   RBC  5.07  4.87   HEMOGLOBIN  15.0  14.4   HEMATOCRIT  44.7  42.4   MCV  88.2  87.1   MCH  29.6  29.6   MCHC  33.6*  34.0   RDW  43.0  41.9   PLATELETCT  368  330   MPV  10.0  10.0     Recent Labs      09/19/18   1435  09/20/18   0303   SODIUM  138  139   POTASSIUM  4.3  4.2   CHLORIDE  104  107   CO2  25  24   GLUCOSE  85  94   BUN  15  16   CREATININE  1.01  0.95   CALCIUM  9.0  8.8     Recent Labs      09/19/18   1435    APTT  29.6   INR  1.11         Recent Labs      09/19/18   1435   CPKTOTAL  42   TROPONINI  <0.01     Recent Labs      09/20/18   0303   TRIGLYCERIDE  191*   HDL  26*   LDL  12     Recent Labs      09/19/18   1435  09/20/18   0303   SODIUM  138  139   POTASSIUM  4.3  4.2   CHLORIDE  104  107   CO2  25  24   GLUCOSE  85  94   BUN  15  16   CPKTOTAL  42   --      Recent Labs      09/19/18   1435  09/20/18   0303   SODIUM  138  139   POTASSIUM  4.3  4.2   CHLORIDE  104  107   CO2  25  24   BUN  15  16   CREATININE  1.01  0.95   CALCIUM  9.0  8.8     Recent Labs      09/19/18   1435   APTT  29.6   INR  1.11     No results found for this or any previous visit.           Imaging: neuroimaging reviewed and directly visualized by me  [id number] VIEW   Final Result      No metallic objects projecting over the abdomen.      CT-CTA NECK WITH & W/O-POST PROCESSING   Final Result      1.  CT angiogram of the neck within normal limits.   2.  Mediastinal adenopathy   3.  BILATERAL upper lobe mixed interstitial and airspace disease which is most likely pulmonary edema or atypical infection      CT-CTA HEAD WITH & W/O-POST PROCESS   Final Result      1.  Narrowing of the right M1 segment at the trifurcation consistent with stenosis. Thrombus not excluded.   2.  Remainder of the [place] arteries are opacified and patent.   3.  Posterior circulation arteries are opacified and patent.   4.  Areas of enhancement within the right caudate nucleus, right base and lingula, right insula, right temporal lobe, and lateral right occipital lobe consistent with ischemic change.   5.  Findings were discussed with Dr. Mcnamara and Dr Rell QUEZADA-CHEST-PORTABLE (1 VIEW)   Final Result      1.  Prominent cardiac silhouette and mediastinal contours are likely related to low lung volumes and portable AP technique.      CT-HEAD W/O   Final Result   Addendum 1 of 1   There is subtle hemorrhage located centrally within the right basal     ganglia and insula. These findings are consistent with infarct.      Findings were discussed with ANDREW JURADO on 9/19/2018 3:34 PM.      Final      Right-sided mass effect with compression of the right lateral ventricle and 3.4 mm right to left midline shift.   Low-attenuation within the right caudate nucleus, basal ganglia, and right insula suggestive of edema possibly ischemic.   No acute intracranial hemorrhage.      CT-CEREBRAL PERFUSION ANALYSIS   Final Result      1.  CT perfusion examination over the limited section of brain reveals 0 mL of brain parenchyma has less than 30% of cerebral blood flow (CBF).      2.  Please note that the cerebral perfusion was performed on the limited brain tissue around the basal ganglia region. Infarct/ischemia outside the CT perfusion sections can be missed in this study.      MR-BRAIN-W/O    (Results Pending)   EC-ECHOCARDIOGRAM COMPLETE W/O CONT    (Results Pending)       Assessment/Plan:    suspect sz with post ictal deficit 2' to old stroke with mild old hem transformation, EEG ABN SUGGESTS RIGHT HEMISPHERIC SEIZURE RISK W/HX CONSISTENT WITH SAME. TRIGGERED BY TRAZADONE LIKELY.    R MCA ISCHEMIC STROKE WITH HEM TRANS, APPROX 2WKS OLD, POSSIBLY CARDIOEMBOLIC    keppra 750 q12h    AVOID TRAZADONE, D2 ANTAGONISTS, ATIVAN LOW DOSE FOR SLEEP OR LONG ACTING BENZO ONLY     sz precautions    Aspirin 81mg     mrb not tolerated, will forego as new stroke less likely with hx of sz symptoms, if stroke would utilize antiplatelet low dose anyway with hx of hem trans of isch stroke recent and no known reason for AC presently. NO LVO to suggest large cardioembolic clot. On 9/23 should be safe to start anticoagulation if indicated by card MD, DC aspirin when therapeutic anticoagulation.    TTE with bubble, telemetry.  Card MD consult for need for long term ICM or MARIA M. May be anticoagulation candidate in coming days if cardiac source found. If find cardioembolic etiology, would hope to  obtain MRB prior to starting anticoagulation if less than 3-5 days from new symptom onset.    Stroke labs include lipids/TFT/a1c/trop/UA    STATIN FULL DOSE CRESTOR OR LIPITOR    ACEI/ARB, HCTZ, AND/OR CCB FOR JNC GOALS     BG MONITOR/CNTRL, DIET & LIFESTYLE/AVOID SMOKING DISCUSSED. Discussed stroke risks & mechanism of stroke injury, all questions answered.    Discussed seizure hygiene, triggers, and AED compliance/side effects    Report to [place] for driving restriction; advised no activities where LOC a danger until neuro MD clears as otpt      No further neuro workup as inpatient if aforementioned studies WNL & maintains neuro baseline.  Neuro sign off, reconsult PRN.  F/u otpt Neuro MD in coming months.         Reg Monique M.D.  , Neurohospitalist & Stroke  Clinical Professor, [facility] [employer]  Diplomate, Neurology & Neuroimaging

## 2018-09-21 NOTE — PROGRESS NOTES
Pt very anxious this evening and wanting to be discharge to visit his father in hospice care before he passes away. Pt asking to go outside for fresh air. Pt brought outside via wheelchair with this nurse. Reassurance provided to patient that we must complete all tests before safely discharging patient. Pt expressed some relief of anxiety.

## 2018-09-21 NOTE — CARE PLAN
Problem: Safety  Goal: Will remain free from falls    Intervention: Implement fall precautions  Fall precautions in place, bed alarm on. Call light and personal belongings within reach. Hourly rounding in place.       Problem: Psychosocial Needs:  Goal: Level of anxiety will decrease    Intervention: Identify and develop with patient strategies to cope with anxiety triggers  Patient given time to verbalize concerns and questions regarding POC. Pt also mobilized and repositioned to help decrease anxiety.

## 2018-09-21 NOTE — PROGRESS NOTES
Monitor Summary: SR 76-87, ND .18, QRS .08, QT .34 with rare PVC & 5 beats BBB per strip from monitor room

## 2018-09-22 ENCOUNTER — HOSPITAL ENCOUNTER (INPATIENT)
Facility: REHABILITATION | Age: 53
End: 2018-09-22
Attending: PHYSICAL MEDICINE & REHABILITATION | Admitting: PHYSICAL MEDICINE & REHABILITATION
Payer: COMMERCIAL

## 2018-09-22 ENCOUNTER — APPOINTMENT (OUTPATIENT)
Dept: CARDIOLOGY | Facility: MEDICAL CENTER | Age: 53
DRG: 064 | End: 2018-09-22
Attending: FAMILY MEDICINE
Payer: COMMERCIAL

## 2018-09-22 LAB
ANION GAP SERPL CALC-SCNC: 9 MMOL/L (ref 0–11.9)
BASOPHILS # BLD AUTO: 1.7 % (ref 0–1.8)
BASOPHILS # BLD: 0.12 K/UL (ref 0–0.12)
BUN SERPL-MCNC: 14 MG/DL (ref 8–22)
CALCIUM SERPL-MCNC: 9.5 MG/DL (ref 8.5–10.5)
CHLORIDE SERPL-SCNC: 103 MMOL/L (ref 96–112)
CO2 SERPL-SCNC: 23 MMOL/L (ref 20–33)
CREAT SERPL-MCNC: 0.85 MG/DL (ref 0.5–1.4)
EOSINOPHIL # BLD AUTO: 0.42 K/UL (ref 0–0.51)
EOSINOPHIL NFR BLD: 6.1 % (ref 0–6.9)
ERYTHROCYTE [DISTWIDTH] IN BLOOD BY AUTOMATED COUNT: 41.8 FL (ref 35.9–50)
GLUCOSE SERPL-MCNC: 104 MG/DL (ref 65–99)
HCT VFR BLD AUTO: 43.4 % (ref 42–52)
HGB BLD-MCNC: 14.2 G/DL (ref 14–18)
IMM GRANULOCYTES # BLD AUTO: 0.04 K/UL (ref 0–0.11)
IMM GRANULOCYTES NFR BLD AUTO: 0.6 % (ref 0–0.9)
LV EJECT FRACT  99904: 65
LYMPHOCYTES # BLD AUTO: 0.77 K/UL (ref 1–4.8)
LYMPHOCYTES NFR BLD: 11.1 % (ref 22–41)
MCH RBC QN AUTO: 28.4 PG (ref 27–33)
MCHC RBC AUTO-ENTMCNC: 32.7 G/DL (ref 33.7–35.3)
MCV RBC AUTO: 86.8 FL (ref 81.4–97.8)
MONOCYTES # BLD AUTO: 0.93 K/UL (ref 0–0.85)
MONOCYTES NFR BLD AUTO: 13.4 % (ref 0–13.4)
NEUTROPHILS # BLD AUTO: 4.64 K/UL (ref 1.82–7.42)
NEUTROPHILS NFR BLD: 67.1 % (ref 44–72)
NRBC # BLD AUTO: 0 K/UL
NRBC BLD-RTO: 0 /100 WBC
PLATELET # BLD AUTO: 366 K/UL (ref 164–446)
PMV BLD AUTO: 9.8 FL (ref 9–12.9)
POTASSIUM SERPL-SCNC: 3.9 MMOL/L (ref 3.6–5.5)
RBC # BLD AUTO: 5 M/UL (ref 4.7–6.1)
SODIUM SERPL-SCNC: 135 MMOL/L (ref 135–145)
WBC # BLD AUTO: 6.9 K/UL (ref 4.8–10.8)

## 2018-09-22 PROCEDURE — 36415 COLL VENOUS BLD VENIPUNCTURE: CPT

## 2018-09-22 PROCEDURE — 700102 HCHG RX REV CODE 250 W/ 637 OVERRIDE(OP): Performed by: HOSPITALIST

## 2018-09-22 PROCEDURE — A9270 NON-COVERED ITEM OR SERVICE: HCPCS | Performed by: SPECIALIST

## 2018-09-22 PROCEDURE — 93306 TTE W/DOPPLER COMPLETE: CPT | Mod: 26 | Performed by: INTERNAL MEDICINE

## 2018-09-22 PROCEDURE — 93308 TTE F-UP OR LMTD: CPT

## 2018-09-22 PROCEDURE — A9270 NON-COVERED ITEM OR SERVICE: HCPCS | Performed by: FAMILY MEDICINE

## 2018-09-22 PROCEDURE — 770020 HCHG ROOM/CARE - TELE (206)

## 2018-09-22 PROCEDURE — 700102 HCHG RX REV CODE 250 W/ 637 OVERRIDE(OP): Performed by: FAMILY MEDICINE

## 2018-09-22 PROCEDURE — 97116 GAIT TRAINING THERAPY: CPT

## 2018-09-22 PROCEDURE — 80048 BASIC METABOLIC PNL TOTAL CA: CPT

## 2018-09-22 PROCEDURE — 97112 NEUROMUSCULAR REEDUCATION: CPT

## 2018-09-22 PROCEDURE — 99232 SBSQ HOSP IP/OBS MODERATE 35: CPT | Performed by: SPECIALIST

## 2018-09-22 PROCEDURE — 93306 TTE W/DOPPLER COMPLETE: CPT

## 2018-09-22 PROCEDURE — 700102 HCHG RX REV CODE 250 W/ 637 OVERRIDE(OP): Performed by: SPECIALIST

## 2018-09-22 PROCEDURE — 700117 HCHG RX CONTRAST REV CODE 255

## 2018-09-22 PROCEDURE — 85025 COMPLETE CBC W/AUTO DIFF WBC: CPT

## 2018-09-22 PROCEDURE — A9270 NON-COVERED ITEM OR SERVICE: HCPCS | Performed by: HOSPITALIST

## 2018-09-22 PROCEDURE — 99358 PROLONG SERVICE W/O CONTACT: CPT | Performed by: PHYSICAL MEDICINE & REHABILITATION

## 2018-09-22 RX ORDER — FAMOTIDINE 20 MG/1
20 TABLET, FILM COATED ORAL 2 TIMES DAILY PRN
Status: DISCONTINUED | OUTPATIENT
Start: 2018-09-22 | End: 2018-09-23 | Stop reason: HOSPADM

## 2018-09-22 RX ORDER — ALPRAZOLAM 0.5 MG/1
0.5 TABLET ORAL ONCE
Status: COMPLETED | OUTPATIENT
Start: 2018-09-23 | End: 2018-09-23

## 2018-09-22 RX ADMIN — ATORVASTATIN CALCIUM 80 MG: 80 TABLET, FILM COATED ORAL at 17:34

## 2018-09-22 RX ADMIN — FAMOTIDINE 20 MG: 20 TABLET, FILM COATED ORAL at 22:22

## 2018-09-22 RX ADMIN — ACETAMINOPHEN 650 MG: 325 TABLET, FILM COATED ORAL at 12:38

## 2018-09-22 RX ADMIN — STANDARDIZED SENNA CONCENTRATE AND DOCUSATE SODIUM 2 TABLET: 8.6; 5 TABLET ORAL at 06:22

## 2018-09-22 RX ADMIN — ASPIRIN 81 MG: 81 TABLET, COATED ORAL at 06:22

## 2018-09-22 RX ADMIN — LEVETIRACETAM 750 MG: 250 TABLET ORAL at 06:22

## 2018-09-22 RX ADMIN — LEVETIRACETAM 750 MG: 250 TABLET ORAL at 17:34

## 2018-09-22 RX ADMIN — ANTACID TABLETS 1000 MG: 500 TABLET, CHEWABLE ORAL at 15:17

## 2018-09-22 RX ADMIN — OXYCODONE HYDROCHLORIDE 5 MG: 5 TABLET ORAL at 16:12

## 2018-09-22 RX ADMIN — STANDARDIZED SENNA CONCENTRATE AND DOCUSATE SODIUM 2 TABLET: 8.6; 5 TABLET ORAL at 17:34

## 2018-09-22 RX ADMIN — ALPRAZOLAM 0.5 MG: 0.5 TABLET ORAL at 21:02

## 2018-09-22 RX ADMIN — OXYCODONE HYDROCHLORIDE 5 MG: 5 TABLET ORAL at 21:01

## 2018-09-22 ASSESSMENT — ENCOUNTER SYMPTOMS
EYES NEGATIVE: 1
PSYCHIATRIC NEGATIVE: 1
GASTROINTESTINAL NEGATIVE: 1
CONSTITUTIONAL NEGATIVE: 1
SEIZURES: 0
MUSCULOSKELETAL NEGATIVE: 1
RESPIRATORY NEGATIVE: 1
FOCAL WEAKNESS: 1
CARDIOVASCULAR NEGATIVE: 1
LOSS OF CONSCIOUSNESS: 0

## 2018-09-22 ASSESSMENT — PATIENT HEALTH QUESTIONNAIRE - PHQ9
2. FEELING DOWN, DEPRESSED, IRRITABLE, OR HOPELESS: NOT AT ALL
SUM OF ALL RESPONSES TO PHQ9 QUESTIONS 1 AND 2: 0
1. LITTLE INTEREST OR PLEASURE IN DOING THINGS: NOT AT ALL

## 2018-09-22 ASSESSMENT — GAIT ASSESSMENTS
ASSISTIVE DEVICE: FRONT WHEEL WALKER
GAIT LEVEL OF ASSIST: CONTACT GUARD ASSIST
DISTANCE (FEET): 350

## 2018-09-22 ASSESSMENT — PAIN SCALES - GENERAL: PAINLEVEL_OUTOF10: 10

## 2018-09-22 NOTE — PROGRESS NOTES
I reviewed the notes from the patient's medical admission at acute rehabilitation of 9/14-9/19, discharge summary from 9/19, the notes from the patient's admission at the acute hospital from 9/19/2018-9/22/2018.  I reviewed the therapy notes as well. The patient meets criteria for acute rehabilitation at this point with dysphasia, significant aspiration risk working with speech therapy from the note from 9/21/2018.  The occupational therapy note from 9/20/2018 with min assist for ambulation and transfers with a front wheel walker , Min assist required for ambulation with physical therapy on 9/20/2018.  The patient had an acute stroke with left hemiplegia, new onset seizures thought to be secondary to stroke focus on Keppra.  I recommend for the patient to be seen by consult attending In-house on 9/24/2018 To discuss readmission to acute rehabilitation [facility].  Alternatively the patient may be seen at acute rehabilitation via transfer to [place] if that is more amenable to the patient and family.    Prolonged non-face-to-face time was spent on 9/22/2018 from 1210 to 1251 by this reviewer.  This time included medical chart review, medication review, and decision making for the appropriateness of transfer to inpatient rehabilitation.  In addition to the above, time was spent coordinating care with case management as well as transitional care coordination team in the acceptance and transfer of the patient to the inpatient rehabilitation facility setting.  I discussed the case with transitional care coordinator Jerald Abraham as well as hospitalist Tommie Kidd.

## 2018-09-22 NOTE — DISCHARGE PLANNING
PMR order receivd from Dr. Kidd.  ANT is shown for her medical provider.  Filiberto was sent acute from [facility].  Slurred speech and altered mental status.  W/U is in progress.  This referral will not be forwarded to Physiatry @ this time.  TCC remains following.  I do appreciate the referral.

## 2018-09-22 NOTE — DISCHARGE PLANNING
Anticipated Discharge Disposition: TBD    Action: LSW spoke to patient and son Ignacio at bedside regarding discharge.  Patient and family are refusing Acute Rehab at this time.  LSW provided education on benefits of Acute Rehab and risks associated with discharging AMA.  Patient's son Ignacio politely declined and stated that they have an appointment scheduled with patient's PCP for 9/24/18 and will discuss options at that time.    Barriers to Discharge: Medical clearance    Plan: LSW to continue to assist with d/c as needed.

## 2018-09-22 NOTE — DISCHARGE PLANNING
Please review progress note from Dr. Kaminski regarding current Physiatry recommendations.  Dr. Kaminski has requested a referral to [facility].  MIGUEL Hernandez is aware.

## 2018-09-22 NOTE — PROGRESS NOTES
"Pt being uncooperative with this nurse, continuously yelling into halls at passerbys and getting out of bed without assistance. Pt states he \"wants to tell all the nurses how beautiful they are.\" Pt was instructed to not harass nurses while they are working and to not get up without assistance. Security was called to bedside as patient was argumentative and refused to listen to instruction.   "

## 2018-09-22 NOTE — DISCHARGE PLANNING
Anticipated Discharge Disposition: Home with HH/outpatient therapy    Action: LSW met with patient and family at bedside for FWW CHOICE.  CHOICE 1. [facility]. Patient has an appointment with his PCP for Monday, 9/24/18, to assist with HH [place]    Barriers to Discharge: Medical clearance/FWW delivery    Plan: LSW to continue to assist with d/c as needed.

## 2018-09-22 NOTE — PROGRESS NOTES
Assumed care of patient at 1900, received report from day shift RN. Pt is AOx4, restless at this time.

## 2018-09-22 NOTE — PROGRESS NOTES
Patient is refusing tele box. Family has been threatening to take patient  AMA all morning despite talks with physician and my attempts at education.

## 2018-09-22 NOTE — THERAPY
"Physical Therapy Treatment completed.   Bed Mobility:  Supine to Sit: Supervised  Transfers: Sit to Stand: Stand by Assist  Gait: Level Of Assist: Contact Guard Assist with Front-Wheel Walker       Plan of Care: Will benefit from Physical Therapy 5 times per week  Discharge Recommendations: Equipment: Front-Wheel Walker. Pt would benefit from post acute therapy. Family is wanting to take pt home to get outpt therapy. Pt is functioning @ level that requires 24/7 supervision which will be provided by the family. Recommend FWW for travels home. Pt does listen to his son who has been assisting pt in the room.   See \"Rehab Therapy-Acute\" Patient Summary Report for complete documentation.       "

## 2018-09-22 NOTE — PROGRESS NOTES
0400 Nurse responded to bed alarm to find patient sitting at edge of bed stating he needed to use the bathroom. Nurse instructed patient to wait while he got the front wheel walker for the patient. Pt stood up while nurse was bringing the walker to the patient and pt lost his balance, falling toward his left side and landing on his buttocks. Patient was assisted to standing and brought to the the bathroom. Patient assessed for injuries and no visible injuries were noted. Pt complained of pain at site of buttocks, heat packs were applied. VSS. Charge RN, Hospitalist and pharmacist notified.

## 2018-09-22 NOTE — PROGRESS NOTES
[facility]    Date of Service: 2018    Chief Complaint  53 y.o. male admitted 2018 with altered mental status, dysarthria, left-sided weakness (though patient has had prior stroke); there was concern for the possibility of seizures perhaps induced by old stroke focus.    Interval Problem Update  Neurology has recommended waiting until  to initiate anticoagulation, if indicated.  Cardiology consultation is recommended and physical medicine and rehab consultation will be pursued.    Consultants/Specialty  Neurology    Disposition  Pending, suspect acute inpatient rehab        Review of Systems   Constitutional: Negative.    HENT: Negative.    Eyes: Negative for blurred vision and double vision.   Respiratory: Negative.    Cardiovascular: Negative.    Gastrointestinal: Negative.    Genitourinary: Negative.    Musculoskeletal: Negative.    Skin: Negative.    Neurological: Positive for focal weakness and headaches.        More awake, less confused  Less headache  No overt seizures   Endo/Heme/Allergies: Negative.    Psychiatric/Behavioral: Negative.       Physical Exam  Laboratory/Imaging   Hemodynamics  Temp (24hrs), Av.9 °C (98.5 °F), Min:36.7 °C (98.1 °F), Max:37.6 °C (99.6 °F)   Temperature: 36.8 °C (98.2 °F)  Pulse  Av.5  Min: 68  Max: 100    Blood Pressure: 144/81      Respiratory      Respiration: 16, Pulse Oximetry: 95 %             Fluids    Intake/Output Summary (Last 24 hours) at 18 1810  Last data filed at 18 0730   Gross per 24 hour   Intake                4 ml   Output                0 ml   Net                4 ml       Nutrition  Orders Placed This Encounter   Procedures   • Diet Order Regular     Standing Status:   Standing     Number of Occurrences:   1     Order Specific Question:   Diet:     Answer:   Regular [1]     Order Specific Question:   Texture/Fiber modifications:     Answer:   Dysphagia 3(Mechanical Soft)specify fluid  consistency(question 6) [3]     Order Specific Question:   Consistency/Fluid modifications:     Answer:   Thin Liquids [3]     Order Specific Question:   Miscellaneous modifications:     Answer:   SLP - 1:1 Supervision by Nursing [21]     Physical Exam   Constitutional:   Obese   HENT:   Head: Normocephalic and atraumatic.   Eyes: Pupils are equal, round, and reactive to light. No scleral icterus.   Neck: Normal range of motion. Neck supple.   Cardiovascular: Normal rate and regular rhythm.    Pulmonary/Chest: Effort normal and breath sounds normal.   Abdominal: Soft. Bowel sounds are normal. He exhibits no distension. There is no tenderness. There is no rebound.   Protuberant   Musculoskeletal: He exhibits no edema or tenderness.   Neurological: He is alert.   Left-sided weakness, dysarthria   Skin: Skin is dry.   Psychiatric: He has a normal mood and affect.       Recent Labs      09/19/18   1435  09/20/18   0303   WBC  8.7  7.3   RBC  5.07  4.87   HEMOGLOBIN  15.0  14.4   HEMATOCRIT  44.7  42.4   MCV  88.2  87.1   MCH  29.6  29.6   MCHC  33.6*  34.0   RDW  43.0  41.9   PLATELETCT  368  330   MPV  10.0  10.0     Recent Labs      09/19/18   1435  09/20/18   0303   SODIUM  138  139   POTASSIUM  4.3  4.2   CHLORIDE  104  107   CO2  25  24   GLUCOSE  85  94   BUN  15  16   CREATININE  1.01  0.95   CALCIUM  9.0  8.8     Recent Labs      09/19/18   1435   APTT  29.6   INR  1.11         Recent Labs      09/20/18   0303   TRIGLYCERIDE  191*   HDL  26*   LDL  12          Assessment/Plan     * Acute ischemic stroke (HCC)- (present on admission)   Assessment & Plan    Left side weak    CT scan of the brain: ischemia with possible mild old hemorrhagic transformation.    CTA of the neck: negative.   altered mental status felt possibly seizure related(per neurology); improved   Not TPA candidate due to recent stroke  low-dose aspirin/statin/permissive hypertension   PT/OT/speech involved     Fall precautions.    MRI of the  brain: pt could not remain still despite ativan; son lizet dose of trazadone received last night  Echocardiogram: pending    EEG: rt cortical dysfxn noted(poss sz risk); keppra        Altered mental status- (present on admission)   Assessment & Plan    Secondary to above.  Fall and aspiration precautions.  EEG ordered.  improved        Seizure (HCC)- (present on admission)   Assessment & Plan    Proposed to be secondary to old stroke with mild hemorrhagic transformation.  Neurology following.   EEG noted as above  Cont keppra          Quality-Core Measures   DVT prophylaxis - mechanical:  SCDs

## 2018-09-22 NOTE — PROGRESS NOTES
"Pt complaining of restlessness and anxiety. Asking to be \"knocked out.\" Pt very tearful about being able to see in dad in hospice care before he passes away. Hospitalist notified and obtained order for xanax.  "

## 2018-09-22 NOTE — PROGRESS NOTES
[facility] Progress Note    Date of Service: 2018    Chief Complaint  53 y.o. male admitted 2018 with (from acute inpatient rehab) altered mental status, dysarthria, left-sided weakness and concerned that seizures were being induced by old stroke focus.    Interval Problem Update  Status stabilizing  2D echocardiogram performed this morning, results pending  No apparent seizure activity  Family at bedside    Consultants/Specialty  Neurology/physical medicine and rehab    Disposition  Pending        Review of Systems   Constitutional: Negative.    HENT: Negative.    Eyes: Negative.    Respiratory: Negative.    Cardiovascular: Negative.    Gastrointestinal: Negative.    Genitourinary: Negative.    Musculoskeletal: Negative.    Skin: Negative.    Neurological: Positive for focal weakness. Negative for seizures and loss of consciousness.   Endo/Heme/Allergies: Negative.    Psychiatric/Behavioral: Negative.       Physical Exam  Laboratory/Imaging   Hemodynamics  Temp (24hrs), Av.7 °C (98.1 °F), Min:35.9 °C (96.7 °F), Max:37.1 °C (98.7 °F)   Temperature: 37.1 °C (98.7 °F)  Pulse  Av.3  Min: 68  Max: 106    Blood Pressure: 127/81      Respiratory      Respiration: 16, Pulse Oximetry: 97 %             Fluids  No intake or output data in the 24 hours ending 18 1149    Nutrition  Orders Placed This Encounter   Procedures   • Diet Order Regular     Standing Status:   Standing     Number of Occurrences:   1     Order Specific Question:   Diet:     Answer:   Regular [1]     Order Specific Question:   Texture/Fiber modifications:     Answer:   Dysphagia 3(Mechanical Soft)specify fluid consistency(question 6) [3]     Order Specific Question:   Consistency/Fluid modifications:     Answer:   Thin Liquids [3]     Order Specific Question:   Miscellaneous modifications:     Answer:   SLP - 1:1 Supervision by Nursing [21]     Physical Exam   Constitutional: He appears well-developed and well-nourished.    HENT:   Head: Normocephalic and atraumatic.   Mouth/Throat: No oropharyngeal exudate.   Eyes: Pupils are equal, round, and reactive to light. EOM are normal. No scleral icterus.   Neck: Normal range of motion. Neck supple.   Cardiovascular: Normal rate and regular rhythm.    Pulmonary/Chest: Effort normal and breath sounds normal.   Abdominal: Soft. Bowel sounds are normal. He exhibits no distension. There is no tenderness.   Protuberant   Musculoskeletal: He exhibits no edema or tenderness.   Lymphadenopathy:     He has no cervical adenopathy.   Neurological: He is alert. A cranial nerve deficit is present.   Left-sided weakness, dysarthria, not overtly confused   Skin: Skin is warm and dry.       Recent Labs      09/19/18   1435  09/20/18   0303  09/22/18   0833   WBC  8.7  7.3  6.9   RBC  5.07  4.87  5.00   HEMOGLOBIN  15.0  14.4  14.2   HEMATOCRIT  44.7  42.4  43.4   MCV  88.2  87.1  86.8   MCH  29.6  29.6  28.4   MCHC  33.6*  34.0  32.7*   RDW  43.0  41.9  41.8   PLATELETCT  368  330  366   MPV  10.0  10.0  9.8     Recent Labs      09/19/18   1435  09/20/18   0303  09/22/18   0833   SODIUM  138  139  135   POTASSIUM  4.3  4.2  3.9   CHLORIDE  104  107  103   CO2  25  24  23   GLUCOSE  85  94  104*   BUN  15  16  14   CREATININE  1.01  0.95  0.85   CALCIUM  9.0  8.8  9.5     Recent Labs      09/19/18   1435   APTT  29.6   INR  1.11         Recent Labs      09/20/18   0303   TRIGLYCERIDE  191*   HDL  26*   LDL  12          Assessment/Plan     * Acute ischemic stroke (HCC)- (present on admission)   Assessment & Plan    Left side weak    CT scan of the brain: ischemia with possible mild old hemorrhagic transformation.    CTA of the neck: negative.   altered mental status felt possibly seizure related(per neurology); improved   Not TPA candidate due to recent stroke  low-dose aspirin/statin/permissive hypertension   PT/OT/speech involved     Fall precautions.    MRI of the brain: pt could not remain still despite  ativan; son lizet trazadone(discontinued)   Echocardiogram: obtained this am, pending   EEG: rt cortical dysfxn noted(poss sz risk); joyce  Will ask PMR to reeval(family proposes PCP appt in [place], [place] on Mon. Am w pursuit of outpt PT/OT/ST thereafter)        Altered mental status- (present on admission)   Assessment & Plan      Improved/stable        Seizure (HCC)- (present on admission)   Assessment & Plan    Proposed to be secondary to old stroke focus.   EEG noted as above  Cont keppra  No apparent sz activity          Quality-Core Measures   DVT prophylaxis - mechanical:  SCDs

## 2018-09-22 NOTE — DISCHARGE PLANNING
Received Choice form at 1796  Agency/Facility Name: Located within [facility]  However, No Order  LSDENG Gar notified

## 2018-09-22 NOTE — PROGRESS NOTES
Patient is extremely impulsive and noncompliant with safety education. Bed alarm and chair alarm are both in place. He is also inappropriate with female staff. Will accompany CNA when she has to interact with him. Brother is at the bedside.

## 2018-09-22 NOTE — CARE PLAN
Problem: Communication  Goal: The ability to communicate needs accurately and effectively will improve    Intervention: [place] patient and significant other/support system to call light to alert staff of needs  Pt instructed on use of call light. Call light within reach.       Problem: Pain Management  Goal: Pain level will decrease to patient's comfort goal    Intervention: Follow pain managment plan developed in collaboration with patient and Interdisciplinary Team  Pt given tylenol as ordered. Pt also provided with heat packs, ice packs, and repositioned.

## 2018-09-23 VITALS
RESPIRATION RATE: 20 BRPM | SYSTOLIC BLOOD PRESSURE: 120 MMHG | HEART RATE: 96 BPM | TEMPERATURE: 98 F | DIASTOLIC BLOOD PRESSURE: 81 MMHG | WEIGHT: 260 LBS | OXYGEN SATURATION: 92 % | HEIGHT: 70 IN | BODY MASS INDEX: 37.22 KG/M2

## 2018-09-23 PROBLEM — R41.82 ALTERED MENTAL STATUS: Status: RESOLVED | Noted: 2018-09-19 | Resolved: 2018-09-23

## 2018-09-23 PROBLEM — R56.9 SEIZURE (HCC): Status: RESOLVED | Noted: 2018-09-19 | Resolved: 2018-09-23

## 2018-09-23 LAB — LV EJECT FRACT  99904: 55

## 2018-09-23 PROCEDURE — A9270 NON-COVERED ITEM OR SERVICE: HCPCS | Performed by: HOSPITALIST

## 2018-09-23 PROCEDURE — 700102 HCHG RX REV CODE 250 W/ 637 OVERRIDE(OP): Performed by: SPECIALIST

## 2018-09-23 PROCEDURE — A9270 NON-COVERED ITEM OR SERVICE: HCPCS | Performed by: FAMILY MEDICINE

## 2018-09-23 PROCEDURE — A9270 NON-COVERED ITEM OR SERVICE: HCPCS | Performed by: SPECIALIST

## 2018-09-23 PROCEDURE — 99238 HOSP IP/OBS DSCHRG MGMT 30/<: CPT | Performed by: SPECIALIST

## 2018-09-23 PROCEDURE — 700102 HCHG RX REV CODE 250 W/ 637 OVERRIDE(OP): Performed by: FAMILY MEDICINE

## 2018-09-23 PROCEDURE — 700102 HCHG RX REV CODE 250 W/ 637 OVERRIDE(OP): Performed by: HOSPITALIST

## 2018-09-23 RX ORDER — LEVETIRACETAM 750 MG/1
750 TABLET ORAL 2 TIMES DAILY
Qty: 60 TAB | Refills: 0 | Status: SHIPPED | OUTPATIENT
Start: 2018-09-23

## 2018-09-23 RX ADMIN — POLYETHYLENE GLYCOL 3350 1 PACKET: 17 POWDER, FOR SOLUTION ORAL at 06:26

## 2018-09-23 RX ADMIN — ASPIRIN 81 MG: 81 TABLET, CHEWABLE ORAL at 06:26

## 2018-09-23 RX ADMIN — ALPRAZOLAM 0.5 MG: 0.5 TABLET ORAL at 00:41

## 2018-09-23 RX ADMIN — OXYCODONE HYDROCHLORIDE 5 MG: 5 TABLET ORAL at 02:26

## 2018-09-23 RX ADMIN — LEVETIRACETAM 750 MG: 250 TABLET ORAL at 06:26

## 2018-09-23 RX ADMIN — ANTACID TABLETS 1000 MG: 500 TABLET, CHEWABLE ORAL at 02:26

## 2018-09-23 RX ADMIN — STANDARDIZED SENNA CONCENTRATE AND DOCUSATE SODIUM 2 TABLET: 8.6; 5 TABLET ORAL at 06:26

## 2018-09-23 NOTE — DISCHARGE INSTRUCTIONS
Discharge Instructions    Discharged to home by car with relative. Discharged via wheelchair, hospital escort: Yes.  Special equipment needed: Walker    Be sure to schedule a follow-up appointment with your primary care doctor or any specialists as instructed.     Discharge Plan:   Influenza Vaccine Indication: Patient Refuses    I understand that a diet low in cholesterol, fat, and sodium is recommended for good health. Unless I have been given specific instructions below for another diet, I accept this instruction as my diet prescription.   Other diet: Heart   Healthy       Special Instructions:     · Is patient discharged on Warfarin / Coumadin?   No     Depression / Suicide Risk    As you are discharged from this [facility] facility, it is important to learn how to keep safe from harming yourself.    Recognize the warning signs:  · Abrupt changes in personality, positive or negative- including increase in energy   · Giving away possessions  · Change in eating patterns- significant weight changes-  positive or negative  · Change in sleeping patterns- unable to sleep or sleeping all the time   · Unwillingness or inability to communicate  · Depression  · Unusual sadness, discouragement and loneliness  · Talk of wanting to die  · Neglect of personal appearance   · Rebelliousness- reckless behavior  · Withdrawal from people/activities they love  · Confusion- inability to concentrate     If you or a loved one observes any of these behaviors or has concerns about self-harm, here's what you can do:  · Talk about it- your feelings and reasons for harming yourself  · Remove any means that you might use to hurt yourself (examples: pills, rope, extension cords, firearm)  · Get professional help from the community (Mental Health, Substance Abuse, psychological counseling)  · Do not be alone:Call your Safe Contact- someone whom you trust who will be there for you.  · Call your local CRISIS HOTLINE 867-5253 or  136.780.1018  · Call your local Children's Mobile Crisis Response Team [place] (658) 045-0837 or www.[employer]  · Call the toll free National Suicide Prevention Hotlines   · National Suicide Prevention Lifeline 041-350-WLYG (1405)  · appbackr Line Network 800-SUICIDE (000-4954)      Epilepsy  Epilepsy is a condition in which a person has repeated seizures over time. A seizure is a sudden burst of abnormal electrical and chemical activity in the brain. Seizures can cause a change in attention, behavior, or the ability to remain awake and alert (altered mental status).  Epilepsy increases a person's risk of falls, accidents, and injury. It can also lead to complications, including:  · Depression.  · Poor memory.  · Sudden unexplained death in epilepsy (SUDEP). This complication is rare, and its cause is not known.  Most people with epilepsy lead normal lives.  What are the causes?  This condition may be caused by:  · A head injury.  · An injury that happens at birth.  · A high fever during childhood.  · A stroke.  · Bleeding that goes into or around the brain.  · Certain medicines and drugs.  · Having too little oxygen for a long period of time.  · Abnormal brain development.  · Certain infections, such as meningitis and encephalitis.  · Brain tumors.  · Conditions that are passed along from parent to child (are hereditary).  What are the signs or symptoms?  Symptoms of a seizure vary greatly from person to person. They include:  · Convulsions.  · Stiffening of the body.  · Involuntary movements of the arms or legs.  · Loss of consciousness.  · Breathing problems.  · Falling suddenly.  · Confusion.  · Head nodding.  · Eye blinking or fluttering.  · Lip smacking.  · Drooling.  · Rapid eye movements.  · Grunting.  · Loss of bladder control and bowel control.  · Staring.  · Unresponsiveness.  Some people have symptoms right before a seizure happens (aura) and right after a seizure happens. Symptoms of  an aura include:  · Fear or anxiety.  · Nausea.  · Feeling like the room is spinning (vertigo).  · A feeling of having seen or heard something before (pattie vu).  · Odd tastes or smells.  · Changes in vision, such as seeing flashing lights or spots.  Symptoms that follow a seizure include:  · Confusion.  · Sleepiness.  · Headache.  How is this diagnosed?  This condition is diagnosed based on:  · Your symptoms.  · Your medical history.  · A physical exam.  · A neurological exam. A neurological exam is similar to a physical exam. It involves checking your strength, reflexes, coordination, and sensations.  · Tests, such as:  ¨ An electroencephalogram (EEG). This is a painless test that creates a diagram of your brain waves.  ¨ An MRI of the brain.  ¨ A CT scan of the brain.  ¨ A lumbar puncture, also called a spinal tap.  ¨ Blood tests to check for signs of infection or abnormal blood chemistry.  How is this treated?  There is no cure for this condition, but treatment can help control seizures. Treatment may involve:  · Taking medicines to control seizures. These include medicines to prevent seizures and medicines to stop seizures as they occur.  · Having a device called a vagus nerve stimulator implanted in the chest. The device sends electrical impulses to the vagus nerve and to the brain to prevent seizures. This treatment may be recommended if medicines do not help.  · Brain surgery. There are several kinds of surgeries that may be done to stop seizures from happening or to reduce how often seizures happen.  · Having regular blood tests. You may need to have blood tests regularly to check that you are getting the right amount of medicine.  Once this condition has been diagnosed, it is important to begin treatment as soon as possible. For some people, epilepsy eventually goes away.  Follow these instructions at home:  Medicines  · Take over-the-counter and prescription medicines only as told by your health care  provider.  · Avoid any substances that may prevent your medicine from working properly, such as alcohol.  Activity  · Get enough rest. Lack of sleep can make seizures more likely to occur.  · Follow instructions from your health care provider about driving, swimming, and doing any other activities that would be dangerous if you had a seizure.  Educating others   Teach friends and family what to do if you have a seizure. They should:  · Lay you on the ground to prevent a fall.  · Cushion your head and body.  · Loosen any tight clothing around your neck.  · Turn you on your side. If vomiting occurs, this helps keep your airway clear.  · Stay with you until you recover.  · Not hold you down. Holding you down will not stop the seizure.  · Not put anything in your mouth.  · Know whether or not you need emergency care.  General instructions  · Avoid anything that has ever triggered a seizure for you.  · Keep a seizure diary. Record what you remember about each seizure, especially anything that might have triggered the seizure.  · Keep all follow-up visits as told by your health care provider. This is important.  Contact a health care provider if:  · Your seizure pattern changes.  · You have symptoms of infection or another illness. This might increase your risk of having a seizure.  Get help right away if:  · You have a seizure that does not stop after 5 minutes.  · You have several seizures in a row without a complete recovery in between seizures.  · You have a seizure that makes it harder to breathe.  · You have a seizure that is different from previous seizures.  · You have a seizure that leaves you unable to speak or use a part of your body.  · You did not wake up immediately after a seizure.  This information is not intended to replace advice given to you by your health care provider. Make sure you discuss any questions you have with your health care provider.  Document Released: 12/18/2006 Document Revised:  07/15/2017 Document Reviewed: 06/27/2017  Elsevier Interactive Patient Education © 2017 [employer]

## 2018-09-23 NOTE — PROGRESS NOTES
At approximately 0725 Bed alarm was heard by staff going off from pts room. I entered the room as Mirna BLANDON was assisting the pt to his knees. I assisted Mirna to stand the patient and assist back to chair.     Pt sustained an abrasion to his left lateral flank from the fall, states his side rubbed the wheelchair on the way to the floor. Site cleansed and dressed, picture to be placed in chart.

## 2018-09-23 NOTE — DISCHARGE SUMMARY
Discharge Summary    CHIEF COMPLAINT ON ADMISSION  Chief Complaint   Patient presents with   • Possible Stroke       Reason for Admission  Stroke     Admission Date  9/19/2018    CODE STATUS  Full Code    HPI & HOSPITAL COURSE  This is a 53 y.o. male admitted with altered mental status, dysarthria, swallowing dysfunction, and left-sided weakness indicative of a stroke; he had been sent to the emergency room from the acute inpatient rehab center for evaluation of the symptoms and diagnostic evaluation was positive for stroke.  Patient has been receiving aspirin and statin, PT/OT/ST, modified dysphagia diet and as needed medications for blood pressure control.  He has been noted to be impulsive and was noted to fall to his knees (without apparent injury).  Echo bubble study shows akinetic apex, otherwise preserved left ventricular function and no right to left shunt.  It is been advised to the patient and his son and brother that acute inpatient rehabilitation stay be pursued either here or in [facility].  Patient's son is unwilling to pursue that option and plans instead to drive his father back to [place] and arrange an appointment with her primary care provider on Monday morning and pursue rehabilitation services thereafter.  Patient's son understands that he is taking his father AGAINST MEDICAL ADVICE and has signed the necessary paperwork indicating so.       Therefore, he is discharged in guarded and stable condition to home with close outpatient follow-up.    The patient met 2-midnight criteria for an inpatient stay at the time of discharge.    Discharge Date  9/23/2018    FOLLOW UP ITEMS POST DISCHARGE  Primary care provider appointment tomorrow morning in [place], [place].    DISCHARGE DIAGNOSES  Principal Problem:    Acute ischemic stroke (HCC) POA: Yes  Resolved Problems:    Seizure (HCC) POA: Yes    Altered mental status POA: Yes      FOLLOW UP  No future appointments.  No follow-up  provider specified.    MEDICATIONS ON DISCHARGE     Medication List      START taking these medications      Instructions   levetiracetam 750 MG tablet  Commonly known as:  KEPPRA   Take 1 Tab by mouth 2 Times a Day.  Dose:  750 mg        CONTINUE taking these medications      Instructions   acetaminophen 650 MG CR tablet  Commonly known as:  TYLENOL   Take 650 mg by mouth every four hours as needed for Mild Pain.  Dose:  650 mg     aspirin 81 MG Chew chewable tablet  Commonly known as:  ASA   Take 81 mg by mouth every day.  Dose:  81 mg     atorvastatin 80 MG tablet  Commonly known as:  LIPITOR   Take 80 mg by mouth every evening.  Dose:  80 mg     docusate sodium 100 MG Caps  Commonly known as:  COLACE   Take 100 mg by mouth every day.  Dose:  100 mg     mag hydrox-al hydrox-simeth 400-400-40 MG/5ML Susp  Commonly known as:  MAALOX PLUS ES or MYLANTA DS   Take 20 mL by mouth every 2 hours as needed (Dyspepsia).  Dose:  20 mL     melatonin 3 MG Tabs   Take 3 mg by mouth every bedtime.  Dose:  3 mg     methocarbamol 500 MG Tabs  Commonly known as:  ROBAXIN   Take 500 mg by mouth 4 times a day as needed (Muscle Spasms).  Dose:  500 mg     ondansetron 4 MG Tbdp  Commonly known as:  ZOFRAN ODT   Take 4 mg by mouth 4 times a day as needed for Nausea.  Dose:  4 mg     vitamin D 1000 UNIT Tabs  Commonly known as:  cholecalciferol   Take 1,000 Units by mouth every day.  Dose:  1000 Units        STOP taking these medications    traZODone 50 MG Tabs  Commonly known as:  DESYREL            Allergies  No Known Allergies    DIET  Orders Placed This Encounter   Procedures   • Diet Order Regular     Standing Status:   Standing     Number of Occurrences:   1     Order Specific Question:   Diet:     Answer:   Regular [1]     Order Specific Question:   Texture/Fiber modifications:     Answer:   Dysphagia 3(Mechanical Soft)specify fluid consistency(question 6) [3]     Order Specific Question:   Consistency/Fluid modifications:      Answer:   Thin Liquids [3]     Order Specific Question:   Miscellaneous modifications:     Answer:   SLP - 1:1 Supervision by Nursing [21]       ACTIVITY  As tolerated and directed by rehab.  Weight bearing as tolerated    CONSULTATIONS  Neurology/physiatry    PROCEDURES  None    LABORATORY  Lab Results   Component Value Date    SODIUM 135 09/22/2018    POTASSIUM 3.9 09/22/2018    CHLORIDE 103 09/22/2018    CO2 23 09/22/2018    GLUCOSE 104 (H) 09/22/2018    BUN 14 09/22/2018    CREATININE 0.85 09/22/2018        Lab Results   Component Value Date    WBC 6.9 09/22/2018    HEMOGLOBIN 14.2 09/22/2018    HEMATOCRIT 43.4 09/22/2018    PLATELETCT 366 09/22/2018        Total time of the discharge process exceeds 25 minutes.

## 2018-09-23 NOTE — CARE PLAN
Problem: Communication  Goal: The ability to communicate needs accurately and effectively will improve  Outcome: PROGRESSING AS EXPECTED      Problem: Safety  Goal: Will remain free from injury  Outcome: PROGRESSING AS EXPECTED    Goal: Will remain free from falls  Outcome: PROGRESSING AS EXPECTED  Hourly rounding in place      Problem: Psychosocial Needs:  Goal: Level of anxiety will decrease  Outcome: PROGRESSING AS EXPECTED  Will decrease level of anxiety  Tries to identify cause of anxiety

## 2018-09-23 NOTE — FACE TO FACE
Face to Face Note  -  Durable Medical Equipment    Tommie Kidd M.D. - NPI: [id number]  I certify that this patient is under my care and that they had a durable medical equipment(DME)face to face encounter by myself that meets the physician DME face-to-face encounter requirements with this patient on:    Date of encounter:   Patient:                    MRN:                       YOB: 2016  Filiberto Klein  [id number]  1965     The encounter with the patient was in whole, or in part, for the following medical condition, which is the primary reason for durable medical equipment:  CVA    I certify that, based on my findings, the following durable medical equipment is medically necessary:  Wheel Chair.    HOME O2 Saturation Measurements:(Values must be present for Home Oxygen orders)         ,     ,         My Clinical findings support the need for the above equipment due to:  Abnormal Gait    Supporting Symptoms: CVA with left-sided weakness and recent history of falls

## 2018-09-23 NOTE — PROGRESS NOTES
Patient's son and POA politely informed me that on 9/23/18 by 1100 he is taking his father out of the hospital AMA. Walker has been ordered and delivered. Education by this RN was provided in an attempt to dissuade them from leaving. Ignacio states that his father has appointments already scheduled back home and that they will follow up with potential rehab, CVA, and cardiac needs with their PCP.

## 2018-09-23 NOTE — PROGRESS NOTES
Received pt from [id number]. Pt is A/Ox4, Agitated, Impulsive, anxious and non-compliant. Pt is fall risk. Reminded pt to use call light for assistance. Pacing around back and forth, bed to chair chair to bed to out hallway. had to call security for pt unpleasant behavior. RN had to stay in pt room for safety and assist pt with all his needs. Complaints of heartburn and headache, medicated per MAR. Bed alarm ON

## 2018-09-23 NOTE — PROGRESS NOTES
NSG discharge note: Family and patient decided to leave the hospital AMA. MD made aware. Scripts sent with patient. AVS printed, pt reports has a follow up scheduled w/ neurologist tomorrow.     Pt left unit transported by Mirna BLANDON in a wheelchair at approximately 9am.     Pt refused wound photo + vaccinations prior to d/c.
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
## 2018-09-27 NOTE — DOCUMENTATION QUERY
"DOCUMENTATION QUERY    PROVIDERS: Please select “Cosign w/ note”to reply to query.    To better represent the severity of illness of your patient, please review the following information and exercise your independent professional judgment in responding to this query.     Patient presented with multiple neurological deficits and was admitted for acute stroke. Neurology progress note dated 09/21 documents, \"suspect sz with post ictal deficit 2' to old stroke with mild old hem transformation, EEG ABN SUGGESTS RIGHT HEMISPHERIC SEIZURE RISK W/HX CONSISTENT WITH SAME. TRIGGERED BY TRAZADONE LIKELY.   R MCA ISCHEMIC STROKE WITH HEM TRANS, APPROX 2WKS OLD, POSSIBLY CARDIOEMBOLIC.\" Progress Notes also document seizure possibly secondary to previous stroke. Discharge Summary documents acute ischemic stroke.    Based on clinical findings, risk factors and treatment, can this patient's neurologic deficits be further clarified as    1. Sequelae of previous stroke with hemorrhagic conversion  2. Seizure secondary to medications  3. Seizure secondary to previous stroke  4. Other explanation of patient's clinical presentation (please document)  5. Unable to determine    The medical record reflects the following:   Clinical Findings  previous stroke with hemorrhagic transformation   acute ischemic stroke?   seizure 2/2 previous stroke/medications/other?   Treatment  Keppra, aspirin, statins   Risk Factors     Location within medical record  History and Physical, Progress Notes, Discharge Summary and Consult      Thank you,   Dede Hernandez          "

## 2018-10-24 NOTE — DOCUMENTATION QUERY
DOCUMENTATION QUERY    PROVIDERS: Please select “Cosign w/ note”to reply to query.    To better represent the severity of illness of your patient, please review the following information and exercise your independent professional judgment in responding to this query.     Thank you for your query response. Sepsis was not documented throughout the chart prior to your query response.     Based on clinical findings, risk factors and treatment, can a diagnosis be provided for the infection causing the sepsis?      The medical record reflects the following:   Clinical Findings  encephalopathy 2/2 sepsis (POA)   Treatment     Risk Factors     Location within medical record  History and Physical, Progress Notes, Discharge Summary and Query response     Thank you,   Dede Hernandez

## 2018-10-24 NOTE — ADDENDUM NOTE
Encounter addended by: Dede Hernandez on: 10/24/2018  8:06 AM<BR>    Actions taken: Pend clinical note

## 2018-10-24 NOTE — ADDENDUM NOTE
Encounter addended by: Dede Hernandez on: 10/24/2018  8:59 AM<BR>    Actions taken: Sign clinical note

## 2018-10-25 NOTE — ADDENDUM NOTE
Encounter addended by: Tommie Kidd M.D. on: 10/24/2018  7:45 PM<BR>    Actions taken: Edit attestation on clinical note, Cosign clinical note with attestation

## 2018-11-13 ENCOUNTER — NON-PROVIDER VISIT (OUTPATIENT)
Dept: CARDIOLOGY | Facility: MEDICAL CENTER | Age: 53
End: 2018-11-13
Payer: COMMERCIAL

## 2018-11-13 DIAGNOSIS — Z95.818 STATUS POST PLACEMENT OF IMPLANTABLE LOOP RECORDER: ICD-10-CM

## 2018-11-13 PROCEDURE — 93298 REM INTERROG DEV EVAL SCRMS: CPT | Performed by: INTERNAL MEDICINE

## 2018-11-14 NOTE — NON-PROVIDER
30 Day Billing Report   Type of monitoring: Remote/ [employer]     : [employer]     Date of Transmission: 11/9/2018     Type of device: Loop Recorder     Episodes: 0.0 % lifetime  Last episode recorded: no episodes

## 2018-12-13 ENCOUNTER — NON-PROVIDER VISIT (OUTPATIENT)
Dept: CARDIOLOGY | Facility: MEDICAL CENTER | Age: 53
End: 2018-12-13

## 2018-12-13 DIAGNOSIS — Z95.818 STATUS POST PLACEMENT OF IMPLANTABLE LOOP RECORDER: ICD-10-CM

## 2018-12-13 PROCEDURE — 93298 REM INTERROG DEV EVAL SCRMS: CPT | Performed by: INTERNAL MEDICINE

## 2019-01-11 ENCOUNTER — NON-PROVIDER VISIT (OUTPATIENT)
Dept: CARDIOLOGY | Facility: MEDICAL CENTER | Age: 54
End: 2019-01-11

## 2019-01-11 DIAGNOSIS — Z95.818 STATUS POST PLACEMENT OF IMPLANTABLE LOOP RECORDER: ICD-10-CM

## 2019-01-11 PROCEDURE — 93298 REM INTERROG DEV EVAL SCRMS: CPT | Performed by: INTERNAL MEDICINE

## 2019-04-10 ENCOUNTER — NON-PROVIDER VISIT (OUTPATIENT)
Dept: CARDIOLOGY | Facility: MEDICAL CENTER | Age: 54
End: 2019-04-10

## 2019-04-10 DIAGNOSIS — Z95.818 STATUS POST PLACEMENT OF IMPLANTABLE LOOP RECORDER: ICD-10-CM

## 2019-04-10 PROCEDURE — 93298 REM INTERROG DEV EVAL SCRMS: CPT | Performed by: INTERNAL MEDICINE

## 2019-06-12 ENCOUNTER — NON-PROVIDER VISIT (OUTPATIENT)
Dept: CARDIOLOGY | Facility: MEDICAL CENTER | Age: 54
End: 2019-06-12

## 2019-06-12 DIAGNOSIS — Z95.818 STATUS POST PLACEMENT OF IMPLANTABLE LOOP RECORDER: ICD-10-CM

## 2019-06-12 PROCEDURE — 93298 REM INTERROG DEV EVAL SCRMS: CPT | Performed by: INTERNAL MEDICINE

## 2019-08-27 ENCOUNTER — TELEPHONE (OUTPATIENT)
Dept: CARDIOLOGY | Facility: MEDICAL CENTER | Age: 54
End: 2019-08-27

## 2019-08-27 NOTE — TELEPHONE ENCOUNTER
Patient's loop recorder transmitted via home monitor 8/26--episode 8/14 device calling AF lasting 4 minutes--does not appear to be AF, however please review.  Patient does live in [place] per notes is not followed with Cardiology. Report scanned for review.

## 2019-09-24 ENCOUNTER — TELEPHONE (OUTPATIENT)
Dept: CARDIOLOGY | Facility: MEDICAL CENTER | Age: 54
End: 2019-09-24

## 2019-09-24 NOTE — TELEPHONE ENCOUNTER
Patient returned phone call regarding monitor being disconnected. Patient was able to send a manual transmission to reconnect the device.

## 2019-10-15 ENCOUNTER — NON-PROVIDER VISIT (OUTPATIENT)
Dept: CARDIOLOGY | Facility: MEDICAL CENTER | Age: 54
End: 2019-10-15

## 2019-10-15 DIAGNOSIS — Z95.818 STATUS POST PLACEMENT OF IMPLANTABLE LOOP RECORDER: ICD-10-CM

## 2019-10-15 DIAGNOSIS — I63.9 CRYPTOGENIC STROKE (HCC): ICD-10-CM

## 2019-10-15 PROCEDURE — 93298 REM INTERROG DEV EVAL SCRMS: CPT | Performed by: INTERNAL MEDICINE

## 2019-11-04 ENCOUNTER — TELEPHONE (OUTPATIENT)
Dept: CARDIOLOGY | Facility: MEDICAL CENTER | Age: 54
End: 2019-11-04

## 2019-11-04 NOTE — TELEPHONE ENCOUNTER
"Informed patient of his monitor being disconnected, patient stated that \"it has been unplugged for awhile and hasn't found a place to plug it into\" Advised patient that if he leaves it plugged in that the downloads will happen on their own. Patient will plug in monitor and do a manual transmission.   "

## 2020-01-08 ENCOUNTER — TELEPHONE (OUTPATIENT)
Dept: CARDIOLOGY | Facility: MEDICAL CENTER | Age: 55
End: 2020-01-08

## 2020-01-08 NOTE — TELEPHONE ENCOUNTER
"Called patient about the need of a manual transmission. Patient apologizes, device doesn't have a \"plug to stay plugged in\" and will do a manual transmission \"in a few minutes\". Pt appreciative of the call.     "

## 2020-07-21 NOTE — CARE PLAN
Problem: Nutritional:  Goal: Achieve adequate nutritional intake  Patient will consume >50% of meals   Outcome: MET Date Met: 09/12/18         3
